# Patient Record
Sex: FEMALE | Race: AMERICAN INDIAN OR ALASKA NATIVE | NOT HISPANIC OR LATINO | Employment: FULL TIME | URBAN - METROPOLITAN AREA
[De-identification: names, ages, dates, MRNs, and addresses within clinical notes are randomized per-mention and may not be internally consistent; named-entity substitution may affect disease eponyms.]

---

## 2020-02-04 ENCOUNTER — TELEPHONE (OUTPATIENT)
Dept: VASCULAR SURGERY | Facility: CLINIC | Age: 50
End: 2020-02-04

## 2020-02-04 NOTE — TELEPHONE ENCOUNTER
----- Message from Beth Hurt sent at 2/4/2020 11:39 AM CST -----  Contact: pt   Pt would like to see about speaking with someone in staff about being scheduled to see doctor.  Pt contact is [phone]

## 2020-02-07 ENCOUNTER — INITIAL CONSULT (OUTPATIENT)
Dept: CARDIOLOGY | Facility: CLINIC | Age: 50
End: 2020-02-07
Payer: COMMERCIAL

## 2020-02-07 VITALS
HEIGHT: 66 IN | BODY MASS INDEX: 27.35 KG/M2 | SYSTOLIC BLOOD PRESSURE: 130 MMHG | DIASTOLIC BLOOD PRESSURE: 67 MMHG | WEIGHT: 170.19 LBS | HEART RATE: 85 BPM

## 2020-02-07 DIAGNOSIS — F17.200 SMOKING: ICD-10-CM

## 2020-02-07 DIAGNOSIS — I83.813 VARICOSE VEINS OF BOTH LOWER EXTREMITIES WITH PAIN: Primary | ICD-10-CM

## 2020-02-07 PROCEDURE — 3008F PR BODY MASS INDEX (BMI) DOCUMENTED: ICD-10-PCS | Mod: CPTII,S$GLB,, | Performed by: INTERNAL MEDICINE

## 2020-02-07 PROCEDURE — 99999 PR PBB SHADOW E&M-EST. PATIENT-LVL III: CPT | Mod: PBBFAC,,, | Performed by: INTERNAL MEDICINE

## 2020-02-07 PROCEDURE — 3008F BODY MASS INDEX DOCD: CPT | Mod: CPTII,S$GLB,, | Performed by: INTERNAL MEDICINE

## 2020-02-07 PROCEDURE — 99999 PR PBB SHADOW E&M-EST. PATIENT-LVL III: ICD-10-PCS | Mod: PBBFAC,,, | Performed by: INTERNAL MEDICINE

## 2020-02-07 PROCEDURE — 99205 OFFICE O/P NEW HI 60 MIN: CPT | Mod: S$GLB,,, | Performed by: INTERNAL MEDICINE

## 2020-02-07 PROCEDURE — 99205 PR OFFICE/OUTPT VISIT, NEW, LEVL V, 60-74 MIN: ICD-10-PCS | Mod: S$GLB,,, | Performed by: INTERNAL MEDICINE

## 2020-02-07 RX ORDER — MIRABEGRON 50 MG/1
TABLET, FILM COATED, EXTENDED RELEASE ORAL
COMMUNITY
Start: 2020-01-23 | End: 2022-01-19

## 2020-02-07 RX ORDER — DOXYCYCLINE HYCLATE 50 MG/1
CAPSULE ORAL
COMMUNITY
Start: 2020-01-23 | End: 2024-02-21

## 2020-02-07 RX ORDER — TRIAMCINOLONE ACETONIDE 5 MG/G
CREAM TOPICAL
COMMUNITY
Start: 2019-11-29 | End: 2024-02-08

## 2020-02-07 RX ORDER — CHOLECALCIFEROL (VITAMIN D3) 25 MCG
1000 TABLET ORAL DAILY
COMMUNITY
End: 2022-01-19

## 2020-02-07 NOTE — PROGRESS NOTES
"[facility]      Chief Complaint   Patient presents with    Varicose Veins       Patient ID: Isaias Collado is a 49 y.o. female with a past medical history of varicose veins s/p EVLT, lymphomatoid papulosis, who presents for an initial appointment.  Pertinent history events are as follows:     -Pt presents for evaluation of varicose veins.     HPI:  Mrs. Collado reports first noticing varicose veins approximately 5 years ago.  Underwent EVLT to the right leg in 2016.  Now complains of pain in right leg at areas of the varicose veins.  She has no edema and no tissue loss.  States she has "smoked socially for 5 years total."  Exam shows large varicose veins at RLE.  LLE with small varicose veins.    History reviewed. No pertinent past medical history.  History reviewed. No pertinent surgical history.  Social History     Socioeconomic History    Marital status:      Spouse name: Not on file    Number of children: Not on file    Years of education: Not on file    Highest education level: Not on file   Occupational History    Not on file   Social Needs    Financial resource strain: Not on file    Food insecurity:     Worry: Not on file     Inability: Not on file    Transportation needs:     Medical: Not on file     Non-medical: Not on file   Tobacco Use    Smoking status: Current Some Day Smoker    Smokeless tobacco: Never Used   Substance and Sexual Activity    Alcohol use: Yes     Comment: socially     Drug use: Not on file    Sexual activity: Not on file   Lifestyle    Physical activity:     Days per week: Not on file     Minutes per session: Not on file    Stress: Not on file   Relationships    Social connections:     Talks on phone: Not on file     Gets together: Not on file     Attends Islam service: Not on file     Active member of club or organization: Not on file     Attends meetings of clubs or organizations: Not on file     Relationship status: Not on file   Other Topics " "Concern    Not on file   Social History Narrative    Not on file     Family History   Problem Relation Age of Onset    Heart failure Father     Heart disease Father        Review of patient's allergies indicates:   Allergen Reactions    Morphine Itching       Medication List with Changes/Refills   Current Medications    DOXYCYCLINE (VIBRAMYCIN) 50 MG CAPSULE        LACTOBAC NO.41/BIFIDOBACT NO.7 (PROBIOTIC-10 ORAL)    Take by mouth.    MYRBETRIQ 50 MG TB24        TRIAMCINOLONE ACETONIDE 0.5% (KENALOG) 0.5 % CREA        VITAMIN D (VITAMIN D3) 1000 UNITS TAB    Take 1,000 Units by mouth once daily.       Review of Systems  Constitution: Denies chills, fever, and sweats.  HENT: Denies headaches or blurry vision.  Cardiovascular: Denies chest pain or irregular heart beat.  Respiratory: Denies cough or shortness of breath.  Gastrointestinal: Denies abdominal pain, nausea, or vomiting.  Musculoskeletal: Positive for right leg pain.    Neurological: Denies dizziness or focal weakness.  Psychiatric/Behavioral: Normal mental status.  Hematologic/Lymphatic: Denies bleeding problem or easy bruising/bleeding.  Skin: Denies rash or suspicious lesions    Physical Examination  /67 (BP Location: Left arm, Patient Position: Sitting, BP Method: Medium (Automatic))   Pulse 85   Ht 5' 6" (1.676 m)   Wt 77.2 kg (170 lb 3.1 oz)   BMI 27.47 kg/m²     Constitutional: No acute distress, conversant  HEENT: Sclera anicteric, Pupils equal, round and reactive to light, extraocular motions intact, Oropharynx clear  Neck: No JVD, no carotid bruits  Cardiovascular: regular rate and rhythm, no murmur, rubs or gallops, normal S1/S2  Pulmonary: Clear to auscultation bilaterally  Abdominal: Abdomen soft, nontender, nondistended, positive bowel sounds  Extremities: No lower extremity edema, large varicose veins at RLE.  LLE with small varicose veins.    Pulses:  Carotid pulses are 2+ on the right side, and 2+ on the left side.  Radial " pulses are 2+ on the right side, and 2+ on the left side.   Femoral pulses are 2+ on the right side, and 2+ on the left side.  Popliteal pulses are 2+ on the right side, and 2+ on the left side.   Dorsalis pedis pulses are 2+ on the right side, and 2+ on the left side.   Posterior tibial pulses are 2+ on the right side, and 2+ on the left side.    Skin: No ecchymosis, erythema, or ulcers  Psych: Alert and oriented x 3, appropriate affect  Neuro: CNII-XII intact, no focal deficits    Labs:  Most Recent Data  CBC: No results found for: WBC, HGB, HCT, PLT, MCV, RDW  BMP: No results found for: NA, K, CL, CO2, BUN, CREATININE, GLU, CALCIUM, MG, PHOS  LFTS; No results found for: PROT, ALBUMIN, BILITOT, AST, ALKPHOS, ALT, GGT  COAGS: No results found for: INR, PROTIME, PTT  FLP: No results found for: CHOL, HDL, LDLCALC, TRIG, CHOLHDL  CARDIAC: No results found for: TROPONINI, CKMB, BNP      Assessment/Plan:  Isaias Collado is a 49 y.o. female with a past medical history of varicose veins s/p EVLT, lymphomatoid papulosis, who presents for an initial appointment.     1. BLE Varicose Veins with Pain- Pt with history of previous EVLT of right leg.  Check BLE venous reflux study and STORM study.  If no moderate to severe PAD, will start graduated compression hose.  Pt to elevate legs when resting.     2. Smoking- Continue to encourage smoking cessation.  Pt does not want to be referred to smoking cessation at this time.      Follow up in 3 weeks    Total duration of face to face visit time 30 minutes.  Total time spent counseling greater than fifty percent of total visit time.  Counseling included discussion regarding imaging findings, diagnosis, possibilities, treatment options, risks and benefits.  The patient had many questions regarding the options and long-term effects.    Jose Coates MD, PhD  Interventional Cardiology

## 2020-02-07 NOTE — PATIENT INSTRUCTIONS
Assessment/Plan:  Isaias Collado is a 49 y.o. female with a past medical history of varicose veins s/p EVLT, lymphomatoid papulosis, who presents for an initial appointment.     1. BLE Varicose Veins with Pain- Pt with history of previous EVLT of right leg.  Check BLE venous reflux study and STORM study.  If no moderate to severe PAD, will start graduated compression hose.  Pt to elevate legs when resting.     2. Smoking- Continue to encourage smoking cessation.  Pt does not want to be referred to smoking cessation at this time.      Follow up in 3 weeks

## 2020-03-02 ENCOUNTER — CLINICAL SUPPORT (OUTPATIENT)
Dept: CARDIOLOGY | Facility: CLINIC | Age: 50
End: 2020-03-02
Attending: INTERNAL MEDICINE
Payer: COMMERCIAL

## 2020-03-02 DIAGNOSIS — I83.813 VARICOSE VEINS OF BOTH LOWER EXTREMITIES WITH PAIN: ICD-10-CM

## 2020-03-02 DIAGNOSIS — F17.200 SMOKING: ICD-10-CM

## 2020-03-02 LAB
LEFT ABI: 1.17
LEFT ARM BP: 146 MMHG
LEFT DORSALIS PEDIS: 171 MMHG
LEFT POSTERIOR TIBIAL: 165 MMHG
RIGHT ABI: 1.14
RIGHT ARM BP: 145 MMHG
RIGHT DORSALIS PEDIS: 158 MMHG
RIGHT POSTERIOR TIBIAL: 167 MMHG

## 2020-03-02 PROCEDURE — 93922 CV US ANKLE BRACHIAL INDICES EXT LTD WO STRESS (CUPID ONLY): ICD-10-PCS | Mod: S$GLB,,, | Performed by: INTERNAL MEDICINE

## 2020-03-02 PROCEDURE — 93922 UPR/L XTREMITY ART 2 LEVELS: CPT | Mod: S$GLB,,, | Performed by: INTERNAL MEDICINE

## 2020-03-04 ENCOUNTER — CLINICAL SUPPORT (OUTPATIENT)
Dept: CARDIOLOGY | Facility: CLINIC | Age: 50
End: 2020-03-04
Attending: INTERNAL MEDICINE
Payer: COMMERCIAL

## 2020-03-04 DIAGNOSIS — I83.813 VARICOSE VEINS OF BOTH LOWER EXTREMITIES WITH PAIN: ICD-10-CM

## 2020-03-04 DIAGNOSIS — F17.200 SMOKING: ICD-10-CM

## 2020-03-04 LAB
LEFT GIAC DIA: 0.25 MM
LEFT GREAT SAPHENOUS DISTAL THIGH DIA: 0.24 CM
LEFT GREAT SAPHENOUS JUNCTION DIA: 0.48 CM
LEFT GREAT SAPHENOUS JUNCTION REFLUX: 1832 MS
LEFT GREAT SAPHENOUS KNEE DIA: 0.26 CM
LEFT GREAT SAPHENOUS MIDDLE THIGH DIA: 0.32 CM
LEFT GREAT SAPHENOUS PROXIMAL CALF DIA: 0.23 CM
RIGHT GIAC DIA: 0.18 MM
RIGHT GREAT SAPHENOUS JUNCTION DIA: 2272 CM
RIGHT GREAT SAPHENOUS JUNCTION REFLUX: 1 MS
RIGHT GREAT SAPHENOUS KNEE DIA: 0.18 CM
RIGHT GREAT SAPHENOUS PROXIMAL CALF DIA: 0.2 CM

## 2020-03-04 PROCEDURE — 93970 EXTREMITY STUDY: CPT | Mod: S$GLB,,, | Performed by: INTERNAL MEDICINE

## 2020-03-04 PROCEDURE — 93970 CV US LOWER VENOUS INSUFFICIENCY BILATERAL (CUPID ONLY): ICD-10-PCS | Mod: S$GLB,,, | Performed by: INTERNAL MEDICINE

## 2020-03-12 ENCOUNTER — OFFICE VISIT (OUTPATIENT)
Dept: CARDIOLOGY | Facility: CLINIC | Age: 50
End: 2020-03-12
Payer: COMMERCIAL

## 2020-03-12 VITALS
HEART RATE: 80 BPM | DIASTOLIC BLOOD PRESSURE: 86 MMHG | HEIGHT: 66 IN | SYSTOLIC BLOOD PRESSURE: 128 MMHG | WEIGHT: 169.75 LBS | BODY MASS INDEX: 27.28 KG/M2

## 2020-03-12 DIAGNOSIS — F17.200 SMOKING: ICD-10-CM

## 2020-03-12 DIAGNOSIS — I87.2 VENOUS INSUFFICIENCY OF BOTH LOWER EXTREMITIES: ICD-10-CM

## 2020-03-12 DIAGNOSIS — I83.813 VARICOSE VEINS OF BOTH LOWER EXTREMITIES WITH PAIN: Primary | ICD-10-CM

## 2020-03-12 PROCEDURE — 99999 PR PBB SHADOW E&M-EST. PATIENT-LVL IV: ICD-10-PCS | Mod: PBBFAC,,, | Performed by: INTERNAL MEDICINE

## 2020-03-12 PROCEDURE — 99215 OFFICE O/P EST HI 40 MIN: CPT | Mod: S$GLB,,, | Performed by: INTERNAL MEDICINE

## 2020-03-12 PROCEDURE — 3008F BODY MASS INDEX DOCD: CPT | Mod: CPTII,S$GLB,, | Performed by: INTERNAL MEDICINE

## 2020-03-12 PROCEDURE — 3079F PR MOST RECENT DIASTOLIC BLOOD PRESSURE 80-89 MM HG: ICD-10-PCS | Mod: CPTII,S$GLB,, | Performed by: INTERNAL MEDICINE

## 2020-03-12 PROCEDURE — 3079F DIAST BP 80-89 MM HG: CPT | Mod: CPTII,S$GLB,, | Performed by: INTERNAL MEDICINE

## 2020-03-12 PROCEDURE — 3074F SYST BP LT 130 MM HG: CPT | Mod: CPTII,S$GLB,, | Performed by: INTERNAL MEDICINE

## 2020-03-12 PROCEDURE — 99999 PR PBB SHADOW E&M-EST. PATIENT-LVL IV: CPT | Mod: PBBFAC,,, | Performed by: INTERNAL MEDICINE

## 2020-03-12 PROCEDURE — 99215 PR OFFICE/OUTPT VISIT, EST, LEVL V, 40-54 MIN: ICD-10-PCS | Mod: S$GLB,,, | Performed by: INTERNAL MEDICINE

## 2020-03-12 PROCEDURE — 3074F PR MOST RECENT SYSTOLIC BLOOD PRESSURE < 130 MM HG: ICD-10-PCS | Mod: CPTII,S$GLB,, | Performed by: INTERNAL MEDICINE

## 2020-03-12 PROCEDURE — 3008F PR BODY MASS INDEX (BMI) DOCUMENTED: ICD-10-PCS | Mod: CPTII,S$GLB,, | Performed by: INTERNAL MEDICINE

## 2020-03-12 RX ORDER — PAROXETINE 10 MG/1
TABLET, FILM COATED ORAL
COMMUNITY
Start: 2020-02-07 | End: 2022-01-19

## 2020-03-12 RX ORDER — DOXYCYCLINE HYCLATE 100 MG
100 TABLET ORAL DAILY
COMMUNITY
End: 2022-01-19

## 2020-03-12 NOTE — PROGRESS NOTES
"[facility]      Chief Complaint   Patient presents with    Varicose veins of both lower extremities with pain       Patient ID: Isaias Collado is a 49 y.o. female with a past medical history of varicose veins s/p EVLT, lymphomatoid papulosis, who presents for a follow up appointment.  Pertinent history events are as follows:     -Pt presents for evaluation of varicose veins.     -At our initial clinic visit on 2020, Mrs. Collado reported first noticing varicose veins approximately 5 years ago.  Underwent EVLT to the right leg in 2016.  Now complains of pain in right leg at areas of the varicose veins.  She has no edema and no tissue loss.  States she has "smoked socially for 2 years total."  Exam shows large varicose veins at RLE.  LLE with small varicose veins.  Plan:   BLE Varicose Veins with Pain- Pt with history of previous EVLT of right leg.  Check BLE venous reflux study and STORM study.  If no moderate to severe PAD, will start graduated compression hose.  Pt to elevate legs when resting.   Smoking- Continue to encourage smoking cessation.  Pt does not want to be referred to smoking cessation at this time.      HPI:  Mrs. Collado reports continued pain in right leg at areas of the varicose veins.  She has no edema and no tissue loss.  BLE venous reflux study on 3/4/2020 showed significant BLE venous reflux with no DVT.  STORM Study on 3/2/2020 showed normal STORM bilaterally.  Normal PVR waveforms bilaterally.    Past Medical History:   Diagnosis Date    Adenomyosis internal 2016    Dysfunctional uterine bleeding 2016    Gestational diabetes     Hx of renal calculi     Hypertension     no problem since weight loss     Panic attacks     PCOS (polycystic ovarian syndrome) 2016    w/ insulin resistance    Rosacea     Varicose vein of leg     laser and injection tx      Past Surgical History:   Procedure Laterality Date     SECTION      ;     SKIN BIOPSY      " lymphomatoid papulosis    TUBAL LIGATION Bilateral     VARICOSE VEIN SURGERY Right     laser and injection     Social History     Socioeconomic History    Marital status:      Spouse name: Not on file    Number of children: Not on file    Years of education: Not on file    Highest education level: Not on file   Occupational History    Not on file   Social Needs    Financial resource strain: Not on file    Food insecurity:     Worry: Not on file     Inability: Not on file    Transportation needs:     Medical: Not on file     Non-medical: Not on file   Tobacco Use    Smoking status: Current Some Day Smoker    Smokeless tobacco: Never Used   Substance and Sexual Activity    Alcohol use: Yes     Comment: socially     Drug use: No    Sexual activity: Not on file   Lifestyle    Physical activity:     Days per week: Not on file     Minutes per session: Not on file    Stress: Not on file   Relationships    Social connections:     Talks on phone: Not on file     Gets together: Not on file     Attends Sikh service: Not on file     Active member of club or organization: Not on file     Attends meetings of clubs or organizations: Not on file     Relationship status: Not on file   Other Topics Concern    Not on file   Social History Narrative    ** Merged History Encounter **          Family History   Problem Relation Age of Onset    Heart failure Father     Heart disease Father     Diabetes Mother     Coronary artery disease Father     Diabetes Father        Review of patient's allergies indicates:   Allergen Reactions    Lisinopril Other (See Comments)     Cough.    Erythromycin Other (See Comments)     Nausea and cold sweats    Morphine Itching    Adhesive Rash     Paper tape ok       Medication List with Changes/Refills   Current Medications    ALPRAZOLAM (XANAX) 0.25 MG TABLET    TAKE 1 TABLET BY MOUTH 3 TIMES DAILY AS NEEDED FOR ANXIETY.    DOXYCYCLINE (ORACEA) 40 MG CAPSULE    Take  "40 mg by mouth every evening.    DOXYCYCLINE (VIBRA-TABS) 100 MG TABLET    Take 100 mg by mouth once daily.    DOXYCYCLINE (VIBRAMYCIN) 50 MG CAPSULE        ESCITALOPRAM OXALATE (LEXAPRO) 10 MG TABLET    Take 1 tablet (10 mg total) by mouth once daily.    IBUPROFEN (ADVIL,MOTRIN) 800 MG TABLET    Take 1 tablet (800 mg total) by mouth 3 (three) times daily.    LACTOBAC NO.41/BIFIDOBACT NO.7 (PROBIOTIC-10 ORAL)    Take by mouth.    METFORMIN (GLUCOPHAGE) 500 MG TABLET    Take 500 mg by mouth 2 (two) times daily with meals.    MYRBETRIQ 50 MG TB24        OXYCODONE-ACETAMINOPHEN (PERCOCET) 5-325 MG PER TABLET    Take 2 tablets by mouth every 6 (six) hours as needed for Pain.    PAROXETINE (PAXIL) 10 MG TABLET        PROMETHAZINE (PHENERGAN) 25 MG TABLET    Take 1 tablet (25 mg total) by mouth every 6 (six) hours as needed for Nausea.    TRIAMCINOLONE ACETONIDE 0.5% (KENALOG) 0.5 % CREA        VITAMIN D (VITAMIN D3) 1000 UNITS TAB    Take 1,000 Units by mouth once daily.       Review of Systems  Constitution: Denies chills, fever, and sweats.  HENT: Denies headaches or blurry vision.  Cardiovascular: Denies chest pain or irregular heart beat.  Respiratory: Denies cough or shortness of breath.  Gastrointestinal: Denies abdominal pain, nausea, or vomiting.  Musculoskeletal: Positive for right leg pain.    Neurological: Denies dizziness or focal weakness.  Psychiatric/Behavioral: Normal mental status.  Hematologic/Lymphatic: Denies bleeding problem or easy bruising/bleeding.  Skin: Denies rash or suspicious lesions    Physical Examination  /86   Pulse 80   Ht 5' 6" (1.676 m)   Wt 77 kg (169 lb 12.1 oz)   BMI 27.40 kg/m²     Constitutional: No acute distress, conversant  HEENT: Sclera anicteric, Pupils equal, round and reactive to light, extraocular motions intact, Oropharynx clear  Neck: No JVD, no carotid bruits  Cardiovascular: regular rate and rhythm, no murmur, rubs or gallops, normal S1/S2  Pulmonary: Clear " to auscultation bilaterally  Abdominal: Abdomen soft, nontender, nondistended, positive bowel sounds  Extremities: No lower extremity edema, large varicose veins at RLE.  LLE with small varicose veins.    Pulses:  Carotid pulses are 2+ on the right side, and 2+ on the left side.  Radial pulses are 2+ on the right side, and 2+ on the left side.   Femoral pulses are 2+ on the right side, and 2+ on the left side.  Popliteal pulses are 2+ on the right side, and 2+ on the left side.   Dorsalis pedis pulses are 2+ on the right side, and 2+ on the left side.   Posterior tibial pulses are 2+ on the right side, and 2+ on the left side.    Skin: No ecchymosis, erythema, or ulcers  Psych: Alert and oriented x 3, appropriate affect  Neuro: CNII-XII intact, no focal deficits    Labs:  Most Recent Data  CBC:   Lab Results   Component Value Date    WBC 8.34 09/07/2016    HGB 8.6 (L) 09/07/2016    HCT 28.4 (L) 09/07/2016     09/07/2016    MCV 82 09/07/2016    RDW 15.6 (H) 09/07/2016     BMP:   Lab Results   Component Value Date     09/07/2016    K 4.0 09/07/2016     09/07/2016    CO2 27 09/07/2016    BUN 7 09/07/2016    CREATININE 0.56 09/07/2016    GLU 90 09/07/2016    CALCIUM 7.6 (L) 09/07/2016     LFTS;   Lab Results   Component Value Date    PROT 7.0 03/23/2016    ALBUMIN 4.4 03/23/2016    BILITOT 0.4 03/23/2016    AST 16 03/23/2016    ALKPHOS 57 03/23/2016    ALT 15 03/23/2016     COAGS: No results found for: INR, PROTIME, PTT  FLP:   Lab Results   Component Value Date    CHOL 253 (H) 03/23/2016    HDL 56 03/23/2016    LDLCALC 155 (H) 03/23/2016    TRIG 212 (H) 03/23/2016     CARDIAC: No results found for: TROPONINI, CKMB, BNP    BLE venous reflux study 3/4/2020:  · No DVT. Bilateral Common Femoral Vein reflux.  · S/P Right EVLT. Right GSV not seen at mid/distal thigh to knee level. At the prox/mid thigh level it joins a tortuous superficial accessory vein that courses anteriorly down the mid and lower thigh  to below the knee. It measures up to 6mm in diameter and demonstrates reflux = 1906ms.  · Left GSV demonstrates reflux at the SFJ but no reflux demonstrated into the proximal thigh or lower leg.    STORM Study 3/2/2020:  Normal STORM bilaterally.  Normal PVR waveforms bilaterally.    Assessment/Plan:  Isaias Collado is a 49 y.o. female with a past medical history of varicose veins s/p EVLT, lymphomatoid papulosis, who presents for a follow up appointment.     1. BLE Varicose Veins with Pain- Pt with history of previous EVLT of right leg.  Mrs. Collado reports continued pain in right leg at areas of the varicose veins.  She has no edema and no tissue loss.  BLE venous reflux study on 3/4/2020 showed significant BLE venous reflux with no DVT.  STORM Study on 3/2/2020 showed normal STORM bilaterally.  Normal PVR waveforms bilaterally.  Start graduated compression hose.  Pt to elevate legs when resting.     2. Smoking- Continue to encourage smoking cessation.  Pt does not want to be referred to smoking cessation at this time.      Follow up in 3 months    Total duration of face to face visit time 30 minutes.  Total time spent counseling greater than fifty percent of total visit time.  Counseling included discussion regarding imaging findings, diagnosis, possibilities, treatment options, risks and benefits.  The patient had many questions regarding the options and long-term effects.    Jose Coates MD, PhD  Interventional Cardiology

## 2020-03-12 NOTE — PATIENT INSTRUCTIONS
Assessment/Plan:  Isaias Collado is a 49 y.o. female with a past medical history of varicose veins s/p EVLT, lymphomatoid papulosis, who presents for a follow up appointment.     1. BLE Varicose Veins with Pain- Pt with history of previous EVLT of right leg.  Mrs. Collado reports continued pain in right leg at areas of the varicose veins.  She has no edema and no tissue loss.  BLE venous reflux study on 3/4/2020 showed significant BLE venous reflux with no DVT.  STORM Study on 3/2/2020 showed normal STORM bilaterally.  Normal PVR waveforms bilaterally.  Start graduated compression hose.  Pt to elevate legs when resting.     2. Smoking- Continue to encourage smoking cessation.  Pt does not want to be referred to smoking cessation at this time.      Follow up in 3 months

## 2021-05-10 ENCOUNTER — PATIENT MESSAGE (OUTPATIENT)
Dept: RESEARCH | Facility: HOSPITAL | Age: 51
End: 2021-05-10

## 2022-01-19 ENCOUNTER — OFFICE VISIT (OUTPATIENT)
Dept: CARDIOLOGY | Facility: CLINIC | Age: 52
End: 2022-01-19
Payer: COMMERCIAL

## 2022-01-19 VITALS
DIASTOLIC BLOOD PRESSURE: 74 MMHG | HEIGHT: 66 IN | BODY MASS INDEX: 28.03 KG/M2 | HEART RATE: 78 BPM | WEIGHT: 174.38 LBS | SYSTOLIC BLOOD PRESSURE: 138 MMHG | OXYGEN SATURATION: 100 %

## 2022-01-19 DIAGNOSIS — I83.813 VARICOSE VEINS OF BOTH LOWER EXTREMITIES WITH PAIN: ICD-10-CM

## 2022-01-19 DIAGNOSIS — I83.11 VARICOSE VEINS OF BOTH LOWER EXTREMITIES WITH INFLAMMATION: ICD-10-CM

## 2022-01-19 DIAGNOSIS — I87.2 VENOUS INSUFFICIENCY OF BOTH LOWER EXTREMITIES: Primary | ICD-10-CM

## 2022-01-19 DIAGNOSIS — I83.12 VARICOSE VEINS OF BOTH LOWER EXTREMITIES WITH INFLAMMATION: ICD-10-CM

## 2022-01-19 DIAGNOSIS — F17.200 SMOKING: ICD-10-CM

## 2022-01-19 DIAGNOSIS — F17.200 SMOKER: ICD-10-CM

## 2022-01-19 PROCEDURE — 99215 OFFICE O/P EST HI 40 MIN: CPT | Mod: S$GLB,,, | Performed by: INTERNAL MEDICINE

## 2022-01-19 PROCEDURE — 99999 PR PBB SHADOW E&M-EST. PATIENT-LVL V: CPT | Mod: PBBFAC,,, | Performed by: INTERNAL MEDICINE

## 2022-01-19 PROCEDURE — 1159F PR MEDICATION LIST DOCUMENTED IN MEDICAL RECORD: ICD-10-PCS | Mod: CPTII,S$GLB,, | Performed by: INTERNAL MEDICINE

## 2022-01-19 PROCEDURE — 1159F MED LIST DOCD IN RCRD: CPT | Mod: CPTII,S$GLB,, | Performed by: INTERNAL MEDICINE

## 2022-01-19 PROCEDURE — 99999 PR PBB SHADOW E&M-EST. PATIENT-LVL V: ICD-10-PCS | Mod: PBBFAC,,, | Performed by: INTERNAL MEDICINE

## 2022-01-19 PROCEDURE — 3008F BODY MASS INDEX DOCD: CPT | Mod: CPTII,S$GLB,, | Performed by: INTERNAL MEDICINE

## 2022-01-19 PROCEDURE — 3078F DIAST BP <80 MM HG: CPT | Mod: CPTII,S$GLB,, | Performed by: INTERNAL MEDICINE

## 2022-01-19 PROCEDURE — 3008F PR BODY MASS INDEX (BMI) DOCUMENTED: ICD-10-PCS | Mod: CPTII,S$GLB,, | Performed by: INTERNAL MEDICINE

## 2022-01-19 PROCEDURE — 3078F PR MOST RECENT DIASTOLIC BLOOD PRESSURE < 80 MM HG: ICD-10-PCS | Mod: CPTII,S$GLB,, | Performed by: INTERNAL MEDICINE

## 2022-01-19 PROCEDURE — 3075F SYST BP GE 130 - 139MM HG: CPT | Mod: CPTII,S$GLB,, | Performed by: INTERNAL MEDICINE

## 2022-01-19 PROCEDURE — 3075F PR MOST RECENT SYSTOLIC BLOOD PRESS GE 130-139MM HG: ICD-10-PCS | Mod: CPTII,S$GLB,, | Performed by: INTERNAL MEDICINE

## 2022-01-19 PROCEDURE — 99215 PR OFFICE/OUTPT VISIT, EST, LEVL V, 40-54 MIN: ICD-10-PCS | Mod: S$GLB,,, | Performed by: INTERNAL MEDICINE

## 2022-01-19 RX ORDER — ESTRADIOL 0.5 MG/1
0.5 TABLET ORAL DAILY
COMMUNITY
End: 2023-11-06

## 2022-01-19 RX ORDER — HYDROCHLOROTHIAZIDE 25 MG/1
25 TABLET ORAL DAILY
COMMUNITY
Start: 2021-11-09

## 2022-01-19 RX ORDER — OXYBUTYNIN CHLORIDE 5 MG/1
5 TABLET ORAL 3 TIMES DAILY
COMMUNITY
Start: 2021-12-14 | End: 2024-02-08

## 2022-01-19 RX ORDER — CLOBETASOL PROPIONATE 0.5 MG/G
1 CREAM TOPICAL 2 TIMES DAILY
COMMUNITY
Start: 2021-12-16

## 2022-01-19 NOTE — PROGRESS NOTES
"[facility]      Chief Complaint   Patient presents with    Varicose veins of both lower extremities with pain    Thrombophlebitis     In right leg       Patient ID: Isaias Collado is a 51 y.o. female with HTN, varicose veins s/p EVLT, lymphomatoid papulosis, who presents for a follow up appointment.  Pertinent history events are as follows:     -Pt presents for evaluation of varicose veins.     -At our initial clinic visit on 2/7/2020, Mrs. Collado reported first noticing varicose veins approximately 5 years ago.  Underwent EVLT to the right leg in 2016.  Now complains of pain in right leg at areas of the varicose veins.  She has no edema and no tissue loss.  States she has "smoked socially for 2 years total."  Exam shows large varicose veins at RLE.  LLE with small varicose veins.  Plan:   BLE Varicose Veins with Pain- Pt with history of previous EVLT of right leg.  Check BLE venous reflux study and STORM study.  If no moderate to severe PAD, will start graduated compression hose.  Pt to elevate legs when resting.   Smoking- Continue to encourage smoking cessation.  Pt does not want to be referred to smoking cessation at this time.      -At follow up clinic visit on 3/12/2020, Mrs. Collado reported continued pain in right leg at areas of the varicose veins.  She has no edema and no tissue loss.  BLE venous reflux study on 3/4/2020 showed significant BLE venous reflux with no DVT.  STORM Study on 3/2/2020 showed normal STORM bilaterally.  Normal PVR waveforms bilaterally.  Plan:   BLE Varicose Veins with Pain- Pt with history of previous EVLT of right leg.  Mrs. Collado reports continued pain in right leg at areas of the varicose veins.  She has no edema and no tissue loss.  BLE venous reflux study on 3/4/2020 showed significant BLE venous reflux with no DVT.  STORM Study on 3/2/2020 showed normal STORM bilaterally.  Normal PVR waveforms bilaterally.  Start graduated compression hose.  Pt to elevate legs when resting. "   Smoking- Continue to encourage smoking cessation.  Pt does not want to be referred to smoking cessation at this time.    HPI:  Mrs. Collado reports right after kimi driving from [place]. she noticed sudden onset of pain, swelling, and erythema in her leg. She went to urgent care and was prescribed antibiotics. She did not have an US done. She has been wearing compression stockings occasionally.     Past Medical History:   Diagnosis Date    Adenomyosis internal 2016    Dysfunctional uterine bleeding 2016    Gestational diabetes     Hx of renal calculi     Hypertension     no problem since weight loss     Panic attacks     PCOS (polycystic ovarian syndrome) 2016    w/ insulin resistance    Rosacea     Varicose vein of leg     laser and injection tx      Past Surgical History:   Procedure Laterality Date     SECTION      ;     SKIN BIOPSY      lymphomatoid papulosis    TUBAL LIGATION Bilateral     VARICOSE VEIN SURGERY Right     laser and injection     Social History     Socioeconomic History    Marital status:    Tobacco Use    Smoking status: Current Some Day Smoker    Smokeless tobacco: Never Used   Substance and Sexual Activity    Alcohol use: Yes     Comment: socially     Drug use: No   Social History Narrative    ** Merged History Encounter **          Family History   Problem Relation Age of Onset    Heart failure Father     Heart disease Father     Diabetes Mother     Coronary artery disease Father     Diabetes Father        Review of patient's allergies indicates:   Allergen Reactions    Lisinopril Other (See Comments)     Cough.    Erythromycin Other (See Comments)     Nausea and cold sweats    Morphine Itching    Adhesive Rash     Paper tape ok       Medication List with Changes/Refills   Current Medications    CLOBETASOL (TEMOVATE) 0.05 % CREAM    Apply 1 application topically 2 (two) times daily.    DOXYCYCLINE (VIBRAMYCIN) 50 MG  CAPSULE        ESTRADIOL (ESTRACE) 0.5 MG TABLET    Take 0.5 mg by mouth once daily.    HYDROCHLOROTHIAZIDE (HYDRODIURIL) 25 MG TABLET    Take 25 mg by mouth once daily.    IBUPROFEN (ADVIL,MOTRIN) 800 MG TABLET    Take 1 tablet (800 mg total) by mouth 3 (three) times daily.    LACTOBAC NO.41/BIFIDOBACT NO.7 (PROBIOTIC-10 ORAL)    Take by mouth.    OXYBUTYNIN (DITROPAN) 5 MG TAB    Take 5 mg by mouth 3 (three) times daily.    TRIAMCINOLONE ACETONIDE 0.5% (KENALOG) 0.5 % CREA       Discontinued Medications    ALPRAZOLAM (XANAX) 0.25 MG TABLET    TAKE 1 TABLET BY MOUTH 3 TIMES DAILY AS NEEDED FOR ANXIETY.    DOXYCYCLINE (ORACEA) 40 MG CAPSULE    Take 40 mg by mouth every evening.    DOXYCYCLINE (VIBRA-TABS) 100 MG TABLET    Take 100 mg by mouth once daily.    ESCITALOPRAM OXALATE (LEXAPRO) 10 MG TABLET    Take 1 tablet (10 mg total) by mouth once daily.    METFORMIN (GLUCOPHAGE) 500 MG TABLET    Take 500 mg by mouth 2 (two) times daily with meals.    MYRBETRIQ 50 MG TB24        OXYCODONE-ACETAMINOPHEN (PERCOCET) 5-325 MG PER TABLET    Take 2 tablets by mouth every 6 (six) hours as needed for Pain.    PAROXETINE (PAXIL) 10 MG TABLET        PROMETHAZINE (PHENERGAN) 25 MG TABLET    Take 1 tablet (25 mg total) by mouth every 6 (six) hours as needed for Nausea.    VITAMIN D (VITAMIN D3) 1000 UNITS TAB    Take 1,000 Units by mouth once daily.       Review of Systems  Constitution: Denies chills, fever, and sweats.  HENT: Denies headaches or blurry vision.  Cardiovascular: Denies chest pain or irregular heart beat.  Respiratory: Denies cough or shortness of breath.  Gastrointestinal: Denies abdominal pain, nausea, or vomiting.  Musculoskeletal: Positive for right leg pain.    Neurological: Denies dizziness or focal weakness.  Psychiatric/Behavioral: Normal mental status.  Hematologic/Lymphatic: Denies bleeding problem or easy bruising/bleeding.  Skin: Denies rash or suspicious lesions    Physical Examination  /74    "Pulse 78   Ht 5' 6" (1.676 m)   Wt 79.1 kg (174 lb 6.1 oz)   SpO2 100%   BMI 28.15 kg/m²     Constitutional: No acute distress, conversant  HEENT: Sclera anicteric, Pupils equal, round and reactive to light, extraocular motions intact, Oropharynx clear  Neck: No JVD, no carotid bruits  Cardiovascular: regular rate and rhythm, no murmur, rubs or gallops, normal S1/S2  Pulmonary: Clear to auscultation bilaterally  Abdominal: Abdomen soft, nontender, nondistended, positive bowel sounds  Extremities: No lower extremity edema, large varicose veins at RLE with tenderness.  LLE with small varicose veins.    Pulses:  Carotid pulses are 2+ on the right side, and 2+ on the left side.  Radial pulses are 2+ on the right side, and 2+ on the left side.   Femoral pulses are 2+ on the right side, and 2+ on the left side.  Popliteal pulses are 2+ on the right side, and 2+ on the left side.   Dorsalis pedis pulses are 2+ on the right side, and 2+ on the left side.   Posterior tibial pulses are 2+ on the right side, and 2+ on the left side.    Skin: No ecchymosis, erythema, or ulcers  Psych: Alert and oriented x 3, appropriate affect  Neuro: CNII-XII intact, no focal deficits    Labs:  Most Recent Data  CBC:   Lab Results   Component Value Date    WBC 8.34 09/07/2016    HGB 8.6 (L) 09/07/2016    HCT 28.4 (L) 09/07/2016     09/07/2016    MCV 82 09/07/2016    RDW 15.6 (H) 09/07/2016     BMP:   Lab Results   Component Value Date     09/07/2016    K 4.0 09/07/2016     09/07/2016    CO2 27 09/07/2016    BUN 7 09/07/2016    CREATININE 0.56 09/07/2016    GLU 90 09/07/2016    CALCIUM 7.6 (L) 09/07/2016     LFTS;   Lab Results   Component Value Date    PROT 7.0 03/23/2016    ALBUMIN 4.4 03/23/2016    BILITOT 0.4 03/23/2016    AST 16 03/23/2016    ALKPHOS 57 03/23/2016    ALT 15 03/23/2016     COAGS: No results found for: INR, PROTIME, PTT  FLP:   Lab Results   Component Value Date    CHOL 253 (H) 03/23/2016    HDL 56 " 03/23/2016    LDLCALC 155 (H) 03/23/2016    TRIG 212 (H) 03/23/2016     CARDIAC: No results found for: TROPONINI, CKMB, BNP    BLE venous reflux study 3/4/2020:  · No DVT. Bilateral Common Femoral Vein reflux.  · S/P Right EVLT. Right GSV not seen at mid/distal thigh to knee level. At the prox/mid thigh level it joins a tortuous superficial accessory vein that courses anteriorly down the mid and lower thigh to below the knee. It measures up to 6mm in diameter and demonstrates reflux = 1906ms.  · Left GSV demonstrates reflux at the SFJ but no reflux demonstrated into the proximal thigh or lower leg.    STORM Study 3/2/2020:  Normal STORM bilaterally.  Normal PVR waveforms bilaterally.    Assessment/Plan:  Isaias Collado is a 49 y.o. female with HTN, varicose veins s/p EVLT, lymphomatoid papulosis, who presents for a follow up appointment.     1. BLE Varicose Veins with Pain- Pt with history of previous EVLT of right leg. BLE venous reflux study on 3/4/2020 showed significant BLE venous reflux with no DVT. Repeat venous US for evaluation of DVT and consideration of varicose vein interventions.    2. Smoker- encourage smoking cessation. Attributed to anxiety. Refer to psychiatry and smoking cessation clinic.    Follow up in 3 months with lipids prior    Total duration of face to face visit time 30 minutes.  Total time spent counseling greater than fifty percent of total visit time.  Counseling included discussion regarding imaging findings, diagnosis, possibilities, treatment options, risks and benefits.  The patient had many questions regarding the options and long-term effects.    Faith Antonio MD  Vascular Medicine Fellow    Patient seen and discussed with Dr. Coates

## 2022-01-19 NOTE — PATIENT INSTRUCTIONS
1. BLE Varicose Veins with Pain- Pt with history of previous EVLT of right leg. BLE venous reflux study on 3/4/2020 showed significant BLE venous reflux with no DVT. Repeat venous US for evaluation of DVT and consideration of varicose vein interventions.    2. Smoker- encourage smoking cessation. Attributed to anxiety. Refer to psychiatry and smoking cessation clinic.    Follow up in 3 months with lipids prior

## 2022-01-24 ENCOUNTER — HOSPITAL ENCOUNTER (OUTPATIENT)
Dept: CARDIOLOGY | Facility: HOSPITAL | Age: 52
Discharge: HOME | End: 2022-01-24
Attending: STUDENT IN AN ORGANIZED HEALTH CARE EDUCATION/TRAINING PROGRAM
Payer: COMMERCIAL

## 2022-01-24 DIAGNOSIS — I87.2 VENOUS INSUFFICIENCY OF BOTH LOWER EXTREMITIES: ICD-10-CM

## 2022-01-24 LAB
LEFT GIAC DIA: 0.29 MM
LEFT GREAT SAPHENOUS DISTAL THIGH DIA: 0.2 CM
LEFT GREAT SAPHENOUS JUNCTION DIA: 0.34 CM
LEFT GREAT SAPHENOUS KNEE DIA: 0.18 CM
LEFT GREAT SAPHENOUS MIDDLE THIGH DIA: 0.21 CM
LEFT GREAT SAPHENOUS PROXIMAL CALF DIA: 0.18 CM
RIGHT GREAT SAPHENOUS JUNCTION DIA: 0.54 CM
RIGHT SMALL SAPHENOUS SPJ DIA: 0.25 CM

## 2022-01-24 PROCEDURE — 93970 CV US LOWER VENOUS INSUFFICIENCY BILATERAL (CUPID ONLY): ICD-10-PCS | Mod: 26,,, | Performed by: INTERNAL MEDICINE

## 2022-01-24 PROCEDURE — 93970 EXTREMITY STUDY: CPT | Mod: 26,,, | Performed by: INTERNAL MEDICINE

## 2022-01-24 PROCEDURE — 93970 EXTREMITY STUDY: CPT | Mod: TC

## 2022-01-25 ENCOUNTER — OFFICE VISIT (OUTPATIENT)
Dept: CARDIOLOGY | Facility: CLINIC | Age: 52
End: 2022-01-25
Payer: COMMERCIAL

## 2022-01-25 VITALS
RESPIRATION RATE: 20 BRPM | SYSTOLIC BLOOD PRESSURE: 123 MMHG | WEIGHT: 172.5 LBS | DIASTOLIC BLOOD PRESSURE: 80 MMHG | HEART RATE: 79 BPM | BODY MASS INDEX: 27.72 KG/M2 | HEIGHT: 66 IN

## 2022-01-25 DIAGNOSIS — I83.12 VARICOSE VEINS OF BOTH LOWER EXTREMITIES WITH INFLAMMATION: ICD-10-CM

## 2022-01-25 DIAGNOSIS — I87.2 VENOUS INSUFFICIENCY OF BOTH LOWER EXTREMITIES: ICD-10-CM

## 2022-01-25 DIAGNOSIS — I83.11 VARICOSE VEINS OF BOTH LOWER EXTREMITIES WITH INFLAMMATION: ICD-10-CM

## 2022-01-25 PROCEDURE — 3079F DIAST BP 80-89 MM HG: CPT | Mod: CPTII,S$GLB,, | Performed by: INTERNAL MEDICINE

## 2022-01-25 PROCEDURE — 99214 PR OFFICE/OUTPT VISIT, EST, LEVL IV, 30-39 MIN: ICD-10-PCS | Mod: S$GLB,,, | Performed by: INTERNAL MEDICINE

## 2022-01-25 PROCEDURE — 1160F PR REVIEW ALL MEDS BY PRESCRIBER/CLIN PHARMACIST DOCUMENTED: ICD-10-PCS | Mod: CPTII,S$GLB,, | Performed by: INTERNAL MEDICINE

## 2022-01-25 PROCEDURE — 3074F PR MOST RECENT SYSTOLIC BLOOD PRESSURE < 130 MM HG: ICD-10-PCS | Mod: CPTII,S$GLB,, | Performed by: INTERNAL MEDICINE

## 2022-01-25 PROCEDURE — 99999 PR PBB SHADOW E&M-EST. PATIENT-LVL IV: CPT | Mod: PBBFAC,,, | Performed by: INTERNAL MEDICINE

## 2022-01-25 PROCEDURE — 3074F SYST BP LT 130 MM HG: CPT | Mod: CPTII,S$GLB,, | Performed by: INTERNAL MEDICINE

## 2022-01-25 PROCEDURE — 99214 OFFICE O/P EST MOD 30 MIN: CPT | Mod: S$GLB,,, | Performed by: INTERNAL MEDICINE

## 2022-01-25 PROCEDURE — 1160F RVW MEDS BY RX/DR IN RCRD: CPT | Mod: CPTII,S$GLB,, | Performed by: INTERNAL MEDICINE

## 2022-01-25 PROCEDURE — 1159F PR MEDICATION LIST DOCUMENTED IN MEDICAL RECORD: ICD-10-PCS | Mod: CPTII,S$GLB,, | Performed by: INTERNAL MEDICINE

## 2022-01-25 PROCEDURE — 99999 PR PBB SHADOW E&M-EST. PATIENT-LVL IV: ICD-10-PCS | Mod: PBBFAC,,, | Performed by: INTERNAL MEDICINE

## 2022-01-25 PROCEDURE — 1159F MED LIST DOCD IN RCRD: CPT | Mod: CPTII,S$GLB,, | Performed by: INTERNAL MEDICINE

## 2022-01-25 PROCEDURE — 3008F PR BODY MASS INDEX (BMI) DOCUMENTED: ICD-10-PCS | Mod: CPTII,S$GLB,, | Performed by: INTERNAL MEDICINE

## 2022-01-25 PROCEDURE — 3008F BODY MASS INDEX DOCD: CPT | Mod: CPTII,S$GLB,, | Performed by: INTERNAL MEDICINE

## 2022-01-25 PROCEDURE — 3079F PR MOST RECENT DIASTOLIC BLOOD PRESSURE 80-89 MM HG: ICD-10-PCS | Mod: CPTII,S$GLB,, | Performed by: INTERNAL MEDICINE

## 2022-01-25 NOTE — PROGRESS NOTES
Chief Complaint   Patient presents with    Varicose Veins       HPI:  This is a pleasant 51-year-old female with a history of venous insufficiency status post right lower extremity GSV ablation in 2013, varicose veins, and hypertension presenting for initial evaluation by me.    Patient notes a long history of right lower extremity varicose veins.  She 1st sought care for this in the early 2010s, which resulted in a right lower extremity GSV ablation.  This procedure did not significantly affect her symptoms.  Since then she notes intermittent pain in the area of her varicosities.  It can be worse when standing on her feet for long periods of time and can sometimes be painful to touch.  No issues with lower extremity edema or ulcers.  No left lower extremity symptoms noted.    Earlier this month she noted a sudden onset of pain and erythema along parts of this varicosity.  She went to an outside hospital ED where evaluation was unremarkable.  She used a compression bandage to help with symptoms, which have slowly improved.  Initially she noted significant erythema and described cords in her proximal right lower extremity varicosity.  Symptoms have slowly regressed, but she still notes some discomfort.    Venous ultrasound was performed and demonstrated no evidence of DVT bilaterally within ablated GSV and a tortuous anterior varicosity that was dilated and partially thrombosed.  The patient has no previous history of deep venous thrombosis or previous diagnosis of superficial venous thrombosis.      PHYSICAL EXAM:  Vitals:    01/25/22 1339   BP: 123/80   Pulse: 79   Resp: 20       Physical Exam  Constitutional:       Appearance: Normal appearance.   Neck:      Vascular: No carotid bruit or JVD.   Cardiovascular:      Rate and Rhythm: Normal rate and regular rhythm.      Pulses: Normal pulses.           Radial pulses are 2+ on the right side and 2+ on the left side.        Dorsalis pedis pulses are 2+ on the right  side and 2+ on the left side.        Posterior tibial pulses are 2+ on the right side and 2+ on the left side.      Heart sounds: S1 normal and S2 normal. No murmur heard.  No S3 sounds.       Comments: Large anterior, medial varicosities that are tortuous and palpable with cords from the mid to distal thigh.  No erythema or significant tenderness to palpation.  Multiple bilateral lower extremity spider veins and telangiectasias.  No edema.  Pulmonary:      Effort: Pulmonary effort is normal.      Breath sounds: Normal breath sounds. No rales.   Neurological:      Gait: Gait is intact.         LABS/CARDIAC TESTS:  STORM March 2020 normal STORM and PVR waveforms bilaterally.  Venous duplex March 2020 no evidence of DVT.  Right GSV status post EVLT.  Tortuous accessory vein noted that is 6 mm in diameter with large amounts reflux.  Left GSV reflux noted at the SFJ.  January 2022 no evidence of DVT bilaterally.  Right GSV is ablated.  Right superficial varicosities noted with thrombophlebitis.  No evidence of left GSV reflux.    ASSESSMENT & PLAN:    Venous insufficiency of both lower extremities  At this time the patient's main symptoms are related to a subacute right lower extremity superficial thrombophlebitis of a large anterior varicose vein that has been present for some time.  This vein has given her problems previously, with intermittent pain at times.  Her acute symptoms are resolving and she prefers not to take anticoagulation which is fine.  Physical exam with no signs of infection and likely with resolving inflammation.  Can continue with conservative management at this time including compression and anti-inflammatory therapy p.r.n..    Will re-evaluate the patient in a few months and can consider poliodoconol therapy if these varicosities remain open and painful.  On ultrasound yesterday they were partially thrombosed.          Venous insufficiency of both lower extremities  -     Ambulatory referral/consult  to Interventional Cardiology    Varicose veins of both lower extremities with inflammation  -     Ambulatory referral/consult to Interventional Cardiology        Tate Fink MD

## 2022-01-25 NOTE — ASSESSMENT & PLAN NOTE
At this time the patient's main symptoms are related to a subacute right lower extremity superficial thrombophlebitis of a large anterior varicose vein that has been present for some time.  This vein has given her problems previously, with intermittent pain at times.  Her acute symptoms are resolving and she prefers not to take anticoagulation which is fine.  Physical exam with no signs of infection and likely with resolving inflammation.  Can continue with conservative management at this time including compression and anti-inflammatory therapy p.r.n..    Will re-evaluate the patient in a few months and can consider poliodoconol therapy if these varicosities remain open and painful.  On ultrasound yesterday they were partially thrombosed.

## 2022-03-15 ENCOUNTER — CLINICAL SUPPORT (OUTPATIENT)
Dept: SMOKING CESSATION | Facility: CLINIC | Age: 52
End: 2022-03-15
Payer: COMMERCIAL

## 2022-03-15 DIAGNOSIS — F17.200 NICOTINE DEPENDENCE: Primary | ICD-10-CM

## 2022-03-15 PROCEDURE — 99404 PR PREVENT COUNSEL,INDIV,60 MIN: ICD-10-PCS | Mod: S$GLB,,,

## 2022-03-15 PROCEDURE — 99404 PREV MED CNSL INDIV APPRX 60: CPT | Mod: S$GLB,,,

## 2022-03-15 PROCEDURE — 99999 PR PBB SHADOW E&M-EST. PATIENT-LVL II: ICD-10-PCS | Mod: PBBFAC,,,

## 2022-03-15 PROCEDURE — 99999 PR PBB SHADOW E&M-EST. PATIENT-LVL II: CPT | Mod: PBBFAC,,,

## 2022-03-15 RX ORDER — BUPROPION HYDROCHLORIDE 150 MG/1
TABLET, EXTENDED RELEASE ORAL
Qty: 57 TABLET | Refills: 0 | Status: SHIPPED | OUTPATIENT
Start: 2022-03-15 | End: 2022-04-14 | Stop reason: SDUPTHER

## 2022-03-15 RX ORDER — IBUPROFEN 200 MG
1 TABLET ORAL DAILY
Qty: 14 PATCH | Refills: 0 | Status: SHIPPED | OUTPATIENT
Start: 2022-03-15 | End: 2022-03-29

## 2022-03-15 NOTE — PROGRESS NOTES
At SOC, patient reports smoking 6 cigarettes per day. Reports she only smokes outside. Discussed the role of tobacco cessation program, role of NRT & behavioral changes to assist the patient to reach her goal of being tobacco free. The patient states she wants to use Wellbutrin & the 14 mg patches.  Education & instruction on the role of the NRT, usage & proper placement of the patch & given a written handout on how to use as well as how to use & dose Wellbutrin. Care coordination with the patient to discuss behavioral strategies created during care collaboration which included: moving her cigarettes to another location, usage of listening to relaxing music & yoga & to report during the subsequent visit with CTTS to assist with care planning. The patient verbalized understanding & willingness to apply. Patient instructed to call CTTS anytime. Pt's exhaled carbon monoxide level was 8  ppm as per [employer]. (non- smoker = 0-5 ppm.). Set return appointment date for 3/28/2022 at 4:30 pm

## 2022-03-28 ENCOUNTER — CLINICAL SUPPORT (OUTPATIENT)
Dept: SMOKING CESSATION | Facility: CLINIC | Age: 52
End: 2022-03-28
Payer: COMMERCIAL

## 2022-03-28 DIAGNOSIS — F17.200 NICOTINE DEPENDENCE: Primary | ICD-10-CM

## 2022-03-28 PROCEDURE — 99404 PREV MED CNSL INDIV APPRX 60: CPT | Mod: S$GLB,,,

## 2022-03-28 PROCEDURE — 99999 PR PBB SHADOW E&M-EST. PATIENT-LVL I: CPT | Mod: PBBFAC,,,

## 2022-03-28 PROCEDURE — 99999 PR PBB SHADOW E&M-EST. PATIENT-LVL I: ICD-10-PCS | Mod: PBBFAC,,,

## 2022-03-28 PROCEDURE — 99404 PR PREVENT COUNSEL,INDIV,60 MIN: ICD-10-PCS | Mod: S$GLB,,,

## 2022-03-28 NOTE — PROGRESS NOTES
Individual Follow-Up Form    3/28/2022    Quit Date:     Clinical Status of Patient: Outpatient    Length of Service: 60 minutes    Continuing Medication: yes  Wellbutrin    Other Medications: ---     Target Symptoms: Withdrawal and medication side effects. The following were  rated moderate (3) to severe (4) on TCRS:  · Moderate (3): mild anxiousness, will monitor  · Severe (4): ---    Comments: Patient presents for follow up smoking 3-4 cigarettes per day . Pt remains on tobacco cessation medication of 150 mg Wellbutrin SR  BID , she has not starting using the patches yet but will this week.   No adverse effects/mental changes noted at this time. Pt doing well with rate reduction and wait times prior to smoking. She has notice some mild anxiousness but not interfering with her normal day, she is not sure if it is from the Wellbutrin , will monitor . She feels the medication is helping she has a distaste for cigarettes .  Reviewed coping strategies/habitual behavior/relapse prevention with patient.   Patient understands she can call CTTS at any time.      Diagnosis: F17.210    Next Visit: 2 weeks

## 2022-03-29 ENCOUNTER — PATIENT MESSAGE (OUTPATIENT)
Dept: PSYCHIATRY | Facility: CLINIC | Age: 52
End: 2022-03-29
Payer: COMMERCIAL

## 2022-03-29 ENCOUNTER — OFFICE VISIT (OUTPATIENT)
Dept: PSYCHIATRY | Facility: CLINIC | Age: 52
End: 2022-03-29
Payer: COMMERCIAL

## 2022-03-29 DIAGNOSIS — F41.1 GENERALIZED ANXIETY DISORDER WITH PANIC ATTACKS: ICD-10-CM

## 2022-03-29 DIAGNOSIS — F41.0 GENERALIZED ANXIETY DISORDER WITH PANIC ATTACKS: ICD-10-CM

## 2022-03-29 DIAGNOSIS — F40.00 AGORAPHOBIA: ICD-10-CM

## 2022-03-29 DIAGNOSIS — F40.243 FEAR OF FLYING: ICD-10-CM

## 2022-03-29 DIAGNOSIS — F17.200 SMOKER: ICD-10-CM

## 2022-03-29 PROCEDURE — 1159F MED LIST DOCD IN RCRD: CPT | Mod: CPTII,95,, | Performed by: SOCIAL WORKER

## 2022-03-29 PROCEDURE — 90791 PSYCH DIAGNOSTIC EVALUATION: CPT | Mod: 95,,, | Performed by: SOCIAL WORKER

## 2022-03-29 PROCEDURE — 90791 PR PSYCHIATRIC DIAGNOSTIC EVALUATION: ICD-10-PCS | Mod: 95,,, | Performed by: SOCIAL WORKER

## 2022-03-29 PROCEDURE — 1159F PR MEDICATION LIST DOCUMENTED IN MEDICAL RECORD: ICD-10-PCS | Mod: CPTII,95,, | Performed by: SOCIAL WORKER
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
## 2022-03-30 PROBLEM — F40.00 AGORAPHOBIA: Status: ACTIVE | Noted: 2022-03-30

## 2022-03-30 PROBLEM — F41.1 GENERALIZED ANXIETY DISORDER WITH PANIC ATTACKS: Status: ACTIVE | Noted: 2022-03-30

## 2022-03-30 PROBLEM — F40.243 FEAR OF FLYING: Status: ACTIVE | Noted: 2022-03-30

## 2022-03-30 PROBLEM — F41.0 GENERALIZED ANXIETY DISORDER WITH PANIC ATTACKS: Status: ACTIVE | Noted: 2022-03-30

## 2022-04-14 ENCOUNTER — CLINICAL SUPPORT (OUTPATIENT)
Dept: SMOKING CESSATION | Facility: CLINIC | Age: 52
End: 2022-04-14
Payer: COMMERCIAL

## 2022-04-14 DIAGNOSIS — F17.200 NICOTINE DEPENDENCE: Primary | ICD-10-CM

## 2022-04-14 PROCEDURE — 99999 PR PBB SHADOW E&M-EST. PATIENT-LVL II: ICD-10-PCS | Mod: PBBFAC,,,

## 2022-04-14 PROCEDURE — 99404 PR PREVENT COUNSEL,INDIV,60 MIN: ICD-10-PCS | Mod: S$GLB,,,

## 2022-04-14 PROCEDURE — 99999 PR PBB SHADOW E&M-EST. PATIENT-LVL II: CPT | Mod: PBBFAC,,,

## 2022-04-14 PROCEDURE — 99404 PREV MED CNSL INDIV APPRX 60: CPT | Mod: S$GLB,,,

## 2022-04-14 RX ORDER — BUPROPION HYDROCHLORIDE 150 MG/1
150 TABLET, EXTENDED RELEASE ORAL 2 TIMES DAILY
Qty: 60 TABLET | Refills: 0 | Status: SHIPPED | OUTPATIENT
Start: 2022-04-14 | End: 2022-05-12 | Stop reason: SDUPTHER

## 2022-04-14 RX ORDER — NICOTINE 7MG/24HR
1 PATCH, TRANSDERMAL 24 HOURS TRANSDERMAL DAILY
Qty: 28 PATCH | Refills: 0 | Status: SHIPPED | OUTPATIENT
Start: 2022-04-14 | End: 2022-05-12 | Stop reason: SDUPTHER

## 2022-04-14 NOTE — PROGRESS NOTES
Individual Follow-Up Form    4/14/2022    Quit Date: 4/12/2022    Clinical Status of Patient: Outpatient      Continuing Medication: yes  Wellbutrin or Patches    Other Medications: none     Target Symptoms: Withdrawal and medication side effects. The following were  rated moderate (3) to severe (4) on TCRS:  · Moderate (3): none  · Severe (4): none    Comments: Pt seen in office today. He/She is tobacco free since 4/12/22.  . Pt remains on tobacco cessation medication of 150 mg Wellbutrin SR  BID and 14 mg nicotine patch.  No adverse effects/mental changes noted at this time. She has been cutting the patches in half because she feels they are too strong, will send her 7 mg patches.  She also finds herself only taking 1 wellbutrin tablet in the morning and feels better at that dose, will monitor for now.  Reviewed coping strategies/habitual behavior/relapse prevention with patient. .  Reviewed learned addiction model, personal reasons for quitting, medications, goals, quit date. Commended patient for a job well done, she elected to receive her quit coin today !  Patient understands she can call CTTS at any time.            Diagnosis: F17.210    Next Visit: 1 week

## 2022-04-26 ENCOUNTER — CLINICAL SUPPORT (OUTPATIENT)
Dept: SMOKING CESSATION | Facility: CLINIC | Age: 52
End: 2022-04-26
Payer: COMMERCIAL

## 2022-04-26 DIAGNOSIS — F17.200 NICOTINE DEPENDENCE: Primary | ICD-10-CM

## 2022-04-26 PROCEDURE — 99404 PR PREVENT COUNSEL,INDIV,60 MIN: ICD-10-PCS | Mod: S$GLB,,,

## 2022-04-26 PROCEDURE — 99404 PREV MED CNSL INDIV APPRX 60: CPT | Mod: S$GLB,,,

## 2022-04-26 PROCEDURE — 99999 PR PBB SHADOW E&M-EST. PATIENT-LVL I: CPT | Mod: PBBFAC,,,

## 2022-04-26 PROCEDURE — 99999 PR PBB SHADOW E&M-EST. PATIENT-LVL I: ICD-10-PCS | Mod: PBBFAC,,,

## 2022-04-26 NOTE — PROGRESS NOTES
Individual Follow-Up Form    4/26/2022    Quit Date:     Clinical Status of Patient: Outpatient      Continuing Medication: yes  Wellbutrin or Patches       Target Symptoms: Withdrawal and medication side effects. The following were  rated moderate (3) to severe (4) on TCRS:  · Moderate (3): ---  · Severe (4): ----    Comments: doing well , urges are getting better  Telephonic visit .    Patient is tobacco free.  Pt remains on tobacco cessation medication of 7  mg nicotine patch QD and 150 mg Wellbutrin SR QD. No adverse effects noted at this time. Congratulated patient on his /her success Reviewed coping strategies/habitual behavior/relapse prevention with patient.  #2  Reviewed strategies, cues, and triggers. Introduced the negative impact of tobacco on health, the health advantages of discontinuing the use of tobacco, time line improved health changes after a quit, withdrawal issues to expect from nicotine and habit, and ways to achieve the goal of a quit.  Patient understands she can call CTTS at any time.            Diagnosis: F17.210    Next Visit: 2 weeks

## 2022-05-09 ENCOUNTER — OFFICE VISIT (OUTPATIENT)
Dept: PSYCHIATRY | Facility: CLINIC | Age: 52
End: 2022-05-09
Payer: COMMERCIAL

## 2022-05-09 DIAGNOSIS — F40.00 AGORAPHOBIA: Primary | ICD-10-CM

## 2022-05-09 DIAGNOSIS — F41.0 GENERALIZED ANXIETY DISORDER WITH PANIC ATTACKS: ICD-10-CM

## 2022-05-09 DIAGNOSIS — F41.1 GENERALIZED ANXIETY DISORDER WITH PANIC ATTACKS: ICD-10-CM

## 2022-05-09 DIAGNOSIS — F40.243 FEAR OF FLYING: ICD-10-CM

## 2022-05-09 PROCEDURE — 90837 PSYTX W PT 60 MINUTES: CPT | Mod: S$GLB,,, | Performed by: SOCIAL WORKER

## 2022-05-09 PROCEDURE — 1159F PR MEDICATION LIST DOCUMENTED IN MEDICAL RECORD: ICD-10-PCS | Mod: CPTII,S$GLB,, | Performed by: SOCIAL WORKER

## 2022-05-09 PROCEDURE — 90837 PR PSYCHOTHERAPY W/PATIENT, 60 MIN: ICD-10-PCS | Mod: S$GLB,,, | Performed by: SOCIAL WORKER

## 2022-05-09 PROCEDURE — 1159F MED LIST DOCD IN RCRD: CPT | Mod: CPTII,S$GLB,, | Performed by: SOCIAL WORKER
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
## 2022-05-12 ENCOUNTER — CLINICAL SUPPORT (OUTPATIENT)
Dept: SMOKING CESSATION | Facility: CLINIC | Age: 52
End: 2022-05-12
Payer: COMMERCIAL

## 2022-05-12 DIAGNOSIS — F17.200 NICOTINE DEPENDENCE: Primary | ICD-10-CM

## 2022-05-12 PROCEDURE — 99999 PR PBB SHADOW E&M-EST. PATIENT-LVL I: ICD-10-PCS | Mod: PBBFAC,,,

## 2022-05-12 PROCEDURE — 99402 PREV MED CNSL INDIV APPRX 30: CPT | Mod: S$GLB,,,

## 2022-05-12 PROCEDURE — 99402 PR PREVENT COUNSEL,INDIV,30 MIN: ICD-10-PCS | Mod: S$GLB,,,

## 2022-05-12 PROCEDURE — 99999 PR PBB SHADOW E&M-EST. PATIENT-LVL I: CPT | Mod: PBBFAC,,,

## 2022-05-12 RX ORDER — BUPROPION HYDROCHLORIDE 150 MG/1
150 TABLET, EXTENDED RELEASE ORAL DAILY
Qty: 30 TABLET | Refills: 0 | Status: SHIPPED | OUTPATIENT
Start: 2022-05-12 | End: 2022-06-12

## 2022-05-12 RX ORDER — NICOTINE 7MG/24HR
1 PATCH, TRANSDERMAL 24 HOURS TRANSDERMAL DAILY
Qty: 28 PATCH | Refills: 0 | Status: SHIPPED | OUTPATIENT
Start: 2022-05-12 | End: 2022-07-19

## 2022-05-12 NOTE — PROGRESS NOTES
Individual Follow-Up Form    5/12/2022    Quit Date: 04/12/2022    Clinical Status of Patient: Outpatient        Continuing Medication: yes  Wellbutrin or Patches         Target Symptoms: Withdrawal and medication side effects. The following were  rated moderate (3) to severe (4) on TCRS:  · Moderate (3): none  · Severe (4): none    Comments: Telephonic visit .    Patient is tobacco free.  Pt remains on tobacco cessation medication of 150 mg Wellbutrin QD and 7 mg nicotine patch QD and  No adverse effects noted at this time. Congratulated patient on his /her success Reviewed coping strategies/habitual behavior/relapse prevention with patient. Discussion included pt takes new route to and from work which seems to help with her quit.  Pt states she feels little crave and able to work through it, she is rewarding herself by planning to buy a new pair of shoes.   Patient understands she can call CTTS at any time.       Diagnosis: F17.210    Next Visit: 2 weeks

## 2022-06-01 ENCOUNTER — CLINICAL SUPPORT (OUTPATIENT)
Dept: SMOKING CESSATION | Facility: CLINIC | Age: 52
End: 2022-06-01
Payer: COMMERCIAL

## 2022-06-01 DIAGNOSIS — F17.200 NICOTINE DEPENDENCE: Primary | ICD-10-CM

## 2022-06-01 PROCEDURE — 99999 PR PBB SHADOW E&M-EST. PATIENT-LVL I: CPT | Mod: PBBFAC,,,

## 2022-06-01 PROCEDURE — 99404 PR PREVENT COUNSEL,INDIV,60 MIN: ICD-10-PCS | Mod: S$GLB,,,

## 2022-06-01 PROCEDURE — 99404 PREV MED CNSL INDIV APPRX 60: CPT | Mod: S$GLB,,,

## 2022-06-01 PROCEDURE — 99999 PR PBB SHADOW E&M-EST. PATIENT-LVL I: ICD-10-PCS | Mod: PBBFAC,,,

## 2022-06-01 NOTE — PROGRESS NOTES
Individual Follow-Up Form    6/1/2022    Quit Date:     Clinical Status of Patient: Outpatient        Continuing Medication: yes  Wellbutrin or Patches       Target Symptoms: Withdrawal and medication side effects. The following were  rated moderate (3) to severe (4) on TCRS:  · Moderate (3): ---  · Severe (4): ---    Comments: Telephonic visit .  Patient remains quit.  Pt remains on tobacco cessation medication of  150 mg Wellbutrin SR QD, 7 mg nicotine patch QD  No adverse effects noted at this time. She is using distraction and positive thinking to help her with her craves. Session #3 Reviewed strategies, controlling environment, cues, triggers, new goals set. Introduced high risk situations with preparation interventions, caffeine similarities with withdrawal issues of habit and nicotine,  Understanding urges, cravings, stress and relaxation. Open discussion with intervention discussion.Congratulated patient on her continued success.  Patient understands she can call CTTS at any time.      Diagnosis: F17.210    Next Visit: 2 weeks

## 2022-06-10 ENCOUNTER — OFFICE VISIT (OUTPATIENT)
Dept: PSYCHIATRY | Facility: CLINIC | Age: 52
End: 2022-06-10
Payer: COMMERCIAL

## 2022-06-10 ENCOUNTER — PATIENT MESSAGE (OUTPATIENT)
Dept: PSYCHIATRY | Facility: CLINIC | Age: 52
End: 2022-06-10
Payer: COMMERCIAL

## 2022-06-10 DIAGNOSIS — F40.243 FEAR OF FLYING: ICD-10-CM

## 2022-06-10 DIAGNOSIS — F41.0 GENERALIZED ANXIETY DISORDER WITH PANIC ATTACKS: Primary | ICD-10-CM

## 2022-06-10 DIAGNOSIS — F40.00 AGORAPHOBIA: ICD-10-CM

## 2022-06-10 DIAGNOSIS — F41.1 GENERALIZED ANXIETY DISORDER WITH PANIC ATTACKS: Primary | ICD-10-CM

## 2022-06-10 PROCEDURE — 90785 PR INTERACTIVE COMPLEXITY: ICD-10-PCS | Mod: 95,,, | Performed by: SOCIAL WORKER

## 2022-06-10 PROCEDURE — 1159F PR MEDICATION LIST DOCUMENTED IN MEDICAL RECORD: ICD-10-PCS | Mod: CPTII,95,, | Performed by: SOCIAL WORKER

## 2022-06-10 PROCEDURE — 90785 PSYTX COMPLEX INTERACTIVE: CPT | Mod: 95,,, | Performed by: SOCIAL WORKER

## 2022-06-10 PROCEDURE — 90837 PR PSYCHOTHERAPY W/PATIENT, 60 MIN: ICD-10-PCS | Mod: 95,,, | Performed by: SOCIAL WORKER

## 2022-06-10 PROCEDURE — 1159F MED LIST DOCD IN RCRD: CPT | Mod: CPTII,95,, | Performed by: SOCIAL WORKER

## 2022-06-10 PROCEDURE — 90837 PSYTX W PT 60 MINUTES: CPT | Mod: 95,,, | Performed by: SOCIAL WORKER
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
## 2022-06-15 ENCOUNTER — CLINICAL SUPPORT (OUTPATIENT)
Dept: SMOKING CESSATION | Facility: CLINIC | Age: 52
End: 2022-06-15
Payer: COMMERCIAL

## 2022-06-15 DIAGNOSIS — F17.200 NICOTINE DEPENDENCE: Primary | ICD-10-CM

## 2022-06-15 PROCEDURE — 99999 PR PBB SHADOW E&M-EST. PATIENT-LVL I: CPT | Mod: PBBFAC,,,

## 2022-06-15 PROCEDURE — 99999 PR PBB SHADOW E&M-EST. PATIENT-LVL I: ICD-10-PCS | Mod: PBBFAC,,,

## 2022-06-15 PROCEDURE — 99403 PREV MED CNSL INDIV APPRX 45: CPT | Mod: S$GLB,,,

## 2022-06-15 PROCEDURE — 99403 PR PREVENT COUNSEL,INDIV,45 MIN: ICD-10-PCS | Mod: S$GLB,,,

## 2022-06-15 NOTE — PROGRESS NOTES
Individual Follow-Up Form    6/15/2022    Quit Date: 4/12/22    Clinical Status of Patient: Outpatient        Continuing Medication: yes  Wellbutrin or Patches         Target Symptoms: Withdrawal and medication side effects. The following were  rated moderate (3) to severe (4) on TCRS:  · Moderate (3): none  · Severe (4): none    Comments: Telephonic visit .    Patient is tobacco free.  Pt remains on tobacco cessation medication of 150 mg Wellbutrin QD  7 mg nicotine patch QD . No adverse effects noted at this time. Congratulated patient on her success Reviewed coping strategies/habitual behavior/relapse prevention with patient.  She is doing very well , still quit and dealing with the cravings.  She is also doing intermittent fasting diet and is losing weight as well.  Her son is away for the summer with his father but it has not affected her in a negative way . She states it gives her time to reward herself and not feel bad about it .  Patient understands she can call CTTS at any time.        Diagnosis: F17.210    Next Visit: 1 week

## 2022-06-28 ENCOUNTER — OFFICE VISIT (OUTPATIENT)
Dept: PSYCHIATRY | Facility: CLINIC | Age: 52
End: 2022-06-28
Payer: COMMERCIAL

## 2022-06-28 DIAGNOSIS — F40.00 AGORAPHOBIA: ICD-10-CM

## 2022-06-28 DIAGNOSIS — F41.1 GENERALIZED ANXIETY DISORDER WITH PANIC ATTACKS: Primary | ICD-10-CM

## 2022-06-28 DIAGNOSIS — F41.0 GENERALIZED ANXIETY DISORDER WITH PANIC ATTACKS: Primary | ICD-10-CM

## 2022-06-28 PROCEDURE — 1159F MED LIST DOCD IN RCRD: CPT | Mod: CPTII,S$GLB,, | Performed by: SOCIAL WORKER

## 2022-06-28 PROCEDURE — 1159F PR MEDICATION LIST DOCUMENTED IN MEDICAL RECORD: ICD-10-PCS | Mod: CPTII,S$GLB,, | Performed by: SOCIAL WORKER

## 2022-06-28 PROCEDURE — 90785 PSYTX COMPLEX INTERACTIVE: CPT | Mod: S$GLB,,, | Performed by: SOCIAL WORKER

## 2022-06-28 PROCEDURE — 90837 PR PSYCHOTHERAPY W/PATIENT, 60 MIN: ICD-10-PCS | Mod: S$GLB,,, | Performed by: SOCIAL WORKER

## 2022-06-28 PROCEDURE — 90785 PR INTERACTIVE COMPLEXITY: ICD-10-PCS | Mod: S$GLB,,, | Performed by: SOCIAL WORKER

## 2022-06-28 PROCEDURE — 90837 PSYTX W PT 60 MINUTES: CPT | Mod: S$GLB,,, | Performed by: SOCIAL WORKER

## 2022-06-28 NOTE — PROGRESS NOTES
The patient location is: At home ([place])  The chief complaint leading to consultation is: anxiety    Visit type: audiovisual    Face to Face time with patient: 60  70 minutes of total time spent on the encounter, which includes face to face time and non-face to face time preparing to see the patient (eg, review of tests), Obtaining and/or reviewing separately obtained history, Documenting clinical information in the electronic or other health record, Independently interpreting results (not separately reported) and communicating results to the patient/family/caregiver, or Care coordination (not separately reported).         Each patient to whom he or she provides medical services by telemedicine is:  (1) informed of the relationship between the physician and patient and the respective role of any other health care provider with respect to management of the patient; and (2) notified that he or she may decline to receive medical services by telemedicine and may withdraw from such care at any time.    Notes:   Individual Psychotherapy (LCSW/PhD)  Isaias Heaven,  6/28/2022    Site:  [facility]         Therapeutic Intervention: Met with patient for individual psychotherapy.    Chief complaint/reason for encounter: depression and anxiety     Interval history and content of current session: Pt began the session by reporting she is still involved with her neighbor, wanting to cut that off. She has done the dating phuong, and [email or web address]. PT reported she wants to end everything with the neighbor. She shared that last weekend, she went to [place], and went shopping, and then went to the beach. She stopped at the [employer] on the way home and had a good time. She reported she like it so much that she decided to do another trip to the [facility] on July 5th. She further reported booked a micro needling appt for her neck. Therapist provided education on reframing strategies, as she is struggling with motivation to end this  "relationship that she feels "trapped" in. Pt was given a hand-out to increase understanding of the concepts. PT was receptive to the education. She denies SI, no HI or AVH.     Treatment plan:  · Target symptoms: depression, anxiety   · Why chosen therapy is appropriate versus another modality: relevant to diagnosis, patient responds to this modality, evidence based practice  · Outcome monitoring methods: self-report, observation, checklist/rating scale  · Therapeutic intervention type: insight oriented psychotherapy, behavior modifying psychotherapy, supportive psychotherapy    Risk parameters:  Patient reports no suicidal ideation  Patient reports no homicidal ideation  Patient reports no self-injurious behavior  Patient reports no violent behavior    Verbal deficits: None    Patient's response to intervention:  The patient's response to intervention is accepting, motivated.    Progress toward goals and other mental status changes:  The patient's progress toward goals is fair .    Diagnosis:     ICD-10-CM ICD-9-CM   1. Generalized anxiety disorder with panic attacks  F41.1 300.02    F41.0 300.01   2. Agoraphobia  F40.00 300.22       Plan: Pt plans to continue individual psychotherapy    Return to clinic: as scheduled    Length of Service (minutes): 60      "

## 2022-06-29 ENCOUNTER — CLINICAL SUPPORT (OUTPATIENT)
Dept: SMOKING CESSATION | Facility: CLINIC | Age: 52
End: 2022-06-29
Payer: COMMERCIAL

## 2022-06-29 DIAGNOSIS — F17.200 NICOTINE DEPENDENCE: ICD-10-CM

## 2022-06-29 PROCEDURE — 99403 PREV MED CNSL INDIV APPRX 45: CPT | Mod: S$GLB,,,

## 2022-06-29 PROCEDURE — 99403 PR PREVENT COUNSEL,INDIV,45 MIN: ICD-10-PCS | Mod: S$GLB,,,

## 2022-06-29 PROCEDURE — 99999 PR PBB SHADOW E&M-EST. PATIENT-LVL I: ICD-10-PCS | Mod: PBBFAC,,,

## 2022-06-29 PROCEDURE — 99999 PR PBB SHADOW E&M-EST. PATIENT-LVL I: CPT | Mod: PBBFAC,,,

## 2022-06-29 RX ORDER — BUPROPION HYDROCHLORIDE 150 MG/1
150 TABLET, EXTENDED RELEASE ORAL 2 TIMES DAILY
Qty: 60 TABLET | Refills: 0 | Status: SHIPPED | OUTPATIENT
Start: 2022-06-29 | End: 2022-07-19

## 2022-07-01 NOTE — PROGRESS NOTES
Telephonic visit .    Patient is tobacco free.  Pt remains on tobacco cessation medication of 150 mg Wellbutrin QD  7 mg nicotine patch QD . No adverse effects noted at this time. Patient is proud of herself and is doing very well. She is using distraction and staying busy . Encouraged her to reward herself for her hard work.  Reviewed coping strategies/habitual behavior/relapse prevention with patient.Patient understands she can call CTTS at any time.

## 2022-07-14 ENCOUNTER — CLINICAL SUPPORT (OUTPATIENT)
Dept: SMOKING CESSATION | Facility: CLINIC | Age: 52
End: 2022-07-14
Payer: COMMERCIAL

## 2022-07-14 DIAGNOSIS — F17.200 NICOTINE DEPENDENCE: Primary | ICD-10-CM

## 2022-07-14 PROCEDURE — 99404 PR PREVENT COUNSEL,INDIV,60 MIN: ICD-10-PCS | Mod: S$GLB,,,

## 2022-07-14 PROCEDURE — 99404 PREV MED CNSL INDIV APPRX 60: CPT | Mod: S$GLB,,,

## 2022-07-14 NOTE — PROGRESS NOTES
Individual Follow-Up Form    7/14/2022    Quit Date: 4/12/22    Clinical Status of Patient: Outpatient        Continuing Medication: yes  Wellbutrin         Target Symptoms: Withdrawal and medication side effects. The following were  rated moderate (3) to severe (4) on TCRS:  · Moderate (3): none  · Severe (4): none    Comments: Telephonic visit .   Patient is doing fantastic.  Patient remains tobacco free.  Pt remains on tobacco cessation medication of  150 mg Wellbutrin QD.  No adverse effects noted at this time. Congratulated patient on her continued success Reviewed coping strategies/habitual behavior/relapse prevention with patient.  Patient understands she can call CTTS at any time.      Diagnosis: F17.210    Next Visit: 2 weeks

## 2022-07-19 ENCOUNTER — OFFICE VISIT (OUTPATIENT)
Dept: PSYCHIATRY | Facility: CLINIC | Age: 52
End: 2022-07-19
Payer: COMMERCIAL

## 2022-07-19 VITALS
SYSTOLIC BLOOD PRESSURE: 127 MMHG | BODY MASS INDEX: 28.72 KG/M2 | HEART RATE: 76 BPM | WEIGHT: 177.94 LBS | DIASTOLIC BLOOD PRESSURE: 74 MMHG

## 2022-07-19 DIAGNOSIS — F41.1 GENERALIZED ANXIETY DISORDER WITH PANIC ATTACKS: ICD-10-CM

## 2022-07-19 DIAGNOSIS — F40.00 AGORAPHOBIA: ICD-10-CM

## 2022-07-19 DIAGNOSIS — F41.0 GENERALIZED ANXIETY DISORDER WITH PANIC ATTACKS: Primary | ICD-10-CM

## 2022-07-19 DIAGNOSIS — F40.00 AGORAPHOBIA: Primary | ICD-10-CM

## 2022-07-19 DIAGNOSIS — F40.243 FEAR OF FLYING: ICD-10-CM

## 2022-07-19 DIAGNOSIS — F41.0 GENERALIZED ANXIETY DISORDER WITH PANIC ATTACKS: ICD-10-CM

## 2022-07-19 DIAGNOSIS — F41.1 GENERALIZED ANXIETY DISORDER WITH PANIC ATTACKS: Primary | ICD-10-CM

## 2022-07-19 PROCEDURE — 3074F PR MOST RECENT SYSTOLIC BLOOD PRESSURE < 130 MM HG: ICD-10-PCS | Mod: CPTII,S$GLB,, | Performed by: PHYSICIAN ASSISTANT

## 2022-07-19 PROCEDURE — 99999 PR PBB SHADOW E&M-EST. PATIENT-LVL II: CPT | Mod: PBBFAC,,, | Performed by: PHYSICIAN ASSISTANT

## 2022-07-19 PROCEDURE — 1159F MED LIST DOCD IN RCRD: CPT | Mod: CPTII,S$GLB,, | Performed by: SOCIAL WORKER

## 2022-07-19 PROCEDURE — 1159F PR MEDICATION LIST DOCUMENTED IN MEDICAL RECORD: ICD-10-PCS | Mod: CPTII,S$GLB,, | Performed by: SOCIAL WORKER

## 2022-07-19 PROCEDURE — 3078F DIAST BP <80 MM HG: CPT | Mod: CPTII,S$GLB,, | Performed by: PHYSICIAN ASSISTANT

## 2022-07-19 PROCEDURE — 90837 PSYTX W PT 60 MINUTES: CPT | Mod: S$GLB,,, | Performed by: SOCIAL WORKER

## 2022-07-19 PROCEDURE — 90792 PR PSYCHIATRIC DIAGNOSTIC EVALUATION W/MEDICAL SERVICES: ICD-10-PCS | Mod: S$GLB,,, | Performed by: PHYSICIAN ASSISTANT

## 2022-07-19 PROCEDURE — 99999 PR PBB SHADOW E&M-EST. PATIENT-LVL II: ICD-10-PCS | Mod: PBBFAC,,, | Performed by: PHYSICIAN ASSISTANT

## 2022-07-19 PROCEDURE — 90837 PR PSYCHOTHERAPY W/PATIENT, 60 MIN: ICD-10-PCS | Mod: S$GLB,,, | Performed by: SOCIAL WORKER

## 2022-07-19 PROCEDURE — 90792 PSYCH DIAG EVAL W/MED SRVCS: CPT | Mod: S$GLB,,, | Performed by: PHYSICIAN ASSISTANT

## 2022-07-19 PROCEDURE — 3078F PR MOST RECENT DIASTOLIC BLOOD PRESSURE < 80 MM HG: ICD-10-PCS | Mod: CPTII,S$GLB,, | Performed by: PHYSICIAN ASSISTANT

## 2022-07-19 PROCEDURE — 3008F PR BODY MASS INDEX (BMI) DOCUMENTED: ICD-10-PCS | Mod: CPTII,S$GLB,, | Performed by: PHYSICIAN ASSISTANT

## 2022-07-19 PROCEDURE — 3008F BODY MASS INDEX DOCD: CPT | Mod: CPTII,S$GLB,, | Performed by: PHYSICIAN ASSISTANT

## 2022-07-19 PROCEDURE — 3074F SYST BP LT 130 MM HG: CPT | Mod: CPTII,S$GLB,, | Performed by: PHYSICIAN ASSISTANT

## 2022-07-19 RX ORDER — VENLAFAXINE HYDROCHLORIDE 37.5 MG/1
37.5 CAPSULE, EXTENDED RELEASE ORAL DAILY
Qty: 30 CAPSULE | Refills: 1 | Status: SHIPPED | OUTPATIENT
Start: 2022-07-19 | End: 2022-08-25 | Stop reason: SDUPTHER

## 2022-07-19 RX ORDER — ALPRAZOLAM 0.25 MG/1
0.25 TABLET ORAL 2 TIMES DAILY PRN
Qty: 60 TABLET | Refills: 1 | Status: SHIPPED | OUTPATIENT
Start: 2022-07-19 | End: 2022-10-31 | Stop reason: SDUPTHER

## 2022-07-19 NOTE — PROGRESS NOTES
"Individual Psychotherapy (LCSW/PhD)  Isaias Collado,  7/19/2022    Site:  [facility]         Therapeutic Intervention: Met with patient for individual psychotherapy.    Chief complaint/reason for encounter: depression and anxiety     Interval history and content of current session: Pt began the session by reporting she did a trip and took some time off of work. She booked a cabin in [facility] [place] [facility]. She brought a friend, and she said, "We had a good time." She has another road trip, coming up. She is going  her son in [place], [place]. She is doing some cosmetic procedures, August 12th. She deleted the neighbors number and blocked him and deleted the text messages. We processed her son's parks with dyslexia and her concerns regarding his upcoming transition to high school. She denies SI, no HI or AVH.     Treatment plan:  · Target symptoms: depression, anxiety   · Why chosen therapy is appropriate versus another modality: relevant to diagnosis, patient responds to this modality, evidence based practice  · Outcome monitoring methods: self-report, observation, checklist/rating scale  · Therapeutic intervention type: insight oriented psychotherapy, behavior modifying psychotherapy, supportive psychotherapy    Risk parameters:  Patient reports no suicidal ideation  Patient reports no homicidal ideation  Patient reports no self-injurious behavior  Patient reports no violent behavior    Verbal deficits: None    Patient's response to intervention:  The patient's response to intervention is accepting, motivated.    Progress toward goals and other mental status changes:  The patient's progress toward goals is fair .    Diagnosis:     ICD-10-CM ICD-9-CM   1. Generalized anxiety disorder with panic attacks  F41.1 300.02    F41.0 300.01   2. Agoraphobia  F40.00 300.22   3. Fear of flying  F40.243 300.29       Plan: Pt plans to continue individual psychotherapy    Return to clinic: as scheduled    Length of Service " (minutes): 60
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
## 2022-07-28 ENCOUNTER — CLINICAL SUPPORT (OUTPATIENT)
Dept: SMOKING CESSATION | Facility: CLINIC | Age: 52
End: 2022-07-28
Payer: COMMERCIAL

## 2022-07-28 DIAGNOSIS — F17.200 NICOTINE DEPENDENCE: Primary | ICD-10-CM

## 2022-07-28 PROCEDURE — 99404 PREV MED CNSL INDIV APPRX 60: CPT | Mod: S$GLB,,,

## 2022-07-28 PROCEDURE — 99999 PR PBB SHADOW E&M-EST. PATIENT-LVL I: ICD-10-PCS | Mod: PBBFAC,,,

## 2022-07-28 PROCEDURE — 99999 PR PBB SHADOW E&M-EST. PATIENT-LVL I: CPT | Mod: PBBFAC,,,

## 2022-07-28 PROCEDURE — 99404 PR PREVENT COUNSEL,INDIV,60 MIN: ICD-10-PCS | Mod: S$GLB,,,

## 2022-07-28 NOTE — PROGRESS NOTES
Individual Follow-Up Form    7/28/2022    Quit Date: 4/12/22    Clinical Status of Patient: Outpatient        Continuing Medication: no         Target Symptoms: Withdrawal and medication side effects. The following were rated moderate (3) to severe (4) on TCRS:  · Moderate (3): none  · Severe (4): none    Comments: Telephonic visit .  Patient is tobacco free.  Pt no longer using any smoking cessation medication at this time. Congratulated patient on her success Reviewed coping strategies/habitual behavior/relapse prevention with patient.   Patient understands she can call CTTS at any time. Will do follow ups every 4 weeks at this time.           Diagnosis: F17.210

## 2022-08-03 ENCOUNTER — OFFICE VISIT (OUTPATIENT)
Dept: PSYCHIATRY | Facility: CLINIC | Age: 52
End: 2022-08-03
Payer: COMMERCIAL

## 2022-08-03 DIAGNOSIS — F40.00 AGORAPHOBIA: ICD-10-CM

## 2022-08-03 DIAGNOSIS — F41.0 GENERALIZED ANXIETY DISORDER WITH PANIC ATTACKS: Primary | ICD-10-CM

## 2022-08-03 DIAGNOSIS — F41.1 GENERALIZED ANXIETY DISORDER WITH PANIC ATTACKS: Primary | ICD-10-CM

## 2022-08-03 PROCEDURE — 90785 PSYTX COMPLEX INTERACTIVE: CPT | Mod: S$GLB,,, | Performed by: SOCIAL WORKER

## 2022-08-03 PROCEDURE — 90837 PR PSYCHOTHERAPY W/PATIENT, 60 MIN: ICD-10-PCS | Mod: S$GLB,,, | Performed by: SOCIAL WORKER

## 2022-08-03 PROCEDURE — 1159F PR MEDICATION LIST DOCUMENTED IN MEDICAL RECORD: ICD-10-PCS | Mod: CPTII,S$GLB,, | Performed by: SOCIAL WORKER

## 2022-08-03 PROCEDURE — 90837 PSYTX W PT 60 MINUTES: CPT | Mod: S$GLB,,, | Performed by: SOCIAL WORKER

## 2022-08-03 PROCEDURE — 90785 PR INTERACTIVE COMPLEXITY: ICD-10-PCS | Mod: S$GLB,,, | Performed by: SOCIAL WORKER

## 2022-08-03 PROCEDURE — 1159F MED LIST DOCD IN RCRD: CPT | Mod: CPTII,S$GLB,, | Performed by: SOCIAL WORKER

## 2022-08-03 NOTE — PROGRESS NOTES
"Individual Psychotherapy (LCSW/PhD)  Isaias Collado,  8/3/2022    Site:  [facility]         Therapeutic Intervention: Met with patient for individual psychotherapy.    Chief complaint/reason for encounter: depression and anxiety     Interval history and content of current session: Pt began the session by reporting she saw DREW Dunbar and he put on Effexor. PT reported she has been feeling a little "dreamy, relaxed" with the new medication. Pt reported she had been driving through rain yesterday without anxiety. She drove to [place] to  her son. Her son is starting school next Friday. She is doing some cosmetic procedures, August 12th. She continues to be sober from cigarettes. She signed up for a skin study for rosacea. 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 She is sleeping really well now, but continues to feel fatigue. She reported she dreads social situations, dreading the baby shower this weekend. Therapist provided education on strategies to reframe thinking. Hand-out was provided to pt to increase understanding of concepts. PT reported after she feels good. She denies SI, no HI or AVH.     Treatment plan:  · Target symptoms: depression, anxiety   · Why chosen therapy is appropriate versus another modality: relevant to diagnosis, patient responds to this modality, evidence based practice  · Outcome monitoring methods: self-report, observation, checklist/rating scale  · Therapeutic intervention type: insight oriented psychotherapy, behavior modifying psychotherapy, supportive psychotherapy    Risk parameters:  Patient reports no suicidal ideation  Patient reports no homicidal ideation  Patient reports no self-injurious behavior  Patient reports no violent behavior    Verbal deficits: None    Patient's response to intervention:  The patient's response to intervention is accepting, motivated.    Progress toward goals and other mental status changes:  The patient's progress toward goals is fair .    Diagnosis:     " ICD-10-CM ICD-9-CM   1. Generalized anxiety disorder with panic attacks  F41.1 300.02    F41.0 300.01   2. Agoraphobia  F40.00 300.22       Plan: Pt plans to continue individual psychotherapy    Return to clinic: as scheduled    Length of Service (minutes): 60

## 2022-08-15 ENCOUNTER — OFFICE VISIT (OUTPATIENT)
Dept: PSYCHIATRY | Facility: CLINIC | Age: 52
End: 2022-08-15
Payer: COMMERCIAL

## 2022-08-15 DIAGNOSIS — F40.00 AGORAPHOBIA: ICD-10-CM

## 2022-08-15 DIAGNOSIS — F41.1 GENERALIZED ANXIETY DISORDER WITH PANIC ATTACKS: Primary | ICD-10-CM

## 2022-08-15 DIAGNOSIS — F40.243 FEAR OF FLYING: ICD-10-CM

## 2022-08-15 DIAGNOSIS — F41.0 GENERALIZED ANXIETY DISORDER WITH PANIC ATTACKS: Primary | ICD-10-CM

## 2022-08-15 PROCEDURE — 90837 PSYTX W PT 60 MINUTES: CPT | Mod: S$GLB,,, | Performed by: SOCIAL WORKER

## 2022-08-15 PROCEDURE — 1159F PR MEDICATION LIST DOCUMENTED IN MEDICAL RECORD: ICD-10-PCS | Mod: CPTII,S$GLB,, | Performed by: SOCIAL WORKER

## 2022-08-15 PROCEDURE — 1159F MED LIST DOCD IN RCRD: CPT | Mod: CPTII,S$GLB,, | Performed by: SOCIAL WORKER

## 2022-08-15 PROCEDURE — 90837 PR PSYCHOTHERAPY W/PATIENT, 60 MIN: ICD-10-PCS | Mod: S$GLB,,, | Performed by: SOCIAL WORKER

## 2022-08-15 NOTE — PROGRESS NOTES
Individual Psychotherapy (LCSW/PhD)  Isaias Collado,  8/15/2022    Site:  [facility]         Therapeutic Intervention: Met with patient for individual psychotherapy.    Chief complaint/reason for encounter: depression and anxiety     Interval history and content of current session: Pt reported she had a procedure. She reported she thinks she had a allergic reaction to the medication they gave her for numbing. The neighbor contacted her last week. She reported her son was not registered at [facility], when they went to the orientation, so he had to start at [place]. She and her mom are talking about getting a property in [place], which she now needs in order for her son to go to [facility]. She reported she wants to go back to the beginning. She is doing the reframing to boost her mood. Her co-workers are supportive of her therapy. She denies SI, no HI or AVH.      Treatment plan:  · Target symptoms: depression, anxiety   · Why chosen therapy is appropriate versus another modality: relevant to diagnosis, patient responds to this modality, evidence based practice  · Outcome monitoring methods: self-report, observation, checklist/rating scale  · Therapeutic intervention type: insight oriented psychotherapy, behavior modifying psychotherapy, supportive psychotherapy    Risk parameters:  Patient reports no suicidal ideation  Patient reports no homicidal ideation  Patient reports no self-injurious behavior  Patient reports no violent behavior    Verbal deficits: None    Patient's response to intervention:  The patient's response to intervention is accepting, motivated.    Progress toward goals and other mental status changes:  The patient's progress toward goals is fair .    Diagnosis:     ICD-10-CM ICD-9-CM   1. Generalized anxiety disorder with panic attacks  F41.1 300.02    F41.0 300.01   2. Agoraphobia  F40.00 300.22   3. Fear of flying  F40.243 300.29       Plan: Pt plans to continue individual  psychotherapy    Return to clinic: as scheduled    Length of Service (minutes): 60

## 2022-08-25 ENCOUNTER — TELEPHONE (OUTPATIENT)
Dept: SMOKING CESSATION | Facility: CLINIC | Age: 52
End: 2022-08-25
Payer: COMMERCIAL

## 2022-08-25 ENCOUNTER — OFFICE VISIT (OUTPATIENT)
Dept: PSYCHIATRY | Facility: CLINIC | Age: 52
End: 2022-08-25
Payer: COMMERCIAL

## 2022-08-25 VITALS
BODY MASS INDEX: 28.63 KG/M2 | SYSTOLIC BLOOD PRESSURE: 134 MMHG | HEART RATE: 70 BPM | WEIGHT: 177.38 LBS | DIASTOLIC BLOOD PRESSURE: 71 MMHG

## 2022-08-25 DIAGNOSIS — F41.0 GENERALIZED ANXIETY DISORDER WITH PANIC ATTACKS: ICD-10-CM

## 2022-08-25 DIAGNOSIS — F40.00 AGORAPHOBIA: Primary | ICD-10-CM

## 2022-08-25 DIAGNOSIS — F41.1 GENERALIZED ANXIETY DISORDER WITH PANIC ATTACKS: ICD-10-CM

## 2022-08-25 PROCEDURE — 99999 PR PBB SHADOW E&M-EST. PATIENT-LVL II: CPT | Mod: PBBFAC,,, | Performed by: PHYSICIAN ASSISTANT

## 2022-08-25 PROCEDURE — 3008F BODY MASS INDEX DOCD: CPT | Mod: CPTII,S$GLB,, | Performed by: PHYSICIAN ASSISTANT

## 2022-08-25 PROCEDURE — 3008F PR BODY MASS INDEX (BMI) DOCUMENTED: ICD-10-PCS | Mod: CPTII,S$GLB,, | Performed by: PHYSICIAN ASSISTANT

## 2022-08-25 PROCEDURE — 3075F PR MOST RECENT SYSTOLIC BLOOD PRESS GE 130-139MM HG: ICD-10-PCS | Mod: CPTII,S$GLB,, | Performed by: PHYSICIAN ASSISTANT

## 2022-08-25 PROCEDURE — 90833 PR PSYCHOTHERAPY W/PATIENT W/E&M, 30 MIN (ADD ON): ICD-10-PCS | Mod: S$GLB,,, | Performed by: PHYSICIAN ASSISTANT

## 2022-08-25 PROCEDURE — 90833 PSYTX W PT W E/M 30 MIN: CPT | Mod: S$GLB,,, | Performed by: PHYSICIAN ASSISTANT

## 2022-08-25 PROCEDURE — 3078F DIAST BP <80 MM HG: CPT | Mod: CPTII,S$GLB,, | Performed by: PHYSICIAN ASSISTANT

## 2022-08-25 PROCEDURE — 99214 PR OFFICE/OUTPT VISIT, EST, LEVL IV, 30-39 MIN: ICD-10-PCS | Mod: S$GLB,,, | Performed by: PHYSICIAN ASSISTANT

## 2022-08-25 PROCEDURE — 3078F PR MOST RECENT DIASTOLIC BLOOD PRESSURE < 80 MM HG: ICD-10-PCS | Mod: CPTII,S$GLB,, | Performed by: PHYSICIAN ASSISTANT

## 2022-08-25 PROCEDURE — 99214 OFFICE O/P EST MOD 30 MIN: CPT | Mod: S$GLB,,, | Performed by: PHYSICIAN ASSISTANT

## 2022-08-25 PROCEDURE — 3075F SYST BP GE 130 - 139MM HG: CPT | Mod: CPTII,S$GLB,, | Performed by: PHYSICIAN ASSISTANT

## 2022-08-25 PROCEDURE — 99999 PR PBB SHADOW E&M-EST. PATIENT-LVL II: ICD-10-PCS | Mod: PBBFAC,,, | Performed by: PHYSICIAN ASSISTANT

## 2022-08-25 RX ORDER — VENLAFAXINE HYDROCHLORIDE 75 MG/1
75 CAPSULE, EXTENDED RELEASE ORAL DAILY
Qty: 30 CAPSULE | Refills: 2 | Status: SHIPPED | OUTPATIENT
Start: 2022-08-25 | End: 2022-10-31 | Stop reason: SDUPTHER

## 2022-08-25 NOTE — TELEPHONE ENCOUNTER
[facility]- called patient  for  telephonic appointment. Left message to call back to reschedule [phone] or  [phone].
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
## 2022-08-31 ENCOUNTER — OFFICE VISIT (OUTPATIENT)
Dept: PSYCHIATRY | Facility: CLINIC | Age: 52
End: 2022-08-31
Payer: COMMERCIAL

## 2022-08-31 DIAGNOSIS — F41.0 GENERALIZED ANXIETY DISORDER WITH PANIC ATTACKS: Primary | ICD-10-CM

## 2022-08-31 DIAGNOSIS — F40.00 AGORAPHOBIA: ICD-10-CM

## 2022-08-31 DIAGNOSIS — F41.1 GENERALIZED ANXIETY DISORDER WITH PANIC ATTACKS: Primary | ICD-10-CM

## 2022-08-31 PROCEDURE — 90837 PSYTX W PT 60 MINUTES: CPT | Mod: S$GLB,,, | Performed by: SOCIAL WORKER

## 2022-08-31 PROCEDURE — 90837 PR PSYCHOTHERAPY W/PATIENT, 60 MIN: ICD-10-PCS | Mod: S$GLB,,, | Performed by: SOCIAL WORKER
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
## 2022-09-14 ENCOUNTER — OFFICE VISIT (OUTPATIENT)
Dept: PSYCHIATRY | Facility: CLINIC | Age: 52
End: 2022-09-14
Payer: COMMERCIAL

## 2022-09-14 DIAGNOSIS — F41.0 GENERALIZED ANXIETY DISORDER WITH PANIC ATTACKS: Primary | ICD-10-CM

## 2022-09-14 DIAGNOSIS — F40.00 AGORAPHOBIA: ICD-10-CM

## 2022-09-14 DIAGNOSIS — F41.1 GENERALIZED ANXIETY DISORDER WITH PANIC ATTACKS: Primary | ICD-10-CM

## 2022-09-14 PROCEDURE — 90837 PSYTX W PT 60 MINUTES: CPT | Mod: S$GLB,,, | Performed by: SOCIAL WORKER

## 2022-09-14 PROCEDURE — 1159F MED LIST DOCD IN RCRD: CPT | Mod: CPTII,S$GLB,, | Performed by: SOCIAL WORKER

## 2022-09-14 PROCEDURE — 90837 PR PSYCHOTHERAPY W/PATIENT, 60 MIN: ICD-10-PCS | Mod: S$GLB,,, | Performed by: SOCIAL WORKER

## 2022-09-14 PROCEDURE — 1159F PR MEDICATION LIST DOCUMENTED IN MEDICAL RECORD: ICD-10-PCS | Mod: CPTII,S$GLB,, | Performed by: SOCIAL WORKER

## 2022-09-14 NOTE — PROGRESS NOTES
Individual Psychotherapy (LCSW/PhD)  Isaias Collado,  2022    Site:  [facility]         Therapeutic Intervention: Met with patient for individual psychotherapy.    Chief complaint/reason for encounter: depression and anxiety     Interval history and content of current session: Pt reported she is planning a trip to [facility] for . Her daughter Armando is meeting them there and she is excited to see her grandkids. She has 3 more neck procedures. She is doing the skin study and she believes she got the placebo. She believes her son is doing well in school. She reported he has a good group of friends. Pt reported her second daughter, Francisco Javier had selective mutism as a child. Then pt reported she moved out in her senior year of high school, with her high school boyfriend. Their father was a drug addict and he was in and out of their lives. She reported he got hooked on oxycodone. Then he  right after Melissa, OD on methadone, and the girls were 12 and 14 at the time. She felt like a burden to her parents when Francisco Javier was born, because her  at the time had disappeared. Therefore, when her  came back, and wanted her to move to [place], she left to get out of her parents house. She regrets not calling the  and reporting neglect on her . She ended up getting pregnant for a 3rd child with this man, and she made the decision with the support of her mother to go through with an . We discussed picking up with the distress of the  for our next session. She denies SI, no HI or AVH.     Treatment plan:  Target symptoms: depression, anxiety   Why chosen therapy is appropriate versus another modality: relevant to diagnosis, patient responds to this modality, evidence based practice  Outcome monitoring methods: self-report, observation, checklist/rating scale  Therapeutic intervention type: insight oriented psychotherapy, behavior modifying psychotherapy, supportive  psychotherapy    Risk parameters:  Patient reports no suicidal ideation  Patient reports no homicidal ideation  Patient reports no self-injurious behavior  Patient reports no violent behavior    Verbal deficits: None    Patient's response to intervention:  The patient's response to intervention is accepting, motivated.    Progress toward goals and other mental status changes:  The patient's progress toward goals is fair .    Diagnosis:     ICD-10-CM ICD-9-CM   1. Generalized anxiety disorder with panic attacks  F41.1 300.02    F41.0 300.01   2. Agoraphobia  F40.00 300.22         Plan: Pt plans to continue individual psychotherapy    Return to clinic: as scheduled    Length of Service (minutes): 60

## 2022-09-16 ENCOUNTER — CLINICAL SUPPORT (OUTPATIENT)
Dept: SMOKING CESSATION | Facility: CLINIC | Age: 52
End: 2022-09-16
Payer: COMMERCIAL

## 2022-09-16 DIAGNOSIS — F17.200 NICOTINE DEPENDENCE: Primary | ICD-10-CM

## 2022-09-16 PROCEDURE — 99999 PR PBB SHADOW E&M-EST. PATIENT-LVL I: CPT | Mod: PBBFAC,,,

## 2022-09-16 PROCEDURE — 99407 PR TOBACCO USE CESSATION INTENSIVE >10 MINUTES: ICD-10-PCS | Mod: S$GLB,,,

## 2022-09-16 PROCEDURE — 99407 BEHAV CHNG SMOKING > 10 MIN: CPT | Mod: S$GLB,,,

## 2022-09-16 PROCEDURE — 99999 PR PBB SHADOW E&M-EST. PATIENT-LVL I: ICD-10-PCS | Mod: PBBFAC,,,

## 2022-10-13 ENCOUNTER — CLINICAL SUPPORT (OUTPATIENT)
Dept: SMOKING CESSATION | Facility: CLINIC | Age: 52
End: 2022-10-13
Payer: COMMERCIAL

## 2022-10-13 ENCOUNTER — OFFICE VISIT (OUTPATIENT)
Dept: PSYCHIATRY | Facility: CLINIC | Age: 52
End: 2022-10-13
Payer: COMMERCIAL

## 2022-10-13 DIAGNOSIS — F17.200 NICOTINE DEPENDENCE: Primary | ICD-10-CM

## 2022-10-13 DIAGNOSIS — F41.0 GENERALIZED ANXIETY DISORDER WITH PANIC ATTACKS: Primary | ICD-10-CM

## 2022-10-13 DIAGNOSIS — F41.1 GENERALIZED ANXIETY DISORDER WITH PANIC ATTACKS: Primary | ICD-10-CM

## 2022-10-13 DIAGNOSIS — F40.243 FEAR OF FLYING: ICD-10-CM

## 2022-10-13 PROCEDURE — 1159F PR MEDICATION LIST DOCUMENTED IN MEDICAL RECORD: ICD-10-PCS | Mod: CPTII,S$GLB,, | Performed by: SOCIAL WORKER

## 2022-10-13 PROCEDURE — 90837 PR PSYCHOTHERAPY W/PATIENT, 60 MIN: ICD-10-PCS | Mod: S$GLB,,, | Performed by: SOCIAL WORKER

## 2022-10-13 PROCEDURE — 99999 PR PBB SHADOW E&M-EST. PATIENT-LVL I: ICD-10-PCS | Mod: PBBFAC,,, | Performed by: SOCIAL WORKER

## 2022-10-13 PROCEDURE — 1159F MED LIST DOCD IN RCRD: CPT | Mod: CPTII,S$GLB,, | Performed by: SOCIAL WORKER

## 2022-10-13 PROCEDURE — 99407 PR TOBACCO USE CESSATION INTENSIVE >10 MINUTES: ICD-10-PCS | Mod: S$GLB,,, | Performed by: GENERAL PRACTICE

## 2022-10-13 PROCEDURE — 90837 PSYTX W PT 60 MINUTES: CPT | Mod: S$GLB,,, | Performed by: SOCIAL WORKER

## 2022-10-13 PROCEDURE — 99407 BEHAV CHNG SMOKING > 10 MIN: CPT | Mod: S$GLB,,, | Performed by: GENERAL PRACTICE

## 2022-10-13 PROCEDURE — 99999 PR PBB SHADOW E&M-EST. PATIENT-LVL I: CPT | Mod: PBBFAC,,,

## 2022-10-13 PROCEDURE — 99999 PR PBB SHADOW E&M-EST. PATIENT-LVL I: ICD-10-PCS | Mod: PBBFAC,,,

## 2022-10-13 PROCEDURE — 99999 PR PBB SHADOW E&M-EST. PATIENT-LVL I: CPT | Mod: PBBFAC,,, | Performed by: SOCIAL WORKER
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
## 2022-10-20 ENCOUNTER — CLINICAL SUPPORT (OUTPATIENT)
Dept: SMOKING CESSATION | Facility: CLINIC | Age: 52
End: 2022-10-20
Payer: COMMERCIAL

## 2022-10-20 DIAGNOSIS — F17.200 NICOTINE DEPENDENCE: Primary | ICD-10-CM

## 2022-10-20 PROCEDURE — 99999 PR PBB SHADOW E&M-EST. PATIENT-LVL I: ICD-10-PCS | Mod: PBBFAC,,,

## 2022-10-20 PROCEDURE — 99403 PREV MED CNSL INDIV APPRX 45: CPT | Mod: S$GLB,,,

## 2022-10-20 PROCEDURE — 99403 PR PREVENT COUNSEL,INDIV,45 MIN: ICD-10-PCS | Mod: S$GLB,,,

## 2022-10-20 PROCEDURE — 99999 PR PBB SHADOW E&M-EST. PATIENT-LVL I: CPT | Mod: PBBFAC,,,

## 2022-10-20 NOTE — PROGRESS NOTES
Individual Follow-Up Form    10/20/2022    Quit Date: 4/12/22    Clinical Status of Patient: Outpatient        Continuing Medication: no         Target Symptoms: Withdrawal and medication side effects. The following were  rated moderate (3) to severe (4) on TCRS:  Moderate (3): ----  Severe (4): ------    Comments: Telephonic visit .    Patient remains  tobacco free. Patient is not using any smoking cessation medications . She continues to do very well , discussion included remaining a non -smoking , benefits available to her .   Congratulated patient on her success Reviewed coping strategies/habitual behavior/relapse prevention with patient.   Patient understands she can call CTTS at any time.        Diagnosis: F17.210    Next Visit: finished program

## 2022-10-26 ENCOUNTER — OFFICE VISIT (OUTPATIENT)
Dept: PSYCHIATRY | Facility: CLINIC | Age: 52
End: 2022-10-26
Payer: COMMERCIAL

## 2022-10-26 DIAGNOSIS — F41.0 GENERALIZED ANXIETY DISORDER WITH PANIC ATTACKS: ICD-10-CM

## 2022-10-26 DIAGNOSIS — F40.00 AGORAPHOBIA: Primary | ICD-10-CM

## 2022-10-26 DIAGNOSIS — F41.1 GENERALIZED ANXIETY DISORDER WITH PANIC ATTACKS: ICD-10-CM

## 2022-10-26 PROCEDURE — 1159F PR MEDICATION LIST DOCUMENTED IN MEDICAL RECORD: ICD-10-PCS | Mod: CPTII,S$GLB,, | Performed by: SOCIAL WORKER

## 2022-10-26 PROCEDURE — 90837 PR PSYCHOTHERAPY W/PATIENT, 60 MIN: ICD-10-PCS | Mod: S$GLB,,, | Performed by: SOCIAL WORKER

## 2022-10-26 PROCEDURE — 1159F MED LIST DOCD IN RCRD: CPT | Mod: CPTII,S$GLB,, | Performed by: SOCIAL WORKER

## 2022-10-26 PROCEDURE — 90837 PSYTX W PT 60 MINUTES: CPT | Mod: S$GLB,,, | Performed by: SOCIAL WORKER
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
## 2022-10-31 ENCOUNTER — OFFICE VISIT (OUTPATIENT)
Dept: PSYCHIATRY | Facility: CLINIC | Age: 52
End: 2022-10-31
Payer: COMMERCIAL

## 2022-10-31 DIAGNOSIS — F41.0 GENERALIZED ANXIETY DISORDER WITH PANIC ATTACKS: ICD-10-CM

## 2022-10-31 DIAGNOSIS — F40.00 AGORAPHOBIA: Primary | ICD-10-CM

## 2022-10-31 DIAGNOSIS — F41.1 GENERALIZED ANXIETY DISORDER WITH PANIC ATTACKS: ICD-10-CM

## 2022-10-31 PROCEDURE — 99213 OFFICE O/P EST LOW 20 MIN: CPT | Mod: 95,S$GLB,, | Performed by: PHYSICIAN ASSISTANT

## 2022-10-31 PROCEDURE — 99213 PR OFFICE/OUTPT VISIT, EST, LEVL III, 20-29 MIN: ICD-10-PCS | Mod: 95,S$GLB,, | Performed by: PHYSICIAN ASSISTANT

## 2022-10-31 PROCEDURE — 1159F MED LIST DOCD IN RCRD: CPT | Mod: CPTII,95,S$GLB, | Performed by: PHYSICIAN ASSISTANT

## 2022-10-31 PROCEDURE — 99999 PR PBB SHADOW E&M-EST. PATIENT-LVL III: ICD-10-PCS | Mod: PBBFAC,,, | Performed by: PHYSICIAN ASSISTANT

## 2022-10-31 PROCEDURE — 1160F RVW MEDS BY RX/DR IN RCRD: CPT | Mod: CPTII,95,S$GLB, | Performed by: PHYSICIAN ASSISTANT

## 2022-10-31 PROCEDURE — 1159F PR MEDICATION LIST DOCUMENTED IN MEDICAL RECORD: ICD-10-PCS | Mod: CPTII,95,S$GLB, | Performed by: PHYSICIAN ASSISTANT

## 2022-10-31 PROCEDURE — 1160F PR REVIEW ALL MEDS BY PRESCRIBER/CLIN PHARMACIST DOCUMENTED: ICD-10-PCS | Mod: CPTII,95,S$GLB, | Performed by: PHYSICIAN ASSISTANT

## 2022-10-31 PROCEDURE — 99999 PR PBB SHADOW E&M-EST. PATIENT-LVL III: CPT | Mod: PBBFAC,,, | Performed by: PHYSICIAN ASSISTANT

## 2022-10-31 RX ORDER — VENLAFAXINE HYDROCHLORIDE 75 MG/1
75 CAPSULE, EXTENDED RELEASE ORAL DAILY
Qty: 90 CAPSULE | Refills: 1 | Status: SHIPPED | OUTPATIENT
Start: 2022-10-31 | End: 2023-01-05 | Stop reason: SDUPTHER

## 2022-10-31 RX ORDER — ALPRAZOLAM 0.25 MG/1
0.25 TABLET ORAL 2 TIMES DAILY PRN
Qty: 60 TABLET | Refills: 1 | Status: SHIPPED | OUTPATIENT
Start: 2022-10-31 | End: 2023-01-05 | Stop reason: SDUPTHER

## 2022-10-31 NOTE — PROGRESS NOTES
"The patient location is: [place]    Visit type: audiovisual    Face to Face time with patient: 22  26 minutes of total time spent on the encounter, which includes face to face time and non-face to face time preparing to see the patient (eg, review of tests), Obtaining and/or reviewing separately obtained history, Documenting clinical information in the electronic or other health record, Independently interpreting results (not separately reported) and communicating results to the patient/family/caregiver, or Care coordination (not separately reported).         Each patient to whom he or she provides medical services by telemedicine is:  (1) informed of the relationship between the physician and patient and the respective role of any other health care provider with respect to management of the patient; and (2) notified that he or she may decline to receive medical services by telemedicine and may withdraw from such care at any time.    Notes:       Outpatient Psychiatry Follow-Up Visit (MD/LEANDRA)    10/31/2022    Clinical Status of Patient:  Outpatient (Ambulatory)    Chief Complaint:  Isaias Collado is a 52 y.o. female who presents today for follow-up of anxiety.  Met with patient.      Interval History and Content of Current Session 10/31/2022:    Pt reports today: "i've been feeling really good. All the side effects went away"    "A lot less panic attacks and anxiety." "Mood is good" "i've been doing a lot more-made appt with dentist that I avoid" "going to [facility] to visit my daughter"    Patients mood is steady and euthymic, affect appears mood congruent. Linear and logical, friendly and cooperative, good eye contact.    Denies SI/HI/AVH. Pt reports sleeping well and normal appetite. Denies side effects of medications.    Pt reports taking medications as prescribed and behaving appropriately during interview today.    States not dealing with neighbor who is a narcissist, has been short with him and not " "engaging in unhealthy relationship.      Prior visit:  Pt reports today: "feeling like the anxiety is getting better" Pt reports less frequency and intensity of panic attacks. States feeling less feelings of flushing and palpitations.    Discussed deep breath techniques at length as pt reports gets anxious regarding how she should breathe to try and calm herself down.    Reports anxiety as 8/10, down from 9/10. States baseline anxiety is lower and intensity of panic is decreased. Discussed with pt increasing effexor to 75mg to further improve symptoms of anxiety and panic attacks. Pt is agreeable to plan.    Discussed relationship with neighbor. States that there is decrease in phone contact which she understands is best decision "but I keep slipping up"    Reports xanax prn has been helpful for panic attacks and calm down before stressful situations.    Patients mood is steady and euthymic, affect appears mood congruent. Linear and logical, friendly and cooperative, good eye contact.    Denies SI/HI/AVH. Pt reports sleeping well and normal appetite. Denies side effects of medications.    Pt reports taking medications as prescribed and behaving appropriately during interview today.        Review of Systems     Review of Systems   Constitutional:  Negative for fever.   HENT:  Negative for sore throat.    Eyes:  Negative for photophobia.   Respiratory:  Negative for cough.    Cardiovascular:  Negative for chest pain and palpitations.   Gastrointestinal:  Negative for abdominal pain.   Genitourinary:  Negative for dysuria.   Musculoskeletal:  Negative for myalgias.   Skin:  Negative for rash.   Neurological:  Negative for dizziness.   Endo/Heme/Allergies:  Does not bruise/bleed easily.   Psychiatric/Behavioral:  Negative for depression, hallucinations, substance abuse and suicidal ideas. The patient is nervous/anxious. The patient does not have insomnia.      Psychiatric Review Of Systems - Is patient experiencing or " having changes in:  sleep: no  appetite: no  weight: no  energy/anergy: no  interest/pleasure/anhedonia: no  somatic symptoms: no  libido: no  anxiety/panic: yes  guilty/hopelessness: no  concentration: no  S.I.B.s/risky behavior: no  Irritability: no  Racing thoughts: yes  Impulsive behaviors: no  Paranoia: no  AVH: no      Past Medical, Family and Social History: The patient's past medical, family and social history have been reviewed and updated as appropriate within the electronic medical record - see encounter notes.      Current Medications:   Medication List with Changes/Refills   Current Medications    ALPRAZOLAM (XANAX) 0.25 MG TABLET    Take 1 tablet (0.25 mg total) by mouth 2 (two) times daily as needed for Anxiety.    CLOBETASOL (TEMOVATE) 0.05 % CREAM    Apply 1 application topically 2 (two) times daily.    DOXYCYCLINE (VIBRAMYCIN) 50 MG CAPSULE        ESTRADIOL (ESTRACE) 0.5 MG TABLET    Take 0.5 mg by mouth once daily.    HYDROCHLOROTHIAZIDE (HYDRODIURIL) 25 MG TABLET    Take 25 mg by mouth once daily.    IBUPROFEN (ADVIL,MOTRIN) 800 MG TABLET    Take 1 tablet (800 mg total) by mouth 3 (three) times daily.    LACTOBAC NO.41/BIFIDOBACT NO.7 (PROBIOTIC-10 ORAL)    Take by mouth.    OXYBUTYNIN (DITROPAN) 5 MG TAB    Take 5 mg by mouth 3 (three) times daily.    TRIAMCINOLONE ACETONIDE 0.5% (KENALOG) 0.5 % CREA        VENLAFAXINE (EFFEXOR-XR) 75 MG 24 HR CAPSULE    Take 1 capsule (75 mg total) by mouth once daily.         Allergies:   Review of patient's allergies indicates:   Allergen Reactions    Lisinopril Other (See Comments)     Cough.    Erythromycin Other (See Comments)     Nausea and cold sweats    Morphine Itching    Adhesive Rash     Paper tape ok         Vitals   There were no vitals filed for this visit.         Labs/Imaging/Studies:   No results found for this or any previous visit (from the past 48 hour(s)).   No results found for: PHENYTOIN, PHENOBARB, VALPROATE, CBMZ    Compliance:  yes    Side effects: None    Risk Parameters:  Patient reports no suicidal ideation  Patient reports no homicidal ideation  Patient reports no self-injurious behavior  Patient reports no violent behavior    Exam (detailed: at least 9 elements; comprehensive: all 15 elements)   Constitutional  Vitals:  Most recent vital signs, dated less than 90 days prior to this appointment, were reviewed.   There were no vitals filed for this visit.       General:  unremarkable, age appropriate     Musculoskeletal  Muscle Strength/Tone:  not examined   Gait & Station:  non-ataxic     Psychiatric  Speech:  no latency; no press   Mood & Affect:  steady, euthymic  congruent and appropriate   Thought Process:  normal and logical   Associations:  intact   Thought Content:  normal, no suicidality, no homicidality, delusions, or paranoia   Insight:  intact, has awareness of illness   Judgement: behavior is adequate to circumstances   Orientation:  grossly intact   Memory: intact for content of interview   Language: grossly intact   Attention Span & Concentration:  able to focus   Fund of Knowledge:  intact and appropriate to age and level of education     Assessment and Diagnosis   Status/Progress: Based on the examination today, the patient's problem(s) is/are improved and adequately but not ideally controlled.  New problems have not been presented today.   Co-morbidities, Diagnostic uncertainty and Lack of compliance are not complicating management of the primary condition.  There are no active rule-out diagnoses for this patient at this time.     General Impression:      ICD-10-CM ICD-9-CM   1. Agoraphobia  F40.00 300.22   2. Generalized anxiety disorder with panic attacks  F41.1 300.02    F41.0 300.01         Intervention/Counseling/Treatment Plan   Medication Management: Continue current medications. The risks and benefits of medication were discussed with the patient.    -continue effexor to 75mg daily    -continue xanax 0.25mg bid  prn panic attacks       Return to Clinic: 3 months        Scott Silva PA-C      Total face to face time: 22 min  Total time (chart review, patient contact, documentation): 26 min      *This note has been prepared using a combination of a dictation device and typing.  It has been checked for errors but some errors may still exist within the note as a result of speech recognition errors and/or typographical errors.

## 2022-11-08 ENCOUNTER — OFFICE VISIT (OUTPATIENT)
Dept: PSYCHIATRY | Facility: CLINIC | Age: 52
End: 2022-11-08
Payer: COMMERCIAL

## 2022-11-08 DIAGNOSIS — F41.1 GENERALIZED ANXIETY DISORDER WITH PANIC ATTACKS: Primary | ICD-10-CM

## 2022-11-08 DIAGNOSIS — F41.0 GENERALIZED ANXIETY DISORDER WITH PANIC ATTACKS: Primary | ICD-10-CM

## 2022-11-08 PROCEDURE — 1159F MED LIST DOCD IN RCRD: CPT | Mod: CPTII,S$GLB,, | Performed by: SOCIAL WORKER

## 2022-11-08 PROCEDURE — 90837 PR PSYCHOTHERAPY W/PATIENT, 60 MIN: ICD-10-PCS | Mod: S$GLB,,, | Performed by: SOCIAL WORKER

## 2022-11-08 PROCEDURE — 90837 PSYTX W PT 60 MINUTES: CPT | Mod: S$GLB,,, | Performed by: SOCIAL WORKER

## 2022-11-08 PROCEDURE — 1159F PR MEDICATION LIST DOCUMENTED IN MEDICAL RECORD: ICD-10-PCS | Mod: CPTII,S$GLB,, | Performed by: SOCIAL WORKER
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
## 2022-11-10 PROBLEM — F40.00 AGORAPHOBIA: Status: RESOLVED | Noted: 2022-03-30 | Resolved: 2022-11-10

## 2023-01-04 ENCOUNTER — OFFICE VISIT (OUTPATIENT)
Dept: PSYCHIATRY | Facility: CLINIC | Age: 53
End: 2023-01-04
Payer: COMMERCIAL

## 2023-01-04 DIAGNOSIS — F41.1 GENERALIZED ANXIETY DISORDER WITH PANIC ATTACKS: Primary | ICD-10-CM

## 2023-01-04 DIAGNOSIS — F41.0 GENERALIZED ANXIETY DISORDER WITH PANIC ATTACKS: Primary | ICD-10-CM

## 2023-01-04 PROCEDURE — 1159F PR MEDICATION LIST DOCUMENTED IN MEDICAL RECORD: ICD-10-PCS | Mod: CPTII,S$GLB,, | Performed by: SOCIAL WORKER

## 2023-01-04 PROCEDURE — 90837 PR PSYCHOTHERAPY W/PATIENT, 60 MIN: ICD-10-PCS | Mod: S$GLB,,, | Performed by: SOCIAL WORKER

## 2023-01-04 PROCEDURE — 1159F MED LIST DOCD IN RCRD: CPT | Mod: CPTII,S$GLB,, | Performed by: SOCIAL WORKER

## 2023-01-04 PROCEDURE — 90837 PSYTX W PT 60 MINUTES: CPT | Mod: S$GLB,,, | Performed by: SOCIAL WORKER

## 2023-01-04 NOTE — PROGRESS NOTES
"Individual Psychotherapy (LCSW/PhD)  Isaias Collado,  1/4/2023    Site:  [facility]         Therapeutic Intervention: Met with patient for individual psychotherapy.    Chief complaint/reason for encounter: depression and anxiety     Interval history and content of current session: Pt reported the trip was not what she thought it would be, because her daughter stayed with lesbian coupled, who are their friends. PT reported they did all do Thanksgiving together; which was "really good." PT reported Danis had a lot of fun. Her mom is now back in [place]. She is going spend a week in February. Pt reported the medicine is doing great, but she finds she is gaining weight. She booked an appt with with DREW Dunbar to discuss meds. PT reported she wants to get finances in order and her weight back down; which are her goals for the new year. In next session we discussed processing her 's visit and her teen years. She denies SI, no HI or AVH.     Treatment plan:  Target symptoms: depression, anxiety   Why chosen therapy is appropriate versus another modality: relevant to diagnosis, patient responds to this modality, evidence based practice  Outcome monitoring methods: self-report, observation, checklist/rating scale  Therapeutic intervention type: insight oriented psychotherapy, behavior modifying psychotherapy, supportive psychotherapy    Risk parameters:  Patient reports no suicidal ideation  Patient reports no homicidal ideation  Patient reports no self-injurious behavior  Patient reports no violent behavior    Verbal deficits: None    Patient's response to intervention:  The patient's response to intervention is accepting, motivated.    Progress toward goals and other mental status changes:  The patient's progress toward goals is fair .    Diagnosis:     ICD-10-CM ICD-9-CM   1. Generalized anxiety disorder with panic attacks  F41.1 300.02    F41.0 300.01           Plan: Pt plans to continue individual " psychotherapy    Return to clinic: as scheduled    Length of Service (minutes): 60

## 2023-01-05 ENCOUNTER — OFFICE VISIT (OUTPATIENT)
Dept: PSYCHIATRY | Facility: CLINIC | Age: 53
End: 2023-01-05
Payer: COMMERCIAL

## 2023-01-05 DIAGNOSIS — F41.0 GENERALIZED ANXIETY DISORDER WITH PANIC ATTACKS: Primary | ICD-10-CM

## 2023-01-05 DIAGNOSIS — F40.00 AGORAPHOBIA: ICD-10-CM

## 2023-01-05 DIAGNOSIS — F41.1 GENERALIZED ANXIETY DISORDER WITH PANIC ATTACKS: Primary | ICD-10-CM

## 2023-01-05 PROCEDURE — 1159F MED LIST DOCD IN RCRD: CPT | Mod: CPTII,95,, | Performed by: PHYSICIAN ASSISTANT

## 2023-01-05 PROCEDURE — 1160F PR REVIEW ALL MEDS BY PRESCRIBER/CLIN PHARMACIST DOCUMENTED: ICD-10-PCS | Mod: CPTII,95,, | Performed by: PHYSICIAN ASSISTANT

## 2023-01-05 PROCEDURE — 1160F RVW MEDS BY RX/DR IN RCRD: CPT | Mod: CPTII,95,, | Performed by: PHYSICIAN ASSISTANT

## 2023-01-05 PROCEDURE — 99214 OFFICE O/P EST MOD 30 MIN: CPT | Mod: 95,,, | Performed by: PHYSICIAN ASSISTANT

## 2023-01-05 PROCEDURE — 1159F PR MEDICATION LIST DOCUMENTED IN MEDICAL RECORD: ICD-10-PCS | Mod: CPTII,95,, | Performed by: PHYSICIAN ASSISTANT

## 2023-01-05 PROCEDURE — 99214 PR OFFICE/OUTPT VISIT, EST, LEVL IV, 30-39 MIN: ICD-10-PCS | Mod: 95,,, | Performed by: PHYSICIAN ASSISTANT

## 2023-01-05 RX ORDER — BUSPIRONE HYDROCHLORIDE 10 MG/1
10 TABLET ORAL 2 TIMES DAILY
Qty: 60 TABLET | Refills: 3 | Status: SHIPPED | OUTPATIENT
Start: 2023-01-05 | End: 2023-02-27 | Stop reason: SDUPTHER

## 2023-01-05 RX ORDER — VENLAFAXINE HYDROCHLORIDE 75 MG/1
75 CAPSULE, EXTENDED RELEASE ORAL DAILY
Qty: 90 CAPSULE | Refills: 1 | Status: SHIPPED | OUTPATIENT
Start: 2023-01-05 | End: 2023-02-27 | Stop reason: SDUPTHER

## 2023-01-05 RX ORDER — ALPRAZOLAM 0.25 MG/1
0.25 TABLET ORAL 2 TIMES DAILY PRN
Qty: 60 TABLET | Refills: 1 | Status: SHIPPED | OUTPATIENT
Start: 2023-01-05 | End: 2023-02-27 | Stop reason: SDUPTHER

## 2023-01-05 NOTE — PROGRESS NOTES
"The patient location is: [place]    Visit type: audiovisual      Each patient to whom he or she provides medical services by telemedicine is:  (1) informed of the relationship between the physician and patient and the respective role of any other health care provider with respect to management of the patient; and (2) notified that he or she may decline to receive medical services by telemedicine and may withdraw from such care at any time.    Notes:       Outpatient Psychiatry Follow-Up Visit (MD/LEANDRA)    1/5/2023    Clinical Status of Patient:  Outpatient (Ambulatory)    Chief Complaint:  Isaias Collado is a 52 y.o. female who presents today for follow-up of anxiety.  Met with patient.      Interval History and Content of Current Session 01/05/2023:    Pt reports today: "I feel really good". States mood is "great overall, niurka been traveling, doing more things."    Pt does report having sexual side effects and discussed with pt can add buspar to reverse these and also help decrease anxiety. Pt is agreeable to plan    Patients mood is euthymic, affect appears mood congruent. Linear and logical, friendly and cooperative, good eye contact.    Denies SI/HI/AVH. Pt reports sleeping well and normal appetite. Having sexual side effects- inorgasmia.    Pt reports taking medications as prescribed and behaving appropriately during interview today.        Prior visit:  Pt reports today: "i've been feeling really good. All the side effects went away"    "A lot less panic attacks and anxiety." "Mood is good" "i've been doing a lot more-made appt with dentist that I avoid" "going to [facility] to visit my daughter"    Patients mood is steady and euthymic, affect appears mood congruent. Linear and logical, friendly and cooperative, good eye contact.    Denies SI/HI/AVH. Pt reports sleeping well and normal appetite. Denies side effects of medications.    Pt reports taking medications as prescribed and behaving appropriately during " interview today.    States not dealing with neighbor who is a narcissist, has been short with him and not engaging in unhealthy relationship.    Review of Systems     Review of Systems   Constitutional:  Negative for fever.   HENT:  Negative for sore throat.    Eyes:  Negative for photophobia.   Respiratory:  Negative for cough.    Cardiovascular:  Negative for chest pain and palpitations.   Gastrointestinal:  Negative for abdominal pain.   Genitourinary:  Negative for dysuria.   Musculoskeletal:  Negative for myalgias.   Skin:  Negative for rash.   Neurological:  Negative for dizziness.   Endo/Heme/Allergies:  Does not bruise/bleed easily.   Psychiatric/Behavioral:  Negative for depression, hallucinations, substance abuse and suicidal ideas. The patient is nervous/anxious. The patient does not have insomnia.      Psychiatric Review Of Systems - Is patient experiencing or having changes in:  sleep: no  appetite: no  weight: no  energy/anergy: no  interest/pleasure/anhedonia: no  somatic symptoms: no  libido: no  anxiety/panic: yes  guilty/hopelessness: no  concentration: no  S.I.B.s/risky behavior: no  Irritability: no  Racing thoughts: yes  Impulsive behaviors: no  Paranoia: no  AVH: no      Past Medical, Family and Social History: The patient's past medical, family and social history have been reviewed and updated as appropriate within the electronic medical record - see encounter notes.      Current Medications:   Medication List with Changes/Refills   Current Medications    ALPRAZOLAM (XANAX) 0.25 MG TABLET    Take 1 tablet (0.25 mg total) by mouth 2 (two) times daily as needed for Anxiety.    CLOBETASOL (TEMOVATE) 0.05 % CREAM    Apply 1 application topically 2 (two) times daily.    DOXYCYCLINE (VIBRAMYCIN) 50 MG CAPSULE        ESTRADIOL (ESTRACE) 0.5 MG TABLET    Take 0.5 mg by mouth once daily.    HYDROCHLOROTHIAZIDE (HYDRODIURIL) 25 MG TABLET    Take 25 mg by mouth once daily.    IBUPROFEN (ADVIL,MOTRIN) 800  MG TABLET    Take 1 tablet (800 mg total) by mouth 3 (three) times daily.    LACTOBAC NO.41/BIFIDOBACT NO.7 (PROBIOTIC-10 ORAL)    Take by mouth.    OXYBUTYNIN (DITROPAN) 5 MG TAB    Take 5 mg by mouth 3 (three) times daily.    TRIAMCINOLONE ACETONIDE 0.5% (KENALOG) 0.5 % CREA        VENLAFAXINE (EFFEXOR-XR) 75 MG 24 HR CAPSULE    Take 1 capsule (75 mg total) by mouth once daily.         Allergies:   Review of patient's allergies indicates:   Allergen Reactions    Lisinopril Other (See Comments)     Cough.    Erythromycin Other (See Comments)     Nausea and cold sweats    Morphine Itching    Adhesive Rash     Paper tape ok         Vitals   There were no vitals filed for this visit.         Labs/Imaging/Studies:   No results found for this or any previous visit (from the past 48 hour(s)).   No results found for: PHENYTOIN, PHENOBARB, VALPROATE, CBMZ    Compliance: yes    Side effects: orgasmic dysfunction    Risk Parameters:  Patient reports no suicidal ideation  Patient reports no homicidal ideation  Patient reports no self-injurious behavior  Patient reports no violent behavior    Exam (detailed: at least 9 elements; comprehensive: all 15 elements)   Constitutional  Vitals:  Most recent vital signs, dated less than 90 days prior to this appointment, were reviewed.   There were no vitals filed for this visit.       General:  unremarkable, age appropriate     Musculoskeletal  Muscle Strength/Tone:  not examined   Gait & Station:  non-ataxic     Psychiatric  Speech:  no latency; no press   Mood & Affect:  steady, euthymic  congruent and appropriate   Thought Process:  normal and logical   Associations:  intact   Thought Content:  normal, no suicidality, no homicidality, delusions, or paranoia   Insight:  intact, has awareness of illness   Judgement: behavior is adequate to circumstances   Orientation:  grossly intact   Memory: intact for content of interview   Language: grossly intact   Attention Span & Concentration:   able to focus   Fund of Knowledge:  intact and appropriate to age and level of education     Assessment and Diagnosis   Status/Progress: Based on the examination today, the patient's problem(s) is/are improved and adequately but not ideally controlled.  New problems have not been presented today.   Co-morbidities, Diagnostic uncertainty and Lack of compliance are not complicating management of the primary condition.  There are no active rule-out diagnoses for this patient at this time.     General Impression:      ICD-10-CM ICD-9-CM   1. Generalized anxiety disorder with panic attacks  F41.1 300.02    F41.0 300.01   2. Agoraphobia  F40.00 300.22         Intervention/Counseling/Treatment Plan   Medication Management: Continue current medications. The risks and benefits of medication were discussed with the patient.    -continue effexor to 75mg daily    -start buspar 10mg bid    -continue xanax 0.25mg bid prn panic attacks       Return to Clinic: 1 months        Scott Silva PA-C      Total face to face time: 13 min  Total time (chart review, patient contact, documentation): 21 min      *This note has been prepared using a combination of a dictation device and typing.  It has been checked for errors but some errors may still exist within the note as a result of speech recognition errors and/or typographical errors.

## 2023-01-18 ENCOUNTER — OFFICE VISIT (OUTPATIENT)
Dept: PSYCHIATRY | Facility: CLINIC | Age: 53
End: 2023-01-18
Payer: COMMERCIAL

## 2023-01-18 DIAGNOSIS — F41.1 GENERALIZED ANXIETY DISORDER WITH PANIC ATTACKS: Primary | ICD-10-CM

## 2023-01-18 DIAGNOSIS — F41.0 GENERALIZED ANXIETY DISORDER WITH PANIC ATTACKS: Primary | ICD-10-CM

## 2023-01-18 PROCEDURE — 90837 PSYTX W PT 60 MINUTES: CPT | Mod: S$GLB,,, | Performed by: SOCIAL WORKER

## 2023-01-18 PROCEDURE — 1159F MED LIST DOCD IN RCRD: CPT | Mod: CPTII,S$GLB,, | Performed by: SOCIAL WORKER

## 2023-01-18 PROCEDURE — 90837 PR PSYCHOTHERAPY W/PATIENT, 60 MIN: ICD-10-PCS | Mod: S$GLB,,, | Performed by: SOCIAL WORKER

## 2023-01-18 PROCEDURE — 1159F PR MEDICATION LIST DOCUMENTED IN MEDICAL RECORD: ICD-10-PCS | Mod: CPTII,S$GLB,, | Performed by: SOCIAL WORKER

## 2023-01-18 NOTE — PROGRESS NOTES
Individual Psychotherapy (LCSW/PhD)  Isaias Collado,  1/18/2023    Site:  [facility]         Therapeutic Intervention: Met with patient for individual psychotherapy.    Chief complaint/reason for encounter: depression and anxiety     Interval history and content of current session: Pt reported she got some feedback from a male co-worker at work about the neighbor and it was really helpful. She has been scrolling the facebook phuong. Pt reported she did match with someone and reached out. PT reported at 16 she got involved with a [id number] manager, who started an affair with her. She reported she got really depressed after moving with him. She reported up until then they had a very fun-loving relationship. Pt reported she is also worrying about her weight. We discussed having this conversation with the psychiatrist about medication side-effects, which she did. She believes this is due to her quitting smoking. Therapist was in agreeance.  She denies SI, no HI or AVH.     Treatment plan:  Target symptoms: depression, anxiety   Why chosen therapy is appropriate versus another modality: relevant to diagnosis, patient responds to this modality, evidence based practice  Outcome monitoring methods: self-report, observation, checklist/rating scale  Therapeutic intervention type: insight oriented psychotherapy, behavior modifying psychotherapy, supportive psychotherapy    Risk parameters:  Patient reports no suicidal ideation  Patient reports no homicidal ideation  Patient reports no self-injurious behavior  Patient reports no violent behavior    Verbal deficits: None    Patient's response to intervention:  The patient's response to intervention is accepting, motivated.    Progress toward goals and other mental status changes:  The patient's progress toward goals is fair .    Diagnosis:     ICD-10-CM ICD-9-CM   1. Generalized anxiety disorder with panic attacks  F41.1 300.02    F41.0 300.01             Plan: Pt plans to  continue individual psychotherapy    Return to clinic: as scheduled    Length of Service (minutes): 60

## 2023-02-27 ENCOUNTER — OFFICE VISIT (OUTPATIENT)
Dept: PSYCHIATRY | Facility: CLINIC | Age: 53
End: 2023-02-27
Payer: COMMERCIAL

## 2023-02-27 DIAGNOSIS — F41.0 GENERALIZED ANXIETY DISORDER WITH PANIC ATTACKS: Primary | ICD-10-CM

## 2023-02-27 DIAGNOSIS — F40.00 AGORAPHOBIA: ICD-10-CM

## 2023-02-27 DIAGNOSIS — F41.1 GENERALIZED ANXIETY DISORDER WITH PANIC ATTACKS: Primary | ICD-10-CM

## 2023-02-27 DIAGNOSIS — F17.200 NICOTINE DEPENDENCE, UNCOMPLICATED, UNSPECIFIED NICOTINE PRODUCT TYPE: ICD-10-CM

## 2023-02-27 PROCEDURE — 1160F RVW MEDS BY RX/DR IN RCRD: CPT | Mod: CPTII,95,, | Performed by: PHYSICIAN ASSISTANT

## 2023-02-27 PROCEDURE — 99214 OFFICE O/P EST MOD 30 MIN: CPT | Mod: 95,,, | Performed by: PHYSICIAN ASSISTANT

## 2023-02-27 PROCEDURE — 1160F PR REVIEW ALL MEDS BY PRESCRIBER/CLIN PHARMACIST DOCUMENTED: ICD-10-PCS | Mod: CPTII,95,, | Performed by: PHYSICIAN ASSISTANT

## 2023-02-27 PROCEDURE — 1159F MED LIST DOCD IN RCRD: CPT | Mod: CPTII,95,, | Performed by: PHYSICIAN ASSISTANT

## 2023-02-27 PROCEDURE — 90785 PR INTERACTIVE COMPLEXITY: ICD-10-PCS | Mod: S$GLB,,, | Performed by: SOCIAL WORKER

## 2023-02-27 PROCEDURE — 1159F MED LIST DOCD IN RCRD: CPT | Mod: CPTII,S$GLB,, | Performed by: SOCIAL WORKER

## 2023-02-27 PROCEDURE — 1159F PR MEDICATION LIST DOCUMENTED IN MEDICAL RECORD: ICD-10-PCS | Mod: CPTII,95,, | Performed by: PHYSICIAN ASSISTANT

## 2023-02-27 PROCEDURE — 1159F PR MEDICATION LIST DOCUMENTED IN MEDICAL RECORD: ICD-10-PCS | Mod: CPTII,S$GLB,, | Performed by: SOCIAL WORKER

## 2023-02-27 PROCEDURE — 90837 PR PSYCHOTHERAPY W/PATIENT, 60 MIN: ICD-10-PCS | Mod: S$GLB,,, | Performed by: SOCIAL WORKER

## 2023-02-27 PROCEDURE — 99214 PR OFFICE/OUTPT VISIT, EST, LEVL IV, 30-39 MIN: ICD-10-PCS | Mod: 95,,, | Performed by: PHYSICIAN ASSISTANT

## 2023-02-27 PROCEDURE — 90837 PSYTX W PT 60 MINUTES: CPT | Mod: S$GLB,,, | Performed by: SOCIAL WORKER

## 2023-02-27 PROCEDURE — 90785 PSYTX COMPLEX INTERACTIVE: CPT | Mod: S$GLB,,, | Performed by: SOCIAL WORKER

## 2023-02-27 RX ORDER — BUSPIRONE HYDROCHLORIDE 10 MG/1
10 TABLET ORAL 2 TIMES DAILY
Qty: 180 TABLET | Refills: 1 | Status: SHIPPED | OUTPATIENT
Start: 2023-02-27 | End: 2023-07-27

## 2023-02-27 RX ORDER — ALPRAZOLAM 0.25 MG/1
0.25 TABLET ORAL 2 TIMES DAILY PRN
Qty: 60 TABLET | Refills: 3 | Status: SHIPPED | OUTPATIENT
Start: 2023-02-27 | End: 2023-05-31 | Stop reason: SDUPTHER

## 2023-02-27 RX ORDER — VENLAFAXINE HYDROCHLORIDE 75 MG/1
75 CAPSULE, EXTENDED RELEASE ORAL DAILY
Qty: 90 CAPSULE | Refills: 1 | Status: SHIPPED | OUTPATIENT
Start: 2023-02-27 | End: 2023-05-31 | Stop reason: SDUPTHER

## 2023-02-27 NOTE — PROGRESS NOTES
Individual Psychotherapy (LCSW/PhD)  Isaias Collado,  2/27/2023    [facility]         Therapeutic Intervention: Met with patient for individual psychotherapy.    Chief complaint/reason for encounter: depression and anxiety     Interval history and content of current session: Pt went to see her mom and had a good time. She reported she did get her wisdom teeth out. Her son's father has stage 2, colon cancer. Her friend had a heart attack this past weekend. 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 PT reported her brother, Scott, is going to haul some of the thrift item to pt. PT reported he has been pretty helpful. Therapist assisted pt in booking through June to prevent care gaps and decrease stress.  She denies SI, no HI or AVH.     Treatment plan:  Target symptoms: depression, anxiety   Why chosen therapy is appropriate versus another modality: relevant to diagnosis, patient responds to this modality, evidence based practice  Outcome monitoring methods: self-report, observation, checklist/rating scale  Therapeutic intervention type: insight oriented psychotherapy, behavior modifying psychotherapy, supportive psychotherapy    Risk parameters:  Patient reports no suicidal ideation  Patient reports no homicidal ideation  Patient reports no self-injurious behavior  Patient reports no violent behavior    Verbal deficits: None    Patient's response to intervention:  The patient's response to intervention is accepting, motivated.    Progress toward goals and other mental status changes:  The patient's progress toward goals is fair .    Diagnosis:     ICD-10-CM ICD-9-CM   1. Generalized anxiety disorder with panic attacks  F41.1 300.02    F41.0 300.01       Plan: Pt plans to continue individual psychotherapy    Return to clinic: as scheduled    Length of Service (minutes): 60

## 2023-02-27 NOTE — PROGRESS NOTES
"The patient location is: [place]    Visit type: audiovisual      Each patient to whom he or she provides medical services by telemedicine is:  (1) informed of the relationship between the physician and patient and the respective role of any other health care provider with respect to management of the patient; and (2) notified that he or she may decline to receive medical services by telemedicine and may withdraw from such care at any time.    Notes:       Outpatient Psychiatry Follow-Up Visit (MD/LEANDRA)    2/27/2023    Clinical Status of Patient:  Outpatient (Ambulatory)    Chief Complaint:  Isaias Collado is a 52 y.o. female who presents today for follow-up of anxiety.  Met with patient.      Interval History and Content of Current Session 02/27/2023:    Pt reports today: "doing very good, medicine working good for me."    "Having a lot less anxiety, my energy is great, im starting a new business"    Patients mood is euthymic, affect appears mood congruent. Linear and logical, friendly and cooperative, good eye contact.    Denies SI/HI/AVH. Pt reports sleeping well and increased appetite. Reports recent weight gain. Discussed eating low carb diet and decreasing snacking throughout the day. Pt agreeable to plan    Pt reports taking medications as prescribed and behaving appropriately during interview today.          Prior visit:    Pt reports today: "I feel really good". States mood is "great overall, ni[place] been traveling, doing more things."    Pt does report having sexual side effects and discussed with pt can add buspar to reverse these and also help decrease anxiety. Pt is agreeable to plan    Patients mood is euthymic, affect appears mood congruent. Linear and logical, friendly and cooperative, good eye contact.    Denies SI/HI/AVH. Pt reports sleeping well and normal appetite. Having sexual side effects- inorgasmia.    Pt reports taking medications as prescribed and behaving appropriately during interview " today.    Review of Systems     Review of Systems   Constitutional:  Negative for fever.   HENT:  Negative for sore throat.    Eyes:  Negative for photophobia.   Respiratory:  Negative for cough.    Cardiovascular:  Negative for chest pain and palpitations.   Gastrointestinal:  Negative for abdominal pain.   Genitourinary:  Negative for dysuria.   Musculoskeletal:  Negative for myalgias.   Skin:  Negative for rash.   Neurological:  Negative for dizziness.   Endo/Heme/Allergies:  Does not bruise/bleed easily.   Psychiatric/Behavioral:  Negative for depression, hallucinations, substance abuse and suicidal ideas. The patient is nervous/anxious. The patient does not have insomnia.      Psychiatric Review Of Systems - Is patient experiencing or having changes in:  sleep: no  appetite: no  weight: no  energy/anergy: no  interest/pleasure/anhedonia: no  somatic symptoms: no  libido: no  anxiety/panic: no  guilty/hopelessness: no  concentration: no  S.I.B.s/risky behavior: no  Irritability: no  Racing thoughts: no  Impulsive behaviors: no  Paranoia: no  AVH: no      Past Medical, Family and Social History: The patient's past medical, family and social history have been reviewed and updated as appropriate within the electronic medical record - see encounter notes.      Current Medications:   Medication List with Changes/Refills   Current Medications    ALPRAZOLAM (XANAX) 0.25 MG TABLET    Take 1 tablet (0.25 mg total) by mouth 2 (two) times daily as needed for Anxiety.    BUSPIRONE (BUSPAR) 10 MG TABLET    Take 1 tablet (10 mg total) by mouth 2 (two) times daily.    CLOBETASOL (TEMOVATE) 0.05 % CREAM    Apply 1 application topically 2 (two) times daily.    DOXYCYCLINE (VIBRAMYCIN) 50 MG CAPSULE        ESTRADIOL (ESTRACE) 0.5 MG TABLET    Take 0.5 mg by mouth once daily.    HYDROCHLOROTHIAZIDE (HYDRODIURIL) 25 MG TABLET    Take 25 mg by mouth once daily.    IBUPROFEN (ADVIL,MOTRIN) 800 MG TABLET    Take 1 tablet (800 mg  total) by mouth 3 (three) times daily.    LACTOBAC NO.41/BIFIDOBACT NO.7 (PROBIOTIC-10 ORAL)    Take by mouth.    OXYBUTYNIN (DITROPAN) 5 MG TAB    Take 5 mg by mouth 3 (three) times daily.    TRIAMCINOLONE ACETONIDE 0.5% (KENALOG) 0.5 % CREA        VENLAFAXINE (EFFEXOR-XR) 75 MG 24 HR CAPSULE    Take 1 capsule (75 mg total) by mouth once daily.         Allergies:   Review of patient's allergies indicates:   Allergen Reactions    Lisinopril Other (See Comments)     Cough.    Erythromycin Other (See Comments)     Nausea and cold sweats    Morphine Itching    Adhesive Rash     Paper tape ok         Vitals   There were no vitals filed for this visit.         Labs/Imaging/Studies:   No results found for this or any previous visit (from the past 48 hour(s)).   No results found for: PHENYTOIN, PHENOBARB, VALPROATE, CBMZ    Compliance: yes    Side effects: orgasmic dysfunction    Risk Parameters:  Patient reports no suicidal ideation  Patient reports no homicidal ideation  Patient reports no self-injurious behavior  Patient reports no violent behavior    Exam (detailed: at least 9 elements; comprehensive: all 15 elements)   Constitutional  Vitals:  Most recent vital signs, dated less than 90 days prior to this appointment, were reviewed.   There were no vitals filed for this visit.       General:  unremarkable, age appropriate     Musculoskeletal  Muscle Strength/Tone:  not examined   Gait & Station:  non-ataxic     Psychiatric  Speech:  no latency; no press   Mood & Affect:  steady, euthymic  congruent and appropriate   Thought Process:  normal and logical   Associations:  intact   Thought Content:  normal, no suicidality, no homicidality, delusions, or paranoia   Insight:  intact, has awareness of illness   Judgement: behavior is adequate to circumstances   Orientation:  grossly intact   Memory: intact for content of interview   Language: grossly intact   Attention Span & Concentration:  able to focus   Fund of  Knowledge:  intact and appropriate to age and level of education     Assessment and Diagnosis   Status/Progress: Based on the examination today, the patient's problem(s) is/are improved and well controlled.  New problems have not been presented today.   Co-morbidities, Diagnostic uncertainty and Lack of compliance are not complicating management of the primary condition.  There are no active rule-out diagnoses for this patient at this time.     General Impression:      ICD-10-CM ICD-9-CM   1. Generalized anxiety disorder with panic attacks  F41.1 300.02    F41.0 300.01   2. Agoraphobia  F40.00 300.22   3. Nicotine dependence, uncomplicated, unspecified nicotine product type  F17.200 305.1           Intervention/Counseling/Treatment Plan   Medication Management: Continue current medications. The risks and benefits of medication were discussed with the patient.    -continue effexor to 75mg daily    -continue buspar 10mg bid    -continue xanax 0.25mg bid prn panic attacks       Return to Clinic: 3 month        Scott Silva PA-C      Total face to face time: 22 min  Total time (chart review, patient contact, documentation): 32 min      *This note has been prepared using a combination of a dictation device and typing.  It has been checked for errors but some errors may still exist within the note as a result of speech recognition errors and/or typographical errors.

## 2023-03-14 ENCOUNTER — OFFICE VISIT (OUTPATIENT)
Dept: PSYCHIATRY | Facility: CLINIC | Age: 53
End: 2023-03-14
Payer: COMMERCIAL

## 2023-03-14 DIAGNOSIS — F41.0 GENERALIZED ANXIETY DISORDER WITH PANIC ATTACKS: Primary | ICD-10-CM

## 2023-03-14 DIAGNOSIS — F41.1 GENERALIZED ANXIETY DISORDER WITH PANIC ATTACKS: Primary | ICD-10-CM

## 2023-03-14 PROCEDURE — 1159F MED LIST DOCD IN RCRD: CPT | Mod: CPTII,S$GLB,, | Performed by: SOCIAL WORKER

## 2023-03-14 PROCEDURE — 90785 PR INTERACTIVE COMPLEXITY: ICD-10-PCS | Mod: S$GLB,,, | Performed by: SOCIAL WORKER

## 2023-03-14 PROCEDURE — 90837 PSYTX W PT 60 MINUTES: CPT | Mod: S$GLB,,, | Performed by: SOCIAL WORKER

## 2023-03-14 PROCEDURE — 1159F PR MEDICATION LIST DOCUMENTED IN MEDICAL RECORD: ICD-10-PCS | Mod: CPTII,S$GLB,, | Performed by: SOCIAL WORKER

## 2023-03-14 PROCEDURE — 90785 PSYTX COMPLEX INTERACTIVE: CPT | Mod: S$GLB,,, | Performed by: SOCIAL WORKER

## 2023-03-14 PROCEDURE — 90837 PR PSYCHOTHERAPY W/PATIENT, 60 MIN: ICD-10-PCS | Mod: S$GLB,,, | Performed by: SOCIAL WORKER

## 2023-03-14 NOTE — PROGRESS NOTES
"Individual Psychotherapy (LCSW/PhD)  Isaias Collado,  3/14/2023    Site:  [facility]         Therapeutic Intervention: Met with patient for individual psychotherapy.    Chief complaint/reason for encounter: depression and anxiety     Interval history and content of current session: Pt reported she has made about $600. PT reported thrifting has kept her busy. She finalized her trip to [place] with her son to her daughter (in early September). PT reported her other daughter (30) is in [place]. Pt reported she is trying to focus on her weight, and she is excited for [place], which will be some motivation to lose weight. Pt reported something came up that was "kind of weird," she was watching a documentary and it brought up a memory of when she was pregnant with her 2nd daughter (Francisco Javier), but she did not know yet.  Pt reported Francisco Javier ended up having a large cyst and they had to drain it. While she was in the hospital she had a prank caller that reminded her of the documentary. PT reported this started her mindset of how am I going to get out, all of the "what if's." She began talking about her divorce with her 1st  and ending up in [place] on her own trying to provide for 2 kids. We discussed picking up there in our next session. Therapist booked pt out through July to prevent care gaps. She denies SI, no HI or AVH.     Treatment plan:  Target symptoms: depression, anxiety   Why chosen therapy is appropriate versus another modality: relevant to diagnosis, patient responds to this modality, evidence based practice  Outcome monitoring methods: self-report, observation, checklist/rating scale  Therapeutic intervention type: insight oriented psychotherapy, behavior modifying psychotherapy, supportive psychotherapy    Risk parameters:  Patient reports no suicidal ideation  Patient reports no homicidal ideation  Patient reports no self-injurious behavior  Patient reports no violent behavior    Verbal deficits: " None    Patient's response to intervention:  The patient's response to intervention is accepting, motivated.    Progress toward goals and other mental status changes:  The patient's progress toward goals is fair .    Diagnosis:     ICD-10-CM ICD-9-CM   1. Generalized anxiety disorder with panic attacks  F41.1 300.02    F41.0 300.01         Plan: Pt plans to continue individual psychotherapy    Return to clinic: as scheduled    Length of Service (minutes): 60

## 2023-03-22 ENCOUNTER — CLINICAL SUPPORT (OUTPATIENT)
Dept: SMOKING CESSATION | Facility: CLINIC | Age: 53
End: 2023-03-22
Payer: COMMERCIAL

## 2023-03-22 DIAGNOSIS — F17.200 NICOTINE DEPENDENCE: Primary | ICD-10-CM

## 2023-03-22 PROCEDURE — 99407 PR TOBACCO USE CESSATION INTENSIVE >10 MINUTES: ICD-10-PCS | Mod: S$GLB,,, | Performed by: FAMILY MEDICINE

## 2023-03-22 PROCEDURE — 99407 BEHAV CHNG SMOKING > 10 MIN: CPT | Mod: S$GLB,,, | Performed by: FAMILY MEDICINE

## 2023-03-22 PROCEDURE — 99999 PR PBB SHADOW E&M-EST. PATIENT-LVL I: CPT | Mod: PBBFAC,,,

## 2023-03-22 PROCEDURE — 99999 PR PBB SHADOW E&M-EST. PATIENT-LVL I: ICD-10-PCS | Mod: PBBFAC,,,

## 2023-03-22 NOTE — PROGRESS NOTES
Spoke with patient today in regard to smoking cessation progress, she states tobacco free. Commended patient on the accomplishment. Informed patient of benefit period and contact information if any further help or support is needed. Will resolve episode and complete smart form for Quit attempt #1.

## 2023-03-31 ENCOUNTER — PATIENT MESSAGE (OUTPATIENT)
Dept: PSYCHIATRY | Facility: CLINIC | Age: 53
End: 2023-03-31

## 2023-04-11 ENCOUNTER — PATIENT MESSAGE (OUTPATIENT)
Dept: PSYCHIATRY | Facility: CLINIC | Age: 53
End: 2023-04-11
Payer: COMMERCIAL

## 2023-04-26 ENCOUNTER — OFFICE VISIT (OUTPATIENT)
Dept: PSYCHIATRY | Facility: CLINIC | Age: 53
End: 2023-04-26
Payer: COMMERCIAL

## 2023-04-26 DIAGNOSIS — F41.1 GENERALIZED ANXIETY DISORDER WITH PANIC ATTACKS: Primary | ICD-10-CM

## 2023-04-26 DIAGNOSIS — F41.0 GENERALIZED ANXIETY DISORDER WITH PANIC ATTACKS: Primary | ICD-10-CM

## 2023-04-26 PROCEDURE — 90837 PR PSYCHOTHERAPY W/PATIENT, 60 MIN: ICD-10-PCS | Mod: S$GLB,,, | Performed by: SOCIAL WORKER

## 2023-04-26 PROCEDURE — 90837 PSYTX W PT 60 MINUTES: CPT | Mod: S$GLB,,, | Performed by: SOCIAL WORKER

## 2023-04-26 PROCEDURE — 1159F PR MEDICATION LIST DOCUMENTED IN MEDICAL RECORD: ICD-10-PCS | Mod: CPTII,S$GLB,, | Performed by: SOCIAL WORKER

## 2023-04-26 PROCEDURE — 1159F MED LIST DOCD IN RCRD: CPT | Mod: CPTII,S$GLB,, | Performed by: SOCIAL WORKER

## 2023-04-26 NOTE — PROGRESS NOTES
"Individual Psychotherapy (LCSW/PhD)  Isaias Collado,  4/26/2023    Site:  [facility]         Therapeutic Intervention: Met with patient for individual psychotherapy.    Chief complaint/reason for encounter: depression and anxiety     Interval history and content of current session: Pt reported she is going to [place] at the end of the May. She also has a trip to [place], [place] on May 19th. Then [place] at the of August. Her son's dad is trying to do his treatment. Pt reported she is dropping Danis off, at the end of [place]y. Pt reported she was living in [place], when she had Francisco Javier, and it was "an escape" to start over with her first . They then tried to started over again and ending up in [place] for an other job. PT reported she end up on her own trying to provide for 2 kids. Pt reported things got bad again with her  doing drugs. PT reported she slowly saved money to leave him, after being in a hotel for 6 months. Pt reported at one point, she found herself sleeping with men for money, approx 4 times. PT reported he caught her trying to leave and so she ended up driving to [place] with him, and dropped him off at his mom's and left him there. She reported her oldest was 4 (Armando) when they were living in [place]. Pt reported she does not know the aforementioned affected the girls. Pt reported she feels lik she did what she could to stay afloat. She denies SI, no HI or AVH.     Treatment plan:  Target symptoms: depression, anxiety   Why chosen therapy is appropriate versus another modality: relevant to diagnosis, patient responds to this modality, evidence based practice  Outcome monitoring methods: self-report, observation, checklist/rating scale  Therapeutic intervention type: insight oriented psychotherapy, behavior modifying psychotherapy, supportive psychotherapy    Risk parameters:  Patient reports no suicidal ideation  Patient reports no homicidal ideation  Patient reports no self-injurious behavior  Patient reports " no violent behavior    Verbal deficits: None    Patient's response to intervention:  The patient's response to intervention is accepting, motivated.    Progress toward goals and other mental status changes:  The patient's progress toward goals is fair .    Diagnosis:     ICD-10-CM ICD-9-CM   1. Generalized anxiety disorder with panic attacks  F41.1 300.02    F41.0 300.01           Plan: Pt plans to continue individual psychotherapy    Return to clinic: as scheduled    Length of Service (minutes): 60

## 2023-05-24 ENCOUNTER — OFFICE VISIT (OUTPATIENT)
Dept: PSYCHIATRY | Facility: CLINIC | Age: 53
End: 2023-05-24
Payer: COMMERCIAL

## 2023-05-24 DIAGNOSIS — F41.1 GENERALIZED ANXIETY DISORDER WITH PANIC ATTACKS: Primary | ICD-10-CM

## 2023-05-24 DIAGNOSIS — F41.0 GENERALIZED ANXIETY DISORDER WITH PANIC ATTACKS: Primary | ICD-10-CM

## 2023-05-24 PROCEDURE — 90837 PR PSYCHOTHERAPY W/PATIENT, 60 MIN: ICD-10-PCS | Mod: S$GLB,,, | Performed by: SOCIAL WORKER

## 2023-05-24 PROCEDURE — 1159F PR MEDICATION LIST DOCUMENTED IN MEDICAL RECORD: ICD-10-PCS | Mod: CPTII,S$GLB,, | Performed by: SOCIAL WORKER

## 2023-05-24 PROCEDURE — 1159F MED LIST DOCD IN RCRD: CPT | Mod: CPTII,S$GLB,, | Performed by: SOCIAL WORKER

## 2023-05-24 PROCEDURE — 90837 PSYTX W PT 60 MINUTES: CPT | Mod: S$GLB,,, | Performed by: SOCIAL WORKER

## 2023-05-24 NOTE — PROGRESS NOTES
Individual Psychotherapy (LCSW/PhD)  Isaias Collado,  5/24/2023    Site:  [facility]         Therapeutic Intervention: Met with patient for individual psychotherapy.    Chief complaint/reason for encounter: depression and anxiety     Interval history and content of current session: Pt reported she went to [place] for a work trip. PT reported it was really awkward at times, becuae thye were not welcoming. She leaves for [place] Friday. Then she goes to [place] at end of August. 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 PT rpeorted 2 days after our session, Armando recieves 2 messages, 1 from a friend, who said she was looking for her birth parents. PT reported Armando started talking to the girl. Pt reported they discovered that this was a child of pt's first  Mateo Simpson. PT reported it came to life that Mateo Simpson, had a one night stand 3 months after they . PT reported she is always waiting for these men to prove something. She reproted the she avoids getting out of relationships, because she waits for them to change. She reported she feels at times I am not good enough. She became tearful as she spoke about this. Therapist facilitated pt to expressed evidence to the contrary, which she was receptive to. She denies SI, no HI or AVH.     Treatment plan:  Target symptoms: depression, anxiety   Why chosen therapy is appropriate versus another modality: relevant to diagnosis, patient responds to this modality, evidence based practice  Outcome monitoring methods: self-report, observation, checklist/rating scale  Therapeutic intervention type: insight oriented psychotherapy, behavior modifying psychotherapy, supportive psychotherapy    Risk parameters:  Patient reports no suicidal ideation  Patient reports no homicidal ideation  Patient reports no self-injurious behavior  Patient reports no violent behavior    Verbal deficits: None    Patient's response to intervention:  The patient's response to intervention is  accepting, motivated.    Progress toward goals and other mental status changes:  The patient's progress toward goals is fair .    Diagnosis:     ICD-10-CM ICD-9-CM   1. Generalized anxiety disorder with panic attacks  F41.1 300.02    F41.0 300.01         Plan: Pt plans to continue individual psychotherapy    Return to clinic: as scheduled    Length of Service (minutes): 60

## 2023-05-31 ENCOUNTER — OFFICE VISIT (OUTPATIENT)
Dept: PSYCHIATRY | Facility: CLINIC | Age: 53
End: 2023-05-31
Payer: COMMERCIAL

## 2023-05-31 DIAGNOSIS — F41.1 GENERALIZED ANXIETY DISORDER WITH PANIC ATTACKS: Primary | ICD-10-CM

## 2023-05-31 DIAGNOSIS — F17.200 NICOTINE DEPENDENCE, UNCOMPLICATED, UNSPECIFIED NICOTINE PRODUCT TYPE: ICD-10-CM

## 2023-05-31 DIAGNOSIS — F40.00 AGORAPHOBIA: ICD-10-CM

## 2023-05-31 DIAGNOSIS — F41.0 GENERALIZED ANXIETY DISORDER WITH PANIC ATTACKS: Primary | ICD-10-CM

## 2023-05-31 PROCEDURE — 1159F MED LIST DOCD IN RCRD: CPT | Mod: CPTII,95,, | Performed by: PHYSICIAN ASSISTANT

## 2023-05-31 PROCEDURE — 99214 PR OFFICE/OUTPT VISIT, EST, LEVL IV, 30-39 MIN: ICD-10-PCS | Mod: 95,,, | Performed by: PHYSICIAN ASSISTANT

## 2023-05-31 PROCEDURE — 1159F PR MEDICATION LIST DOCUMENTED IN MEDICAL RECORD: ICD-10-PCS | Mod: CPTII,95,, | Performed by: PHYSICIAN ASSISTANT

## 2023-05-31 PROCEDURE — 1160F RVW MEDS BY RX/DR IN RCRD: CPT | Mod: CPTII,95,, | Performed by: PHYSICIAN ASSISTANT

## 2023-05-31 PROCEDURE — 99214 OFFICE O/P EST MOD 30 MIN: CPT | Mod: 95,,, | Performed by: PHYSICIAN ASSISTANT

## 2023-05-31 PROCEDURE — 1160F PR REVIEW ALL MEDS BY PRESCRIBER/CLIN PHARMACIST DOCUMENTED: ICD-10-PCS | Mod: CPTII,95,, | Performed by: PHYSICIAN ASSISTANT

## 2023-05-31 RX ORDER — VENLAFAXINE HYDROCHLORIDE 75 MG/1
75 CAPSULE, EXTENDED RELEASE ORAL DAILY
Qty: 90 CAPSULE | Refills: 1 | Status: SHIPPED | OUTPATIENT
Start: 2023-05-31 | End: 2024-02-06 | Stop reason: SDUPTHER

## 2023-05-31 RX ORDER — VENLAFAXINE HYDROCHLORIDE 37.5 MG/1
37.5 CAPSULE, EXTENDED RELEASE ORAL DAILY
Qty: 90 CAPSULE | Refills: 1 | Status: SHIPPED | OUTPATIENT
Start: 2023-05-31 | End: 2023-11-06 | Stop reason: DRUGHIGH

## 2023-05-31 RX ORDER — ALPRAZOLAM 0.25 MG/1
0.25 TABLET ORAL 2 TIMES DAILY PRN
Qty: 60 TABLET | Refills: 3 | Status: SHIPPED | OUTPATIENT
Start: 2023-05-31 | End: 2023-11-06 | Stop reason: SDUPTHER

## 2023-05-31 NOTE — PROGRESS NOTES
"The patient location is: [place]    Visit type: audiovisual      Each patient to whom he or she provides medical services by telemedicine is:  (1) informed of the relationship between the physician and patient and the respective role of any other health care provider with respect to management of the patient; and (2) notified that he or she may decline to receive medical services by telemedicine and may withdraw from such care at any time.    Notes:       Outpatient Psychiatry Follow-Up Visit (MD/LEANDRA)    5/31/2023    Clinical Status of Patient:  Outpatient (Ambulatory)    Chief Complaint:  Isaias Collado is a 53 y.o. female who presents today for follow-up of anxiety.  Met with patient.      Interval History and Content of Current Session 05/31/2023:    Pt reports today: "mood has been good". Pt stopped buspar due to concern it was causing weight gain and bloating which improved when she stopped.    Reports having increased anxiety at times and "feel flushed" in groups of large people such as [employer] game. States she has elevated anxiety often without a trigger about 5x per week. Pt requests increase in effexor.     Pt states has been much more active socially and traveling. Pt previously did not often leave her house much.    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    Patients mood is steady and euthymic, affect appears mood congruent. Linear and logical, friendly and cooperative, good eye contact.    Denies SI/HI/AVH. Pt reports sleeping well and normal appetite. Denies side effects of medications.    Pt reports taking medications as prescribed and behaving appropriately during interview today.      Prior visit:    Pt reports today: "doing very good, medicine working good for me."    "Having a lot less anxiety, my energy is great, im starting a new business"    Patients mood is euthymic, affect appears mood congruent. Linear and logical, friendly and cooperative, good eye contact.    Denies " SI/HI/AVH. Pt reports sleeping well and increased appetite. Reports recent weight gain. Discussed eating low carb diet and decreasing snacking throughout the day. Pt agreeable to plan    Pt reports taking medications as prescribed and behaving appropriately during interview today.      Review of Systems     Review of Systems   Constitutional:  Negative for fever.   HENT:  Negative for sore throat.    Eyes:  Negative for photophobia.   Respiratory:  Negative for cough.    Cardiovascular:  Negative for chest pain and palpitations.   Gastrointestinal:  Negative for abdominal pain.   Genitourinary:  Negative for dysuria.   Musculoskeletal:  Negative for myalgias.   Skin:  Negative for rash.   Neurological:  Negative for dizziness.   Endo/Heme/Allergies:  Does not bruise/bleed easily.   Psychiatric/Behavioral:  Negative for depression, hallucinations, substance abuse and suicidal ideas. The patient is nervous/anxious. The patient does not have insomnia.      Psychiatric Review Of Systems - Is patient experiencing or having changes in:  sleep: no  appetite: no  weight: no  energy/anergy: no  interest/pleasure/anhedonia: no  somatic symptoms: no  libido: no  anxiety/panic: yes  guilty/hopelessness: no  concentration: no  S.I.B.s/risky behavior: no  Irritability: no  Racing thoughts: no  Impulsive behaviors: no  Paranoia: no  AVH: no      Past Medical, Family and Social History: The patient's past medical, family and social history have been reviewed and updated as appropriate within the electronic medical record - see encounter notes.      Current Medications:   Medication List with Changes/Refills   Current Medications    ALPRAZOLAM (XANAX) 0.25 MG TABLET    Take 1 tablet (0.25 mg total) by mouth 2 (two) times daily as needed for Anxiety.    BUSPIRONE (BUSPAR) 10 MG TABLET    Take 1 tablet (10 mg total) by mouth 2 (two) times daily.    CLOBETASOL (TEMOVATE) 0.05 % CREAM    Apply 1 application topically 2 (two) times  daily.    DOXYCYCLINE (VIBRAMYCIN) 50 MG CAPSULE        ESTRADIOL (ESTRACE) 0.5 MG TABLET    Take 0.5 mg by mouth once daily.    HYDROCHLOROTHIAZIDE (HYDRODIURIL) 25 MG TABLET    Take 25 mg by mouth once daily.    IBUPROFEN (ADVIL,MOTRIN) 800 MG TABLET    Take 1 tablet (800 mg total) by mouth 3 (three) times daily.    LACTOBAC NO.41/BIFIDOBACT NO.7 (PROBIOTIC-10 ORAL)    Take by mouth.    OXYBUTYNIN (DITROPAN) 5 MG TAB    Take 5 mg by mouth 3 (three) times daily.    TRIAMCINOLONE ACETONIDE 0.5% (KENALOG) 0.5 % CREA        VENLAFAXINE (EFFEXOR-XR) 75 MG 24 HR CAPSULE    Take 1 capsule (75 mg total) by mouth once daily.         Allergies:   Review of patient's allergies indicates:   Allergen Reactions    Lisinopril Other (See Comments)     Cough.    Erythromycin Other (See Comments)     Nausea and cold sweats    Morphine Itching    Adhesive Rash     Paper tape ok         Vitals   There were no vitals filed for this visit.         Labs/Imaging/Studies:   No results found for this or any previous visit (from the past 48 hour(s)).   No results found for: PHENYTOIN, PHENOBARB, VALPROATE, CBMZ    Compliance: yes    Side effects: orgasmic dysfunction    Risk Parameters:  Patient reports no suicidal ideation  Patient reports no homicidal ideation  Patient reports no self-injurious behavior  Patient reports no violent behavior    Exam (detailed: at least 9 elements; comprehensive: all 15 elements)   Constitutional  Vitals:  Most recent vital signs, dated less than 90 days prior to this appointment, were reviewed.   There were no vitals filed for this visit.       General:  unremarkable, age appropriate     Musculoskeletal  Muscle Strength/Tone:  not examined   Gait & Station:  non-ataxic     Psychiatric  Speech:  no latency; no press   Mood & Affect:  steady, euthymic  congruent and appropriate   Thought Process:  normal and logical   Associations:  intact   Thought Content:  normal, no suicidality, no homicidality,  delusions, or paranoia   Insight:  intact, has awareness of illness   Judgement: behavior is adequate to circumstances   Orientation:  grossly intact   Memory: intact for content of interview   Language: grossly intact   Attention Span & Concentration:  able to focus   Fund of Knowledge:  intact and appropriate to age and level of education     Assessment and Diagnosis   Status/Progress: Based on the examination today, the patient's problem(s) is/are improved and well controlled.  New problems have not been presented today.   Co-morbidities, Diagnostic uncertainty and Lack of compliance are not complicating management of the primary condition.  There are no active rule-out diagnoses for this patient at this time.     General Impression:      ICD-10-CM ICD-9-CM   1. Generalized anxiety disorder with panic attacks  F41.1 300.02    F41.0 300.01   2. Agoraphobia  F40.00 300.22   3. Nicotine dependence, uncomplicated, unspecified nicotine product type  F17.200 305.1             Intervention/Counseling/Treatment Plan   Medication Management: Continue current medications. The risks and benefits of medication were discussed with the patient.    -increase effexor to 112.5mg daily    -pt discontinued buspar due to side effects since last visit    -continue xanax 0.25mg bid prn panic attacks       Return to Clinic: 4 months        Scott Silva PA-C      Total face to face time: 21 min  Total time (chart review, patient contact, documentation): 29 min      *This note has been prepared using a combination of a dictation device and typing.  It has been checked for errors but some errors may still exist within the note as a result of speech recognition errors and/or typographical errors.

## 2023-06-19 ENCOUNTER — OFFICE VISIT (OUTPATIENT)
Dept: PSYCHIATRY | Facility: CLINIC | Age: 53
End: 2023-06-19
Payer: COMMERCIAL

## 2023-06-19 DIAGNOSIS — F41.0 GENERALIZED ANXIETY DISORDER WITH PANIC ATTACKS: Primary | ICD-10-CM

## 2023-06-19 DIAGNOSIS — F41.1 GENERALIZED ANXIETY DISORDER WITH PANIC ATTACKS: Primary | ICD-10-CM

## 2023-06-19 PROCEDURE — 90837 PR PSYCHOTHERAPY W/PATIENT, 60 MIN: ICD-10-PCS | Mod: S$GLB,,, | Performed by: SOCIAL WORKER

## 2023-06-19 PROCEDURE — 1159F MED LIST DOCD IN RCRD: CPT | Mod: CPTII,S$GLB,, | Performed by: SOCIAL WORKER

## 2023-06-19 PROCEDURE — 90837 PSYTX W PT 60 MINUTES: CPT | Mod: S$GLB,,, | Performed by: SOCIAL WORKER

## 2023-06-19 PROCEDURE — 1159F PR MEDICATION LIST DOCUMENTED IN MEDICAL RECORD: ICD-10-PCS | Mod: CPTII,S$GLB,, | Performed by: SOCIAL WORKER

## 2023-06-19 PROCEDURE — 90785 PSYTX COMPLEX INTERACTIVE: CPT | Mod: S$GLB,,, | Performed by: SOCIAL WORKER

## 2023-06-19 PROCEDURE — 90785 PR INTERACTIVE COMPLEXITY: ICD-10-PCS | Mod: S$GLB,,, | Performed by: SOCIAL WORKER

## 2023-06-19 NOTE — PROGRESS NOTES
Individual Psychotherapy (LCSW/PhD)  Isaias Collado,  6/19/2023    Site:  [facility]         Therapeutic Intervention: Met with patient for individual psychotherapy.    Chief complaint/reason for encounter: depression and anxiety     Interval history and content of current session: Pt processed the birth of her first child. PT reported her  was in long-term for 2 years for running over a man with a deactivated license. She knows that Calvin' mom had psychosis, but did function as a nurse until long term. She reported she finally dumped Calvin after she left [place] and left him at his mom's house. PT reported he would show up from here or there. She reported even days before his death, pt was talking to him, and offered to take him after Melissa. She reported she met Arnoldo 2 years later. Pt reported they did find out the sister was her daughter's biological sister. Pt reported this has brought up anger for her. We processed this anger, and therapist normalized it. Therapist booked pt out for October to prevent care gaps. She denies SI, no HI or AVH.     Treatment plan:  Target symptoms: depression, anxiety   Why chosen therapy is appropriate versus another modality: relevant to diagnosis, patient responds to this modality, evidence based practice  Outcome monitoring methods: self-report, observation, checklist/rating scale  Therapeutic intervention type: insight oriented psychotherapy, behavior modifying psychotherapy, supportive psychotherapy    Risk parameters:  Patient reports no suicidal ideation  Patient reports no homicidal ideation  Patient reports no self-injurious behavior  Patient reports no violent behavior    Verbal deficits: None    Patient's response to intervention:  The patient's response to intervention is accepting, motivated.    Progress toward goals and other mental status changes:  The patient's progress toward goals is fair .    Diagnosis:     ICD-10-CM ICD-9-CM   1. Generalized anxiety disorder  with panic attacks  F41.1 300.02    F41.0 300.01           Plan: Pt plans to continue individual psychotherapy    Return to clinic: as scheduled    Length of Service (minutes): 60

## 2023-07-12 ENCOUNTER — OFFICE VISIT (OUTPATIENT)
Dept: PSYCHIATRY | Facility: CLINIC | Age: 53
End: 2023-07-12
Payer: COMMERCIAL

## 2023-07-12 DIAGNOSIS — F41.0 GENERALIZED ANXIETY DISORDER WITH PANIC ATTACKS: Primary | ICD-10-CM

## 2023-07-12 DIAGNOSIS — F41.1 GENERALIZED ANXIETY DISORDER WITH PANIC ATTACKS: Primary | ICD-10-CM

## 2023-07-12 DIAGNOSIS — F33.1 MODERATE EPISODE OF RECURRENT MAJOR DEPRESSIVE DISORDER: ICD-10-CM

## 2023-07-12 PROCEDURE — 1159F MED LIST DOCD IN RCRD: CPT | Mod: CPTII,S$GLB,, | Performed by: SOCIAL WORKER

## 2023-07-12 PROCEDURE — 90837 PSYTX W PT 60 MINUTES: CPT | Mod: S$GLB,,, | Performed by: SOCIAL WORKER

## 2023-07-12 PROCEDURE — 90785 PR INTERACTIVE COMPLEXITY: ICD-10-PCS | Mod: S$GLB,,, | Performed by: SOCIAL WORKER

## 2023-07-12 PROCEDURE — 1159F PR MEDICATION LIST DOCUMENTED IN MEDICAL RECORD: ICD-10-PCS | Mod: CPTII,S$GLB,, | Performed by: SOCIAL WORKER

## 2023-07-12 PROCEDURE — 90785 PSYTX COMPLEX INTERACTIVE: CPT | Mod: S$GLB,,, | Performed by: SOCIAL WORKER

## 2023-07-12 PROCEDURE — 90837 PR PSYCHOTHERAPY W/PATIENT, 60 MIN: ICD-10-PCS | Mod: S$GLB,,, | Performed by: SOCIAL WORKER

## 2023-07-12 NOTE — PROGRESS NOTES
Individual Psychotherapy (LCSW/PhD)  Isaias Collado,  7/12/2023    Site:  [facility]         Therapeutic Intervention: Met with patient for individual psychotherapy.    Chief complaint/reason for encounter: depression and anxiety     Interval history and content of current session: Pt reported the girls went to see their half-sister. Since taking about this person, pt has started skin picking agin. PT reported she did want details of this mom's relationship. Pt reported she has been more depressed, since Danis has been with his dad. She reported Danis hopefully comes back next week. She was tearful throughout the session. We discussed reframing her thoughts, focusing on the time she has had with the girls and the way she sacrificed for them. PT was agreeable. Therapist also booked her November appts to prevent care gaps. She denies SI, no HI or AVH.     Treatment plan:  Target symptoms: depression, anxiety   Why chosen therapy is appropriate versus another modality: relevant to diagnosis, patient responds to this modality, evidence based practice  Outcome monitoring methods: self-report, observation, checklist/rating scale  Therapeutic intervention type: insight oriented psychotherapy, behavior modifying psychotherapy, supportive psychotherapy    Risk parameters:  Patient reports no suicidal ideation  Patient reports no homicidal ideation  Patient reports no self-injurious behavior  Patient reports no violent behavior    Verbal deficits: None    Patient's response to intervention:  The patient's response to intervention is accepting, motivated.    Progress toward goals and other mental status changes:  The patient's progress toward goals is fair .    Diagnosis:     ICD-10-CM ICD-9-CM   1. Generalized anxiety disorder with panic attacks  F41.1 300.02    F41.0 300.01   2. Moderate episode of recurrent major depressive disorder  F33.1 296.32       Plan: Pt plans to continue individual psychotherapy    Return to  clinic: as scheduled    Length of Service (minutes): 60

## 2023-07-15 PROBLEM — F33.1 MODERATE EPISODE OF RECURRENT MAJOR DEPRESSIVE DISORDER: Status: ACTIVE | Noted: 2023-07-15

## 2023-07-27 ENCOUNTER — OFFICE VISIT (OUTPATIENT)
Dept: PSYCHIATRY | Facility: CLINIC | Age: 53
End: 2023-07-27
Payer: COMMERCIAL

## 2023-07-27 VITALS
BODY MASS INDEX: 32.27 KG/M2 | WEIGHT: 199.94 LBS | DIASTOLIC BLOOD PRESSURE: 83 MMHG | SYSTOLIC BLOOD PRESSURE: 133 MMHG | HEART RATE: 97 BPM

## 2023-07-27 DIAGNOSIS — Z79.899 ON LONG TERM DRUG THERAPY: ICD-10-CM

## 2023-07-27 DIAGNOSIS — F17.200 NICOTINE DEPENDENCE, UNCOMPLICATED, UNSPECIFIED NICOTINE PRODUCT TYPE: ICD-10-CM

## 2023-07-27 DIAGNOSIS — F40.00 AGORAPHOBIA: ICD-10-CM

## 2023-07-27 DIAGNOSIS — F33.1 MODERATE EPISODE OF RECURRENT MAJOR DEPRESSIVE DISORDER: ICD-10-CM

## 2023-07-27 DIAGNOSIS — F41.1 GENERALIZED ANXIETY DISORDER WITH PANIC ATTACKS: Primary | ICD-10-CM

## 2023-07-27 DIAGNOSIS — R63.5 WEIGHT GAIN, ABNORMAL: ICD-10-CM

## 2023-07-27 DIAGNOSIS — R73.03 PREDIABETES: ICD-10-CM

## 2023-07-27 DIAGNOSIS — F41.0 GENERALIZED ANXIETY DISORDER WITH PANIC ATTACKS: Primary | ICD-10-CM

## 2023-07-27 PROCEDURE — 1159F MED LIST DOCD IN RCRD: CPT | Mod: CPTII,S$GLB,, | Performed by: PHYSICIAN ASSISTANT

## 2023-07-27 PROCEDURE — 99214 OFFICE O/P EST MOD 30 MIN: CPT | Mod: S$GLB,,, | Performed by: PHYSICIAN ASSISTANT

## 2023-07-27 PROCEDURE — 3079F PR MOST RECENT DIASTOLIC BLOOD PRESSURE 80-89 MM HG: ICD-10-PCS | Mod: CPTII,S$GLB,, | Performed by: PHYSICIAN ASSISTANT

## 2023-07-27 PROCEDURE — 99214 PR OFFICE/OUTPT VISIT, EST, LEVL IV, 30-39 MIN: ICD-10-PCS | Mod: S$GLB,,, | Performed by: PHYSICIAN ASSISTANT

## 2023-07-27 PROCEDURE — 99999 PR PBB SHADOW E&M-EST. PATIENT-LVL III: CPT | Mod: PBBFAC,,, | Performed by: PHYSICIAN ASSISTANT

## 2023-07-27 PROCEDURE — 3075F PR MOST RECENT SYSTOLIC BLOOD PRESS GE 130-139MM HG: ICD-10-PCS | Mod: CPTII,S$GLB,, | Performed by: PHYSICIAN ASSISTANT

## 2023-07-27 PROCEDURE — 3008F BODY MASS INDEX DOCD: CPT | Mod: CPTII,S$GLB,, | Performed by: PHYSICIAN ASSISTANT

## 2023-07-27 PROCEDURE — 3075F SYST BP GE 130 - 139MM HG: CPT | Mod: CPTII,S$GLB,, | Performed by: PHYSICIAN ASSISTANT

## 2023-07-27 PROCEDURE — 1159F PR MEDICATION LIST DOCUMENTED IN MEDICAL RECORD: ICD-10-PCS | Mod: CPTII,S$GLB,, | Performed by: PHYSICIAN ASSISTANT

## 2023-07-27 PROCEDURE — 1160F PR REVIEW ALL MEDS BY PRESCRIBER/CLIN PHARMACIST DOCUMENTED: ICD-10-PCS | Mod: CPTII,S$GLB,, | Performed by: PHYSICIAN ASSISTANT

## 2023-07-27 PROCEDURE — 3079F DIAST BP 80-89 MM HG: CPT | Mod: CPTII,S$GLB,, | Performed by: PHYSICIAN ASSISTANT

## 2023-07-27 PROCEDURE — 3008F PR BODY MASS INDEX (BMI) DOCUMENTED: ICD-10-PCS | Mod: CPTII,S$GLB,, | Performed by: PHYSICIAN ASSISTANT

## 2023-07-27 PROCEDURE — 99999 PR PBB SHADOW E&M-EST. PATIENT-LVL III: ICD-10-PCS | Mod: PBBFAC,,, | Performed by: PHYSICIAN ASSISTANT

## 2023-07-27 PROCEDURE — 1160F RVW MEDS BY RX/DR IN RCRD: CPT | Mod: CPTII,S$GLB,, | Performed by: PHYSICIAN ASSISTANT

## 2023-07-27 PROCEDURE — 90833 PR PSYCHOTHERAPY W/PATIENT W/E&M, 30 MIN (ADD ON): ICD-10-PCS | Mod: S$GLB,,, | Performed by: PHYSICIAN ASSISTANT

## 2023-07-27 PROCEDURE — 90833 PSYTX W PT W E/M 30 MIN: CPT | Mod: S$GLB,,, | Performed by: PHYSICIAN ASSISTANT

## 2023-07-27 RX ORDER — METFORMIN HYDROCHLORIDE 500 MG/1
500 TABLET ORAL 2 TIMES DAILY WITH MEALS
Qty: 60 TABLET | Refills: 2 | Status: SHIPPED | OUTPATIENT
Start: 2023-07-27 | End: 2023-10-25

## 2023-07-27 NOTE — PROGRESS NOTES
"Outpatient Psychiatry Follow-Up Visit (MD/LEANDRA)    7/27/2023    Clinical Status of Patient:  Outpatient (Ambulatory)    Chief Complaint:  Isaias Collado is a 53 y.o. female who presents today for follow-up of anxiety.  Met with patient.      Interval History and Content of Current Session 07/27/2023:    Pt reports today: "mood has been ok, concerned about amount of weight I am gaining. Pt currently at 200lb. Hx PCOS but no longer on metformin    Reports anxiety better controlled with increase in effexor at last visit.    Pt concerned may be related to medications. Discussed with pt need labwork done to eliminate other possibilities    Patients mood is steady, affect appears mood congruent. Linear and logical, friendly and cooperative, good eye contact.    Denies SI/HI/AVH. Pt reports sleeping well and normal appetite. Reports constipation from effexor, recommending miralax daily until normalized bowel movemetns.    Pt reports taking medications as prescribed and behaving appropriately during interview today.    Psychotherapy:  Target symptoms: depression, anxiety   Why chosen therapy is appropriate versus another modality: relevant to diagnosis, patient responds to this modality, evidence based practice  Outcome monitoring methods: self-report, observation  Therapeutic intervention type: insight oriented psychotherapy, supportive psychotherapy  Topics discussed/themes: building skills sets for symptom management, symptom recognition  The patient's response to the intervention is accepting. The patient's progress toward treatment goals is good.   Duration of intervention: 17 minutes.      Prior visit:    Pt reports today: "mood has been good". Pt stopped buspar due to concern it was causing weight gain and bloating which improved when she stopped.    Reports having increased anxiety at times and "feel flushed" in groups of large people such as [employer] game. States she has elevated anxiety often without a trigger about 5x per " week. Pt requests increase in effexor.     Pt states has been much more active socially and traveling. Pt previously did not often leave her house much.    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    Patients mood is steady and euthymic, affect appears mood congruent. Linear and logical, friendly and cooperative, good eye contact.    Denies SI/HI/AVH. Pt reports sleeping well and normal appetite. Denies side effects of medications.    Pt reports taking medications as prescribed and behaving appropriately during interview today.      Review of Systems     Review of Systems   Constitutional:  Negative for fever.   HENT:  Negative for sore throat.    Eyes:  Negative for photophobia.   Respiratory:  Negative for cough.    Cardiovascular:  Negative for chest pain and palpitations.   Gastrointestinal:  Positive for constipation. Negative for abdominal pain, nausea and vomiting.   Genitourinary:  Negative for dysuria.   Musculoskeletal:  Negative for myalgias.   Skin:  Negative for rash.   Neurological:  Negative for dizziness.   Endo/Heme/Allergies:  Does not bruise/bleed easily.   Psychiatric/Behavioral:  Positive for depression. Negative for hallucinations, substance abuse and suicidal ideas. The patient is nervous/anxious. The patient does not have insomnia.      Psychiatric Review Of Systems - Is patient experiencing or having changes in:  sleep: no  appetite: no  weight: yes  energy/anergy: no  interest/pleasure/anhedonia: no  somatic symptoms: no  libido: no  anxiety/panic: yes  guilty/hopelessness: no  concentration: no  S.I.B.s/risky behavior: no  Irritability: no  Racing thoughts: no  Impulsive behaviors: no  Paranoia: no  AVH: no      Past Medical, Family and Social History: The patient's past medical, family and social history have been reviewed and updated as appropriate within the electronic medical record - see encounter notes.      Current Medications:   Medication List with  Changes/Refills   New Medications    METFORMIN (GLUCOPHAGE) 500 MG TABLET    Take 1 tablet (500 mg total) by mouth 2 (two) times daily with meals.   Current Medications    ALPRAZOLAM (XANAX) 0.25 MG TABLET    Take 1 tablet (0.25 mg total) by mouth 2 (two) times daily as needed for Anxiety.    CLOBETASOL (TEMOVATE) 0.05 % CREAM    Apply 1 application topically 2 (two) times daily.    DOXYCYCLINE (VIBRAMYCIN) 50 MG CAPSULE        ESTRADIOL (ESTRACE) 0.5 MG TABLET    Take 0.5 mg by mouth once daily.    HYDROCHLOROTHIAZIDE (HYDRODIURIL) 25 MG TABLET    Take 25 mg by mouth once daily.    IBUPROFEN (ADVIL,MOTRIN) 800 MG TABLET    Take 1 tablet (800 mg total) by mouth 3 (three) times daily.    LACTOBAC NO.41/BIFIDOBACT NO.7 (PROBIOTIC-10 ORAL)    Take by mouth.    OXYBUTYNIN (DITROPAN) 5 MG TAB    Take 5 mg by mouth 3 (three) times daily.    TRIAMCINOLONE ACETONIDE 0.5% (KENALOG) 0.5 % CREA        VENLAFAXINE (EFFEXOR-XR) 37.5 MG 24 HR CAPSULE    Take 1 capsule (37.5 mg total) by mouth once daily. Take with 75mg capsule for total of 112.5mg daily    VENLAFAXINE (EFFEXOR-XR) 75 MG 24 HR CAPSULE    Take 1 capsule (75 mg total) by mouth once daily. Take with 37.5mg capsule for total of 112.5mg daily   Discontinued Medications    BUSPIRONE (BUSPAR) 10 MG TABLET    Take 1 tablet (10 mg total) by mouth 2 (two) times daily.         Allergies:   Review of patient's allergies indicates:   Allergen Reactions    Lisinopril Other (See Comments)     Cough.    Erythromycin Other (See Comments)     Nausea and cold sweats    Morphine Itching    Adhesive Rash     Paper tape ok         Vitals   Vitals:    07/27/23 1435   BP: 133/83   Pulse: 97            Labs/Imaging/Studies:   No results found for this or any previous visit (from the past 48 hour(s)).   No results found for: PHENYTOIN, PHENOBARB, VALPROATE, CBMZ    Compliance: yes    Side effects: orgasmic dysfunction    Risk Parameters:  Patient reports no suicidal ideation  Patient  reports no homicidal ideation  Patient reports no self-injurious behavior  Patient reports no violent behavior    Exam (detailed: at least 9 elements; comprehensive: all 15 elements)   Constitutional  Vitals:  Most recent vital signs, dated less than 90 days prior to this appointment, were reviewed.   Vitals:    07/27/23 1435   BP: 133/83   Pulse: 97   Weight: 90.7 kg (199 lb 15.3 oz)          General:  unremarkable, age appropriate     Musculoskeletal  Muscle Strength/Tone:  not examined   Gait & Station:  non-ataxic     Psychiatric  Speech:  no latency; no press   Mood & Affect:  steady  congruent and appropriate   Thought Process:  normal and logical   Associations:  intact   Thought Content:  normal, no suicidality, no homicidality, delusions, or paranoia   Insight:  intact, has awareness of illness   Judgement: behavior is adequate to circumstances   Orientation:  grossly intact   Memory: intact for content of interview   Language: grossly intact   Attention Span & Concentration:  able to focus   Fund of Knowledge:  intact and appropriate to age and level of education     Assessment and Diagnosis   Status/Progress: Based on the examination today, the patient's problem(s) is/are adequately but not ideally controlled.  New problems have not been presented today.   Co-morbidities, Diagnostic uncertainty and Lack of compliance are not complicating management of the primary condition.  There are no active rule-out diagnoses for this patient at this time.     General Impression:      ICD-10-CM ICD-9-CM   1. Generalized anxiety disorder with panic attacks  F41.1 300.02    F41.0 300.01   2. Moderate episode of recurrent major depressive disorder  F33.1 296.32   3. Agoraphobia  F40.00 300.22   4. Nicotine dependence, uncomplicated, unspecified nicotine product type  F17.200 305.1   5. Prediabetes  R73.03 790.29   6. On long term drug therapy  Z79.899 V58.69   7. Weight gain, abnormal  R63.5 783.1                Intervention/Counseling/Treatment Plan   Medication Management: Continue current medications. The risks and benefits of medication were discussed with the patient.  Labs, Diagnostic Studies: order cbc, cmp, a1c, lipids, tsh    -continue effexor to 112.5mg daily    -continue xanax 0.25mg bid prn panic attacks       Return to Clinic: 1 month        Scott Silva PA-C      Total face to face time: 35 min  Total time (chart review, patient contact, documentation): 40 min      *This note has been prepared using a combination of a dictation device and typing.  It has been checked for errors but some errors may still exist within the note as a result of speech recognition errors and/or typographical errors.

## 2023-09-20 ENCOUNTER — LAB VISIT (OUTPATIENT)
Dept: LAB | Facility: HOSPITAL | Age: 53
End: 2023-09-20
Payer: COMMERCIAL

## 2023-09-20 DIAGNOSIS — R63.5 WEIGHT GAIN, ABNORMAL: ICD-10-CM

## 2023-09-20 DIAGNOSIS — R73.03 PREDIABETES: ICD-10-CM

## 2023-09-20 DIAGNOSIS — Z79.899 ON LONG TERM DRUG THERAPY: ICD-10-CM

## 2023-09-20 LAB
ALBUMIN SERPL BCP-MCNC: 3.9 G/DL (ref 3.5–5.2)
ALP SERPL-CCNC: 83 U/L (ref 55–135)
ALT SERPL W/O P-5'-P-CCNC: 19 U/L (ref 10–44)
ANION GAP SERPL CALC-SCNC: 7 MMOL/L (ref 8–16)
AST SERPL-CCNC: 20 U/L (ref 10–40)
BASOPHILS # BLD AUTO: 0.06 K/UL (ref 0–0.2)
BASOPHILS NFR BLD: 0.9 % (ref 0–1.9)
BILIRUB SERPL-MCNC: 0.4 MG/DL (ref 0.1–1)
BUN SERPL-MCNC: 11 MG/DL (ref 6–20)
CALCIUM SERPL-MCNC: 9.2 MG/DL (ref 8.7–10.5)
CHLORIDE SERPL-SCNC: 102 MMOL/L (ref 95–110)
CHOLEST SERPL-MCNC: 302 MG/DL (ref 120–199)
CHOLEST/HDLC SERPL: 4.5 {RATIO} (ref 2–5)
CO2 SERPL-SCNC: 31 MMOL/L (ref 23–29)
CREAT SERPL-MCNC: 0.8 MG/DL (ref 0.5–1.4)
DIFFERENTIAL METHOD: ABNORMAL
EOSINOPHIL # BLD AUTO: 0.2 K/UL (ref 0–0.5)
EOSINOPHIL NFR BLD: 2.2 % (ref 0–8)
ERYTHROCYTE [DISTWIDTH] IN BLOOD BY AUTOMATED COUNT: 14.2 % (ref 11.5–14.5)
EST. GFR  (NO RACE VARIABLE): >60 ML/MIN/1.73 M^2
ESTIMATED AVG GLUCOSE: 117 MG/DL (ref 68–131)
GLUCOSE SERPL-MCNC: 87 MG/DL (ref 70–110)
HBA1C MFR BLD: 5.7 % (ref 4–5.6)
HCT VFR BLD AUTO: 40.3 % (ref 37–48.5)
HDLC SERPL-MCNC: 67 MG/DL (ref 40–75)
HDLC SERPL: 22.2 % (ref 20–50)
HGB BLD-MCNC: 12.5 G/DL (ref 12–16)
IMM GRANULOCYTES # BLD AUTO: 0.02 K/UL (ref 0–0.04)
IMM GRANULOCYTES NFR BLD AUTO: 0.3 % (ref 0–0.5)
LDLC SERPL CALC-MCNC: 195 MG/DL (ref 63–159)
LYMPHOCYTES # BLD AUTO: 2.3 K/UL (ref 1–4.8)
LYMPHOCYTES NFR BLD: 34.7 % (ref 18–48)
MCH RBC QN AUTO: 28.2 PG (ref 27–31)
MCHC RBC AUTO-ENTMCNC: 31 G/DL (ref 32–36)
MCV RBC AUTO: 91 FL (ref 82–98)
MONOCYTES # BLD AUTO: 0.4 K/UL (ref 0.3–1)
MONOCYTES NFR BLD: 6.6 % (ref 4–15)
NEUTROPHILS # BLD AUTO: 3.7 K/UL (ref 1.8–7.7)
NEUTROPHILS NFR BLD: 55.3 % (ref 38–73)
NONHDLC SERPL-MCNC: 235 MG/DL
NRBC BLD-RTO: 0 /100 WBC
PLATELET # BLD AUTO: 259 K/UL (ref 150–450)
PMV BLD AUTO: 10.5 FL (ref 9.2–12.9)
POTASSIUM SERPL-SCNC: 3.5 MMOL/L (ref 3.5–5.1)
PROT SERPL-MCNC: 7.3 G/DL (ref 6–8.4)
RBC # BLD AUTO: 4.44 M/UL (ref 4–5.4)
SODIUM SERPL-SCNC: 140 MMOL/L (ref 136–145)
TRIGL SERPL-MCNC: 200 MG/DL (ref 30–150)
TSH SERPL DL<=0.005 MIU/L-ACNC: 1.46 UIU/ML (ref 0.4–4)
WBC # BLD AUTO: 6.68 K/UL (ref 3.9–12.7)

## 2023-09-20 PROCEDURE — 85025 COMPLETE CBC W/AUTO DIFF WBC: CPT | Performed by: PHYSICIAN ASSISTANT

## 2023-09-20 PROCEDURE — 36415 COLL VENOUS BLD VENIPUNCTURE: CPT | Performed by: PHYSICIAN ASSISTANT

## 2023-09-20 PROCEDURE — 80053 COMPREHEN METABOLIC PANEL: CPT | Performed by: PHYSICIAN ASSISTANT

## 2023-09-20 PROCEDURE — 80061 LIPID PANEL: CPT | Performed by: PHYSICIAN ASSISTANT

## 2023-09-20 PROCEDURE — 83036 HEMOGLOBIN GLYCOSYLATED A1C: CPT | Performed by: PHYSICIAN ASSISTANT

## 2023-09-20 PROCEDURE — 84443 ASSAY THYROID STIM HORMONE: CPT | Performed by: PHYSICIAN ASSISTANT

## 2023-10-16 ENCOUNTER — PATIENT MESSAGE (OUTPATIENT)
Dept: PSYCHIATRY | Facility: CLINIC | Age: 53
End: 2023-10-16
Payer: COMMERCIAL

## 2023-10-18 ENCOUNTER — OFFICE VISIT (OUTPATIENT)
Dept: PSYCHIATRY | Facility: CLINIC | Age: 53
End: 2023-10-18
Payer: COMMERCIAL

## 2023-10-18 DIAGNOSIS — F40.00 AGORAPHOBIA: ICD-10-CM

## 2023-10-18 DIAGNOSIS — F41.0 GENERALIZED ANXIETY DISORDER WITH PANIC ATTACKS: Primary | ICD-10-CM

## 2023-10-18 DIAGNOSIS — F41.1 GENERALIZED ANXIETY DISORDER WITH PANIC ATTACKS: Primary | ICD-10-CM

## 2023-10-18 PROCEDURE — 1159F MED LIST DOCD IN RCRD: CPT | Mod: CPTII,S$GLB,, | Performed by: SOCIAL WORKER

## 2023-10-18 PROCEDURE — 90834 PR PSYCHOTHERAPY W/PATIENT, 45 MIN: ICD-10-PCS | Mod: S$GLB,,, | Performed by: SOCIAL WORKER

## 2023-10-18 PROCEDURE — 90834 PSYTX W PT 45 MINUTES: CPT | Mod: S$GLB,,, | Performed by: SOCIAL WORKER

## 2023-10-18 PROCEDURE — 1159F PR MEDICATION LIST DOCUMENTED IN MEDICAL RECORD: ICD-10-PCS | Mod: CPTII,S$GLB,, | Performed by: SOCIAL WORKER

## 2023-10-18 PROCEDURE — 3044F HG A1C LEVEL LT 7.0%: CPT | Mod: CPTII,S$GLB,, | Performed by: SOCIAL WORKER

## 2023-10-18 PROCEDURE — 3044F PR MOST RECENT HEMOGLOBIN A1C LEVEL <7.0%: ICD-10-PCS | Mod: CPTII,S$GLB,, | Performed by: SOCIAL WORKER

## 2023-10-18 NOTE — PROGRESS NOTES
"Individual Psychotherapy (LCSW/PhD)  Isaias Collado,  10/18/2023    Site:  [facility]         Therapeutic Intervention: Met with patient for individual psychotherapy.    Chief complaint/reason for encounter: depression and anxiety     Interval history and content of current session: Pt reported she went to [place] and she had a great time. She did some para-sailing. Pt reported next year her son is going to do his [employer] year in [place]. Pt reported she feels like he needs some independence from pt and that it will be good for the both of them. She reported observing how "clingy" he is with her and she does not believe this is healthy. She reported she is thinking about [facility]. She reported this would allow her to save money. PT reported that is his 10 year plan. She shared that her  is cancer free and seems to be doing better. She denies SI, no HI or AVH.     Treatment plan:  Target symptoms: depression, anxiety   Why chosen therapy is appropriate versus another modality: relevant to diagnosis, patient responds to this modality, evidence based practice  Outcome monitoring methods: self-report, observation, checklist/rating scale  Therapeutic intervention type: insight oriented psychotherapy, behavior modifying psychotherapy, supportive psychotherapy    Risk parameters:  Patient reports no suicidal ideation  Patient reports no homicidal ideation  Patient reports no self-injurious behavior  Patient reports no violent behavior    Verbal deficits: None    Patient's response to intervention:  The patient's response to intervention is accepting, motivated.    Progress toward goals and other mental status changes:  The patient's progress toward goals is fair .    Diagnosis:     ICD-10-CM ICD-9-CM   1. Generalized anxiety disorder with panic attacks  F41.1 300.02    F41.0 300.01   2. Agoraphobia  F40.00 300.22         Plan: Pt plans to continue individual " psychotherapy    Return to clinic: as scheduled    Length of Service (minutes): 45

## 2023-11-03 NOTE — PROGRESS NOTES
Subjective:     Patient ID: Isaias Collado is a 53 y.o. female.   Chief Complaint: Establish Care    HPI:  Patient presenting to establish care.    Previous PCP: None  Last visit with PCP: N/A  Reason for last visit: N/A    Annual Physical/Wellness Visit within last year: No    Review of Problems & History:  Patient Active Problem List   Diagnosis    PCOS (polycystic ovarian syndrome)    Prediabetes    Varicose veins of both lower extremities with pain    Venous insufficiency of both lower extremities    Generalized anxiety disorder with panic attacks    Fear of flying    Moderate episode of recurrent major depressive disorder      #Mental health  Managed by psychiatry  Tolerating current therapy  Needs refill of PRN xanax    #Venous insufficiency  Hx varicose veins  Previously was being worked up for injection therapy but was unable to accomplish this  No active issues    #PCOS  Has been followed by OB/GYN previously  Previously on metformin but now is on Ozempic (rx'd by OB)    #Hypercholesterolemia  Fasting labs ordered by psychiatry showed extremely high lipids  Reports she has always had high cholesterol, even in her 20s  Did not tolerate statin - had aching in hands that interfered with work    #Prediabetes  Previously on metformin, now on Ozempic as above    Past Medical History:   Diagnosis Date    Adenomyosis internal 2016    Allergy to ACE inhibitors 2016    Dysfunctional uterine bleeding 2016    Gestational diabetes 2007    Hx of renal calculi     Hypertension     no problem since weight loss     Panic attacks     PCOS (polycystic ovarian syndrome) 2016    w/ insulin resistance    Rosacea     Varicose vein of leg     laser and injection tx       Past Surgical History:   Procedure Laterality Date     SECTION      ;     HYSTERECTOMY      SKIN BIOPSY      lymphomatoid papulosis    TUBAL LIGATION Bilateral     VARICOSE VEIN SURGERY Right     laser and injection      Social  "History     Socioeconomic History    Marital status:    Tobacco Use    Smoking status: Former     Current packs/day: 0.00     Types: Cigarettes     Quit date: 2022     Years since quittin.5    Smokeless tobacco: Never   Substance and Sexual Activity    Alcohol use: Yes     Comment: socially     Drug use: No   Social History Narrative    ** Merged History Encounter **           Health Maintenance:  Colon Cancer Screening  Due for screening  Previous study completed on   Type of Study: Cologuard/DNA Test  Findings: Normal  Lung Cancer Screening  Former smoker - quit date: 2022  Pack-Years: 1/2ppd x 3 years  Cervical Cancer Screening  S/p complete hysterectomy in   Breast Cancer Screening  Last mammogram: 10 years  Last mammogram results: Had US, dense breast tissue  Next due: due date: DUE  ASCVD Risk  The 10-year ASCVD risk score (Allison HACKETT, et al., 2019) is: 2.9%    Values used to calculate the score:      Age: 53 years      Sex: Female      Is Non- : No      Diabetic: No      Tobacco smoker: No      Systolic Blood Pressure: 136 mmHg      Is BP treated: No      HDL Cholesterol: 67 mg/dL      Total Cholesterol: 302 mg/dL   Lab Results   Component Value Date    CHOL 302 (H) 2023    HDL 67 2023    TRIG 200 (H) 2023     Statin rx: no, due to Intolerance  Diet  The patient has been trying to lose weight through a healthy diet and exercise program.  in general, a "healthy" diet  , reducing sweets, reducing bread, eliminated sodas  Sexual Health  not sexually active  Vaccines  Seasonal Influenza:  due  COVID-19: Due for booster  RSV: Not indicated  Tetanus: UTD  Shingles:  due  Pneumococcal: Not indicated    Medication Review:    Current Outpatient Medications:     clobetasoL (TEMOVATE) 0.05 % cream, Apply 1 application topically 2 (two) times daily., Disp: , Rfl:     doxycycline (VIBRAMYCIN) 50 MG capsule, , Disp: , Rfl:     estradioL (ESTRACE) 1 MG " tablet, Take 1 mg by mouth., Disp: , Rfl:     FINACEA 15 % Foam, APPLY DAILY TO SKIN TO AFFECTED AREA EVERY DAY, Disp: , Rfl:     hydroCHLOROthiazide (HYDRODIURIL) 25 MG tablet, Take 25 mg by mouth once daily., Disp: , Rfl:     triamcinolone acetonide 0.5% (KENALOG) 0.5 % Crea, , Disp: , Rfl:     venlafaxine (EFFEXOR-XR) 75 MG 24 hr capsule, Take 1 capsule (75 mg total) by mouth once daily. Take with 37.5mg capsule for total of 112.5mg daily, Disp: 90 capsule, Rfl: 1    ALPRAZolam (XANAX) 0.25 MG tablet, Take 1 tablet (0.25 mg total) by mouth 2 (two) times daily as needed for Anxiety., Disp: 60 tablet, Rfl: 0    Lactobac no.41/Bifidobact no.7 (PROBIOTIC-10 ORAL), Take by mouth., Disp: , Rfl:     metFORMIN (GLUCOPHAGE) 500 MG tablet, Take 1 tablet (500 mg total) by mouth 2 (two) times daily with meals., Disp: 60 tablet, Rfl: 2    oxybutynin (DITROPAN) 5 MG Tab, Take 5 mg by mouth 3 (three) times daily., Disp: , Rfl:      Acute Concerns:  Reports severe hemorrhoids that have been present for years. Says with each bowel movement they prolapse and she has to manually replace them. They are sometimes painful and sometimes bleed. She has tried every available OTC therapy without benefit. She is interested in surgical intervention.    Also c/o right shoulder pain. Slow progression. Denies decreased ROM. Has some pain when she lays on it. No numbness/tingling. No weakness. No prior imaging or interventions.    Also c/o mid-abdominal discomfort. Reports hx gallstones but no major issues. These were dx incidentally when she had a kidney stone. Denies acid reflux or changes in bowel habits. Reports she has gained some weight over the past 1-2 years. Reports she bends over repeatedly as part of a second job and that is when she feels the discomfort the most.    Review of Systems   Constitutional:  Negative for chills and fever.   HENT:  Negative for nasal congestion, ear pain and sore throat.    Eyes:  Negative for visual  "disturbance.   Respiratory:  Negative for cough and shortness of breath.    Cardiovascular:  Negative for chest pain.   Gastrointestinal:  Positive for abdominal pain. Negative for anal bleeding, blood in stool, change in bowel habit, constipation, diarrhea, nausea, vomiting and reflux.   Genitourinary:  Negative for dysuria and frequency.   Musculoskeletal:  Positive for arthralgias. Negative for myalgias, neck pain and neck stiffness.   Integumentary:  Negative for rash.   Neurological:  Negative for headaches.   Psychiatric/Behavioral:  Negative for suicidal ideas.    All other systems reviewed and are negative.         Objective:      Vitals:    11/06/23 1549 11/06/23 1639   BP: (!) 130/100 136/81   BP Location: Right arm Right arm   Patient Position: Sitting Sitting   BP Method: Medium (Manual) Medium (Manual)   Pulse: 82    SpO2: 95%    Weight: 90.7 kg (199 lb 15.3 oz)    Height: 5' 6" (1.676 m)       Physical Exam  Vitals and nursing note reviewed.   Constitutional:       General: She is not in acute distress.     Appearance: Normal appearance. She is not ill-appearing.   HENT:      Head: Normocephalic and atraumatic.      Right Ear: Tympanic membrane, ear canal and external ear normal. There is no impacted cerumen.      Left Ear: Tympanic membrane, ear canal and external ear normal. There is no impacted cerumen.      Nose: Nose normal. No congestion or rhinorrhea.      Mouth/Throat:      Mouth: Mucous membranes are moist.      Pharynx: Oropharynx is clear. No oropharyngeal exudate or posterior oropharyngeal erythema.   Eyes:      General: No scleral icterus.        Right eye: No discharge.         Left eye: No discharge.      Conjunctiva/sclera: Conjunctivae normal.   Cardiovascular:      Rate and Rhythm: Normal rate and regular rhythm.      Pulses: Normal pulses.      Heart sounds: Normal heart sounds. No murmur heard.     No friction rub. No gallop.   Pulmonary:      Effort: Pulmonary effort is normal. No " respiratory distress.      Breath sounds: Normal breath sounds. No wheezing, rhonchi or rales.   Abdominal:      General: Abdomen is flat. Bowel sounds are normal. There is no distension.      Palpations: Abdomen is soft. There is no mass.      Tenderness: There is no abdominal tenderness. There is no guarding.   Musculoskeletal:         General: No swelling or deformity. Normal range of motion.      Right shoulder: Crepitus present. No swelling, deformity, tenderness or bony tenderness. Normal range of motion. Normal strength.      Left shoulder: Normal. No swelling, deformity, tenderness, bony tenderness or crepitus. Normal range of motion. Normal strength.      Cervical back: Normal range of motion and neck supple. No tenderness.      Comments: Negative Vernon test bilaterally. Equivocal Neer's test on the R   Skin:     General: Skin is warm and dry.   Neurological:      General: No focal deficit present.      Mental Status: She is alert and oriented to person, place, and time. Mental status is at baseline.      Gait: Gait normal.   Psychiatric:         Mood and Affect: Mood normal.         Behavior: Behavior normal.         Thought Content: Thought content normal.         Judgment: Judgment normal.           Assessment:       Problem List Items Addressed This Visit          Psychiatric    Generalized anxiety disorder with panic attacks     Other Visit Diagnoses       Encounter to establish care with new doctor    -  Primary    Chronic left shoulder pain        Relevant Orders    Ambulatory referral/consult to Physical/Occupational Therapy    Encounter for screening mammogram for malignant neoplasm of breast        Relevant Orders    Mammo Digital Screening Bilat    Hemorrhoid prolapse        Relevant Orders    Ambulatory referral/consult to General Surgery    Hypercholesteremia        Relevant Orders    Ambulatory referral/consult to Genetics    Encounter for screening for colorectal malignant neoplasm         Relevant Orders    Ambulatory referral/consult to Endo Procedure               Plan:       1. Encounter to establish care with new doctor  Reviewed chronic medical conditions, updated problem list and medication list  Influenza administered today  Pt to set up vaccine appt for shingles and COVID booster    2. Chronic left shoulder pain  Relatively benign exam  Suspect OA changes vs mild rotator cuff inflammation  Referral to PT  Consider imaging if sx persist or worsen  -     Ambulatory referral/consult to Physical/Occupational Therapy; Future; Expected date: 11/13/2023    3. Generalized anxiety disorder with panic attacks  Stable/improving per pt report  Continue to follow with psychiatry  Refilling 30 day supply of Xanax (Rx printed/signed by Dr. Gregory Wagner due to this provider having technology issues)  -     Discontinue: ALPRAZolam (XANAX) 0.25 MG tablet; Take 1 tablet (0.25 mg total) by mouth 2 (two) times daily as needed for Anxiety.  Dispense: 60 tablet; Refill: 0    4. Encounter for screening mammogram for malignant neoplasm of breast  Referral for mammogram ordered  -     Mammo Digital Screening Bilat; Future; Expected date: 11/06/2023    5. Hemorrhoid prolapse  Hemorrhoid exam deferred today, however will refer to surgery for possible intervention based upon hx  -     Ambulatory referral/consult to General Surgery; Future; Expected date: 11/13/2023    6. Hypercholesteremia  Possibly related to familial hypercholesterolemia given pt's hx and extremely elevated values  Referral to genetics for focused testing  Will need to consider possible alternative treatment options as pt did not tolerate statin  -     Ambulatory referral/consult to Genetics; Future; Expected date: 11/13/2023    7. Encounter for screening for colorectal malignant neoplasm  Referral for colonoscopy  -     Ambulatory referral/consult to Endo Procedure ; Future; Expected date: 11/07/2023    Other orders  -     Influenza -  Quadrivalent (PF)           Follow up in about 3 months (around 2/6/2024), or additional health concerns.     Bhavik Ryan MD, [facility]  Family Medicine Physician  [facility]  11/06/2023

## 2023-11-06 ENCOUNTER — OFFICE VISIT (OUTPATIENT)
Dept: INTERNAL MEDICINE | Facility: CLINIC | Age: 53
End: 2023-11-06
Payer: COMMERCIAL

## 2023-11-06 VITALS
SYSTOLIC BLOOD PRESSURE: 136 MMHG | HEIGHT: 66 IN | BODY MASS INDEX: 32.13 KG/M2 | WEIGHT: 199.94 LBS | DIASTOLIC BLOOD PRESSURE: 81 MMHG | OXYGEN SATURATION: 95 % | HEART RATE: 82 BPM

## 2023-11-06 DIAGNOSIS — M25.512 CHRONIC LEFT SHOULDER PAIN: ICD-10-CM

## 2023-11-06 DIAGNOSIS — Z76.89 ENCOUNTER TO ESTABLISH CARE WITH NEW DOCTOR: Primary | ICD-10-CM

## 2023-11-06 DIAGNOSIS — F41.1 GENERALIZED ANXIETY DISORDER WITH PANIC ATTACKS: ICD-10-CM

## 2023-11-06 DIAGNOSIS — Z12.11 ENCOUNTER FOR SCREENING FOR COLORECTAL MALIGNANT NEOPLASM: ICD-10-CM

## 2023-11-06 DIAGNOSIS — G89.29 CHRONIC LEFT SHOULDER PAIN: ICD-10-CM

## 2023-11-06 DIAGNOSIS — Z12.12 ENCOUNTER FOR SCREENING FOR COLORECTAL MALIGNANT NEOPLASM: ICD-10-CM

## 2023-11-06 DIAGNOSIS — E78.00 HYPERCHOLESTEREMIA: ICD-10-CM

## 2023-11-06 DIAGNOSIS — F41.0 GENERALIZED ANXIETY DISORDER WITH PANIC ATTACKS: ICD-10-CM

## 2023-11-06 DIAGNOSIS — Z12.31 ENCOUNTER FOR SCREENING MAMMOGRAM FOR MALIGNANT NEOPLASM OF BREAST: ICD-10-CM

## 2023-11-06 DIAGNOSIS — K64.8 HEMORRHOID PROLAPSE: ICD-10-CM

## 2023-11-06 DIAGNOSIS — F40.00 AGORAPHOBIA: ICD-10-CM

## 2023-11-06 PROBLEM — F17.200 SMOKING: Status: RESOLVED | Noted: 2020-02-07 | Resolved: 2023-11-06

## 2023-11-06 PROCEDURE — 3079F DIAST BP 80-89 MM HG: CPT | Mod: CPTII,S$GLB,, | Performed by: FAMILY MEDICINE

## 2023-11-06 PROCEDURE — 99214 PR OFFICE/OUTPT VISIT, EST, LEVL IV, 30-39 MIN: ICD-10-PCS | Mod: 25,S$GLB,, | Performed by: FAMILY MEDICINE

## 2023-11-06 PROCEDURE — 90471 FLU VACCINE (QUAD) GREATER THAN OR EQUAL TO 3YO PRESERVATIVE FREE IM: ICD-10-PCS | Mod: S$GLB,,, | Performed by: FAMILY MEDICINE

## 2023-11-06 PROCEDURE — 3075F PR MOST RECENT SYSTOLIC BLOOD PRESS GE 130-139MM HG: ICD-10-PCS | Mod: CPTII,S$GLB,, | Performed by: FAMILY MEDICINE

## 2023-11-06 PROCEDURE — 90686 IIV4 VACC NO PRSV 0.5 ML IM: CPT | Mod: S$GLB,,, | Performed by: FAMILY MEDICINE

## 2023-11-06 PROCEDURE — 3008F PR BODY MASS INDEX (BMI) DOCUMENTED: ICD-10-PCS | Mod: CPTII,S$GLB,, | Performed by: FAMILY MEDICINE

## 2023-11-06 PROCEDURE — 1160F RVW MEDS BY RX/DR IN RCRD: CPT | Mod: CPTII,S$GLB,, | Performed by: FAMILY MEDICINE

## 2023-11-06 PROCEDURE — 3044F HG A1C LEVEL LT 7.0%: CPT | Mod: CPTII,S$GLB,, | Performed by: FAMILY MEDICINE

## 2023-11-06 PROCEDURE — 1159F PR MEDICATION LIST DOCUMENTED IN MEDICAL RECORD: ICD-10-PCS | Mod: CPTII,S$GLB,, | Performed by: FAMILY MEDICINE

## 2023-11-06 PROCEDURE — 3075F SYST BP GE 130 - 139MM HG: CPT | Mod: CPTII,S$GLB,, | Performed by: FAMILY MEDICINE

## 2023-11-06 PROCEDURE — 99999 PR PBB SHADOW E&M-EST. PATIENT-LVL V: ICD-10-PCS | Mod: PBBFAC,,, | Performed by: FAMILY MEDICINE

## 2023-11-06 PROCEDURE — 3044F PR MOST RECENT HEMOGLOBIN A1C LEVEL <7.0%: ICD-10-PCS | Mod: CPTII,S$GLB,, | Performed by: FAMILY MEDICINE

## 2023-11-06 PROCEDURE — 3008F BODY MASS INDEX DOCD: CPT | Mod: CPTII,S$GLB,, | Performed by: FAMILY MEDICINE

## 2023-11-06 PROCEDURE — 1160F PR REVIEW ALL MEDS BY PRESCRIBER/CLIN PHARMACIST DOCUMENTED: ICD-10-PCS | Mod: CPTII,S$GLB,, | Performed by: FAMILY MEDICINE

## 2023-11-06 PROCEDURE — 1159F MED LIST DOCD IN RCRD: CPT | Mod: CPTII,S$GLB,, | Performed by: FAMILY MEDICINE

## 2023-11-06 PROCEDURE — 90686 FLU VACCINE (QUAD) GREATER THAN OR EQUAL TO 3YO PRESERVATIVE FREE IM: ICD-10-PCS | Mod: S$GLB,,, | Performed by: FAMILY MEDICINE

## 2023-11-06 PROCEDURE — 3079F PR MOST RECENT DIASTOLIC BLOOD PRESSURE 80-89 MM HG: ICD-10-PCS | Mod: CPTII,S$GLB,, | Performed by: FAMILY MEDICINE

## 2023-11-06 PROCEDURE — 99999 PR PBB SHADOW E&M-EST. PATIENT-LVL V: CPT | Mod: PBBFAC,,, | Performed by: FAMILY MEDICINE

## 2023-11-06 PROCEDURE — 90471 IMMUNIZATION ADMIN: CPT | Mod: S$GLB,,, | Performed by: FAMILY MEDICINE

## 2023-11-06 PROCEDURE — 99214 OFFICE O/P EST MOD 30 MIN: CPT | Mod: 25,S$GLB,, | Performed by: FAMILY MEDICINE

## 2023-11-06 RX ORDER — ALPRAZOLAM 0.25 MG/1
0.25 TABLET ORAL 2 TIMES DAILY PRN
Qty: 60 TABLET | Refills: 0 | Status: SHIPPED | OUTPATIENT
Start: 2023-11-06 | End: 2024-03-25

## 2023-11-06 RX ORDER — ALPRAZOLAM 0.25 MG/1
0.25 TABLET ORAL 2 TIMES DAILY PRN
Qty: 60 TABLET | Refills: 0 | OUTPATIENT
Start: 2023-11-06 | End: 2023-11-06 | Stop reason: SDUPTHER

## 2023-11-06 RX ORDER — ESTRADIOL 1 MG/1
1 TABLET ORAL
COMMUNITY
Start: 2023-09-27

## 2023-11-06 RX ORDER — AZELAIC ACID 0.15 G/G
AEROSOL, FOAM TOPICAL
COMMUNITY
Start: 2023-07-29

## 2023-11-06 NOTE — PROGRESS NOTES
Refilled medication for my Colleague Dr. Ryan, because his RSA token wasn't setup at time of appt.

## 2023-11-06 NOTE — PATIENT INSTRUCTIONS
Flu shot, Shingles vaccine,COVID booster  Referral to genetics  Referral for colonoscopy  Referral to general surgery  Mammogram  Refilled xanax 30 day supply

## 2023-11-08 ENCOUNTER — OFFICE VISIT (OUTPATIENT)
Dept: PSYCHIATRY | Facility: CLINIC | Age: 53
End: 2023-11-08
Payer: COMMERCIAL

## 2023-11-08 DIAGNOSIS — F41.0 GENERALIZED ANXIETY DISORDER WITH PANIC ATTACKS: Primary | ICD-10-CM

## 2023-11-08 DIAGNOSIS — F41.1 GENERALIZED ANXIETY DISORDER WITH PANIC ATTACKS: Primary | ICD-10-CM

## 2023-11-08 DIAGNOSIS — F33.1 MODERATE EPISODE OF RECURRENT MAJOR DEPRESSIVE DISORDER: ICD-10-CM

## 2023-11-08 PROBLEM — F40.243 FEAR OF FLYING: Status: RESOLVED | Noted: 2022-03-30 | Resolved: 2023-11-08

## 2023-11-08 PROCEDURE — 3044F PR MOST RECENT HEMOGLOBIN A1C LEVEL <7.0%: ICD-10-PCS | Mod: CPTII,S$GLB,, | Performed by: SOCIAL WORKER

## 2023-11-08 PROCEDURE — 1159F MED LIST DOCD IN RCRD: CPT | Mod: CPTII,S$GLB,, | Performed by: SOCIAL WORKER

## 2023-11-08 PROCEDURE — 1159F PR MEDICATION LIST DOCUMENTED IN MEDICAL RECORD: ICD-10-PCS | Mod: CPTII,S$GLB,, | Performed by: SOCIAL WORKER

## 2023-11-08 PROCEDURE — 90837 PSYTX W PT 60 MINUTES: CPT | Mod: S$GLB,,, | Performed by: SOCIAL WORKER

## 2023-11-08 PROCEDURE — 99999 PR PBB SHADOW E&M-EST. PATIENT-LVL I: CPT | Mod: PBBFAC,,, | Performed by: SOCIAL WORKER

## 2023-11-08 PROCEDURE — 99999 PR PBB SHADOW E&M-EST. PATIENT-LVL I: ICD-10-PCS | Mod: PBBFAC,,, | Performed by: SOCIAL WORKER

## 2023-11-08 PROCEDURE — 90837 PR PSYCHOTHERAPY W/PATIENT, 60 MIN: ICD-10-PCS | Mod: S$GLB,,, | Performed by: SOCIAL WORKER

## 2023-11-08 PROCEDURE — 3044F HG A1C LEVEL LT 7.0%: CPT | Mod: CPTII,S$GLB,, | Performed by: SOCIAL WORKER

## 2023-11-08 NOTE — PROGRESS NOTES
Individual Psychotherapy (LCSW/PhD)  Isaias Collado,  11/8/2023    Site:  [facility]         Therapeutic Intervention: Met with patient for individual psychotherapy.    Chief complaint/reason for encounter: depression and anxiety     Interval history and content of current session: Pt reported her ex- is in remission from Cancer. PT reported her co-worker lost her baby and that was sad to watch. PT reported her anxiety has increased with her thrifting, she is thinking about down-sizing. PT reported she is working on weight loss. She reported she has lost weight. She has also been exercising. She denies SI, no HI or AVH.     Treatment plan:  Target symptoms: depression, anxiety   Why chosen therapy is appropriate versus another modality: relevant to diagnosis, patient responds to this modality, evidence based practice  Outcome monitoring methods: self-report, observation, checklist/rating scale  Therapeutic intervention type: insight oriented psychotherapy, behavior modifying psychotherapy, supportive psychotherapy    Risk parameters:  Patient reports no suicidal ideation  Patient reports no homicidal ideation  Patient reports no self-injurious behavior  Patient reports no violent behavior    Verbal deficits: None    Patient's response to intervention:  The patient's response to intervention is accepting, motivated.    Progress toward goals and other mental status changes:  The patient's progress toward goals is fair .    Diagnosis:     ICD-10-CM ICD-9-CM   1. Generalized anxiety disorder with panic attacks  F41.1 300.02    F41.0 300.01   2. Moderate episode of recurrent major depressive disorder  F33.1 296.32           Plan: Pt plans to continue individual psychotherapy    Return to clinic: as scheduled    Length of Service (minutes): 60

## 2023-11-10 ENCOUNTER — OFFICE VISIT (OUTPATIENT)
Dept: SURGERY | Facility: CLINIC | Age: 53
End: 2023-11-10
Payer: COMMERCIAL

## 2023-11-10 VITALS
SYSTOLIC BLOOD PRESSURE: 116 MMHG | HEART RATE: 90 BPM | DIASTOLIC BLOOD PRESSURE: 79 MMHG | BODY MASS INDEX: 31.73 KG/M2 | HEIGHT: 66 IN | WEIGHT: 197.44 LBS

## 2023-11-10 DIAGNOSIS — K64.8 HEMORRHOID PROLAPSE: ICD-10-CM

## 2023-11-10 PROCEDURE — 99999 PR PBB SHADOW E&M-EST. PATIENT-LVL III: ICD-10-PCS | Mod: PBBFAC,,, | Performed by: STUDENT IN AN ORGANIZED HEALTH CARE EDUCATION/TRAINING PROGRAM

## 2023-11-10 PROCEDURE — 3078F PR MOST RECENT DIASTOLIC BLOOD PRESSURE < 80 MM HG: ICD-10-PCS | Mod: CPTII,S$GLB,, | Performed by: STUDENT IN AN ORGANIZED HEALTH CARE EDUCATION/TRAINING PROGRAM

## 2023-11-10 PROCEDURE — 99204 PR OFFICE/OUTPT VISIT, NEW, LEVL IV, 45-59 MIN: ICD-10-PCS | Mod: S$GLB,,, | Performed by: STUDENT IN AN ORGANIZED HEALTH CARE EDUCATION/TRAINING PROGRAM

## 2023-11-10 PROCEDURE — 3074F SYST BP LT 130 MM HG: CPT | Mod: CPTII,S$GLB,, | Performed by: STUDENT IN AN ORGANIZED HEALTH CARE EDUCATION/TRAINING PROGRAM

## 2023-11-10 PROCEDURE — 3008F BODY MASS INDEX DOCD: CPT | Mod: CPTII,S$GLB,, | Performed by: STUDENT IN AN ORGANIZED HEALTH CARE EDUCATION/TRAINING PROGRAM

## 2023-11-10 PROCEDURE — 1159F PR MEDICATION LIST DOCUMENTED IN MEDICAL RECORD: ICD-10-PCS | Mod: CPTII,S$GLB,, | Performed by: STUDENT IN AN ORGANIZED HEALTH CARE EDUCATION/TRAINING PROGRAM

## 2023-11-10 PROCEDURE — 3044F HG A1C LEVEL LT 7.0%: CPT | Mod: CPTII,S$GLB,, | Performed by: STUDENT IN AN ORGANIZED HEALTH CARE EDUCATION/TRAINING PROGRAM

## 2023-11-10 PROCEDURE — 99999 PR PBB SHADOW E&M-EST. PATIENT-LVL III: CPT | Mod: PBBFAC,,, | Performed by: STUDENT IN AN ORGANIZED HEALTH CARE EDUCATION/TRAINING PROGRAM

## 2023-11-10 PROCEDURE — 99204 OFFICE O/P NEW MOD 45 MIN: CPT | Mod: S$GLB,,, | Performed by: STUDENT IN AN ORGANIZED HEALTH CARE EDUCATION/TRAINING PROGRAM

## 2023-11-10 PROCEDURE — 3008F PR BODY MASS INDEX (BMI) DOCUMENTED: ICD-10-PCS | Mod: CPTII,S$GLB,, | Performed by: STUDENT IN AN ORGANIZED HEALTH CARE EDUCATION/TRAINING PROGRAM

## 2023-11-10 PROCEDURE — 3074F PR MOST RECENT SYSTOLIC BLOOD PRESSURE < 130 MM HG: ICD-10-PCS | Mod: CPTII,S$GLB,, | Performed by: STUDENT IN AN ORGANIZED HEALTH CARE EDUCATION/TRAINING PROGRAM

## 2023-11-10 PROCEDURE — 1159F MED LIST DOCD IN RCRD: CPT | Mod: CPTII,S$GLB,, | Performed by: STUDENT IN AN ORGANIZED HEALTH CARE EDUCATION/TRAINING PROGRAM

## 2023-11-10 PROCEDURE — 3044F PR MOST RECENT HEMOGLOBIN A1C LEVEL <7.0%: ICD-10-PCS | Mod: CPTII,S$GLB,, | Performed by: STUDENT IN AN ORGANIZED HEALTH CARE EDUCATION/TRAINING PROGRAM

## 2023-11-10 PROCEDURE — 3078F DIAST BP <80 MM HG: CPT | Mod: CPTII,S$GLB,, | Performed by: STUDENT IN AN ORGANIZED HEALTH CARE EDUCATION/TRAINING PROGRAM

## 2023-11-10 NOTE — PROGRESS NOTES
Patient ID: Isaias Collado is a 53 y.o. female.    Chief Complaint: No chief complaint on file.      HPI:  HPI  53F with prolapsing rectal mass, hemorrhoid. Says she has occasional blood. Able to push it back in. Been going on for years. Sometimes has pain when its out.  Long term hx of constipation.   Takes fiber and stool softeners.   No fam hx of colon cancer   Hx of hyst  Never had cscope  Did cologuard a few years ago that was neg    Review of Systems   Constitutional:  Negative for fever.   HENT:  Negative for trouble swallowing.    Respiratory:  Negative for shortness of breath.    Cardiovascular:  Negative for chest pain.   Gastrointestinal:  Negative for abdominal pain, blood in stool, nausea and vomiting.   Genitourinary:  Negative for dysuria.   Musculoskeletal:  Negative for gait problem.   Skin:  Negative for rash and wound.   Allergic/Immunologic: Negative for immunocompromised state.   Neurological:  Negative for weakness.   Hematological:  Does not bruise/bleed easily.   Psychiatric/Behavioral:  Negative for agitation.        Current Outpatient Medications   Medication Sig Dispense Refill    ALPRAZolam (XANAX) 0.25 MG tablet Take 1 tablet (0.25 mg total) by mouth 2 (two) times daily as needed for Anxiety. 60 tablet 0    clobetasoL (TEMOVATE) 0.05 % cream Apply 1 application topically 2 (two) times daily.      doxycycline (VIBRAMYCIN) 50 MG capsule       estradioL (ESTRACE) 1 MG tablet Take 1 mg by mouth.      FINACEA 15 % Foam APPLY DAILY TO SKIN TO AFFECTED AREA EVERY DAY      hydroCHLOROthiazide (HYDRODIURIL) 25 MG tablet Take 25 mg by mouth once daily.      Lactobac no.41/Bifidobact no.7 (PROBIOTIC-10 ORAL) Take by mouth.      oxybutynin (DITROPAN) 5 MG Tab Take 5 mg by mouth 3 (three) times daily.      triamcinolone acetonide 0.5% (KENALOG) 0.5 % Crea       venlafaxine (EFFEXOR-XR) 75 MG 24 hr capsule Take 1 capsule (75 mg total) by mouth once daily. Take with 37.5mg capsule for total of 112.5mg  daily 90 capsule 1    metFORMIN (GLUCOPHAGE) 500 MG tablet Take 1 tablet (500 mg total) by mouth 2 (two) times daily with meals. 60 tablet 2     No current facility-administered medications for this visit.       Review of patient's allergies indicates:   Allergen Reactions    Lisinopril Other (See Comments)     Cough.    Erythromycin Other (See Comments)     Nausea and cold sweats    Morphine Itching    Adhesive Rash     Paper tape ok       Past Medical History:   Diagnosis Date    Adenomyosis internal 2016    Allergy to ACE inhibitors 2016    Dysfunctional uterine bleeding 2016    Gestational diabetes     Hx of renal calculi     Hypertension     no problem since weight loss     Panic attacks     PCOS (polycystic ovarian syndrome) 2016    w/ insulin resistance    Rosacea     Varicose vein of leg     laser and injection tx        Past Surgical History:   Procedure Laterality Date     SECTION      ;     HYSTERECTOMY      SKIN BIOPSY      lymphomatoid papulosis    TUBAL LIGATION Bilateral     VARICOSE VEIN SURGERY Right     laser and injection       N/A  Family History   Problem Relation Age of Onset    Heart failure Father     Heart disease Father     Diabetes Mother     Coronary artery disease Father     Diabetes Father        Social History     Socioeconomic History    Marital status:    Tobacco Use    Smoking status: Former     Current packs/day: 0.00     Types: Cigarettes     Quit date: 2022     Years since quittin.5    Smokeless tobacco: Never   Substance and Sexual Activity    Alcohol use: Yes     Comment: socially     Drug use: No   Social History Narrative    ** Merged History Encounter **            Vitals:    11/10/23 0800   BP: 116/79   Pulse: 90       Physical Exam  Constitutional:       General: She is not in acute distress.     Appearance: She is well-developed.   HENT:      Head: Normocephalic and atraumatic.   Eyes:      General: No scleral  icterus.  Cardiovascular:      Rate and Rhythm: Normal rate.   Pulmonary:      Effort: Pulmonary effort is normal.      Breath sounds: No stridor.   Abdominal:      General: There is no distension.      Palpations: Abdomen is soft.      Tenderness: There is no abdominal tenderness.   Genitourinary:     Comments: No external hemorrhoid  No obvious internal hemorrhoid palpated, no TTP, no mass. Small amount of gross blood  Lymphadenopathy:      Cervical: No cervical adenopathy.   Skin:     General: Skin is warm.      Findings: No erythema.   Neurological:      Mental Status: She is alert and oriented to person, place, and time.   Psychiatric:         Behavior: Behavior normal.     Body mass index is 31.86 kg/m².  Hga1c 5.7      Assessment & Plan:  53F with hemorrhoid?  Not obvious on exam today  Increase fiber  Avoid steroids  Needs cscope  Can return here after cscope if still a problem

## 2023-11-20 ENCOUNTER — TELEPHONE (OUTPATIENT)
Dept: ENDOSCOPY | Facility: HOSPITAL | Age: 53
End: 2023-11-20

## 2023-11-20 ENCOUNTER — CLINICAL SUPPORT (OUTPATIENT)
Dept: ENDOSCOPY | Facility: HOSPITAL | Age: 53
End: 2023-11-20
Attending: FAMILY MEDICINE
Payer: COMMERCIAL

## 2023-11-20 DIAGNOSIS — Z12.11 ENCOUNTER FOR SCREENING FOR COLORECTAL MALIGNANT NEOPLASM: ICD-10-CM

## 2023-11-20 DIAGNOSIS — Z12.12 ENCOUNTER FOR SCREENING FOR COLORECTAL MALIGNANT NEOPLASM: ICD-10-CM

## 2023-11-20 RX ORDER — POLYETHYLENE GLYCOL 3350, SODIUM SULFATE ANHYDROUS, SODIUM BICARBONATE, SODIUM CHLORIDE, POTASSIUM CHLORIDE 236; 22.74; 6.74; 5.86; 2.97 G/4L; G/4L; G/4L; G/4L; G/4L
4 POWDER, FOR SOLUTION ORAL ONCE
Qty: 4000 ML | Refills: 0 | Status: SHIPPED | OUTPATIENT
Start: 2023-11-20 | End: 2023-11-20

## 2023-11-20 NOTE — TELEPHONE ENCOUNTER
Spoke to patient to schedule procedure(s) Colonoscopy       Physician to perform procedure(s) Dr. JOSE Garcia  Date of Procedure (s) 3/25/24  Arrival Time 8:30 AM  Time of Procedure(s) 9:30 AM   Location of Procedure(s) [place] [address] Floor  Type of Rx Prep sent to patient: PEG  Instructions provided to patient via [facility]    Patient was informed on the following information and verbalized understanding. Screening questionnaire reviewed with patient and complete. If procedure requires anesthesia, a responsible adult needs to be present to accompany the patient home, patient cannot drive after receiving anesthesia. Appointment details are tentative, especially check-in time. Patient will receive a prep-op call 7 days prior to confirm check-in time for procedure. If applicable the patient should contact their pharmacy to verify Rx for procedure prep is ready for pick-up. Patient was advised to call the scheduling department at [phone] if pharmacy states no Rx is available. Patient was advised to call the endoscopy scheduling department if any questions or concerns arise.      [facility] Scheduling Department

## 2023-11-22 ENCOUNTER — OFFICE VISIT (OUTPATIENT)
Dept: PSYCHIATRY | Facility: CLINIC | Age: 53
End: 2023-11-22
Payer: COMMERCIAL

## 2023-11-22 DIAGNOSIS — F41.1 GENERALIZED ANXIETY DISORDER WITH PANIC ATTACKS: Primary | ICD-10-CM

## 2023-11-22 DIAGNOSIS — F33.1 MODERATE EPISODE OF RECURRENT MAJOR DEPRESSIVE DISORDER: ICD-10-CM

## 2023-11-22 DIAGNOSIS — F41.0 GENERALIZED ANXIETY DISORDER WITH PANIC ATTACKS: Primary | ICD-10-CM

## 2023-11-22 PROCEDURE — 3044F HG A1C LEVEL LT 7.0%: CPT | Mod: CPTII,S$GLB,, | Performed by: SOCIAL WORKER

## 2023-11-22 PROCEDURE — 99999 PR PBB SHADOW E&M-EST. PATIENT-LVL I: CPT | Mod: PBBFAC,,, | Performed by: SOCIAL WORKER

## 2023-11-22 PROCEDURE — 90837 PSYTX W PT 60 MINUTES: CPT | Mod: S$GLB,,, | Performed by: SOCIAL WORKER

## 2023-11-22 PROCEDURE — 3044F PR MOST RECENT HEMOGLOBIN A1C LEVEL <7.0%: ICD-10-PCS | Mod: CPTII,S$GLB,, | Performed by: SOCIAL WORKER

## 2023-11-22 PROCEDURE — 90837 PR PSYCHOTHERAPY W/PATIENT, 60 MIN: ICD-10-PCS | Mod: S$GLB,,, | Performed by: SOCIAL WORKER

## 2023-11-22 PROCEDURE — 1159F PR MEDICATION LIST DOCUMENTED IN MEDICAL RECORD: ICD-10-PCS | Mod: CPTII,S$GLB,, | Performed by: SOCIAL WORKER

## 2023-11-22 PROCEDURE — 1159F MED LIST DOCD IN RCRD: CPT | Mod: CPTII,S$GLB,, | Performed by: SOCIAL WORKER

## 2023-11-22 PROCEDURE — 99999 PR PBB SHADOW E&M-EST. PATIENT-LVL I: ICD-10-PCS | Mod: PBBFAC,,, | Performed by: SOCIAL WORKER

## 2023-11-22 NOTE — PROGRESS NOTES
"Individual Psychotherapy (LCSW/PhD)  Isaias Collado,  11/22/2023    Site:  [facility]         Therapeutic Intervention: Met with patient for individual psychotherapy.    Chief complaint/reason for encounter: depression and anxiety     Interval history and content of current session: Pt reported she did scale back her purchasing. Pt reported her mom was pushing her to go to her brother Donovan's house, but pt told her no. Pt reported she and her son do not care to go. PT is worried about this child that her son, Danis has been hanging out with. She was worrying about him socially, but he start going to the gym; which has been good. PT reported she has started getting her [place] tree, getting her won ornaments and painting them. PT reported her high school boyfriend, she cheated on. PT reported the relationship started as a friendship, and she wanted it to stay that way, but she never told him. She stated, "I just went a long with it." We talked about how this has been a problem till this day. PT reported when she was 16 hooked up with a  man. PT reported a friend of her brother's now; who is  is trying to be with her. We talked about being direct with him and setting boundaries for herself, as opposed to just agreeing and going along with it, as she has done in the past. Pt was agreeable to this plan. She denies SI, no HI or AVH.     Treatment plan:  Target symptoms: depression, anxiety   Why chosen therapy is appropriate versus another modality: relevant to diagnosis, patient responds to this modality, evidence based practice  Outcome monitoring methods: self-report, observation, checklist/rating scale  Therapeutic intervention type: insight oriented psychotherapy, behavior modifying psychotherapy, supportive psychotherapy    Risk parameters:  Patient reports no suicidal ideation  Patient reports no homicidal ideation  Patient reports no self-injurious behavior  Patient reports no violent " behavior    Verbal deficits: None    Patient's response to intervention:  The patient's response to intervention is accepting, motivated.    Progress toward goals and other mental status changes:  The patient's progress toward goals is fair .    Diagnosis:     ICD-10-CM ICD-9-CM   1. Generalized anxiety disorder with panic attacks  F41.1 300.02    F41.0 300.01   2. Moderate episode of recurrent major depressive disorder  F33.1 296.32         Plan: Pt plans to continue individual psychotherapy    Return to clinic: as scheduled    Length of Service (minutes): 60                               Airborne/Contact

## 2023-12-05 ENCOUNTER — HOSPITAL ENCOUNTER (OUTPATIENT)
Dept: RADIOLOGY | Facility: HOSPITAL | Age: 53
Discharge: HOME | End: 2023-12-05
Attending: FAMILY MEDICINE
Payer: COMMERCIAL

## 2023-12-05 DIAGNOSIS — Z12.31 ENCOUNTER FOR SCREENING MAMMOGRAM FOR MALIGNANT NEOPLASM OF BREAST: ICD-10-CM

## 2023-12-05 PROCEDURE — 77067 MAMMO DIGITAL SCREENING BILAT WITH TOMO: ICD-10-PCS | Mod: 26,,, | Performed by: RADIOLOGY

## 2023-12-05 PROCEDURE — 77067 SCR MAMMO BI INCL CAD: CPT | Mod: 26,,, | Performed by: RADIOLOGY

## 2023-12-05 PROCEDURE — 77067 SCR MAMMO BI INCL CAD: CPT | Mod: TC

## 2023-12-05 PROCEDURE — 77063 BREAST TOMOSYNTHESIS BI: CPT | Mod: 26,,, | Performed by: RADIOLOGY

## 2023-12-05 PROCEDURE — 77063 MAMMO DIGITAL SCREENING BILAT WITH TOMO: ICD-10-PCS | Mod: 26,,, | Performed by: RADIOLOGY

## 2024-01-02 ENCOUNTER — OFFICE VISIT (OUTPATIENT)
Dept: PSYCHIATRY | Facility: CLINIC | Age: 54
End: 2024-01-02
Payer: COMMERCIAL

## 2024-01-02 DIAGNOSIS — F33.1 MODERATE EPISODE OF RECURRENT MAJOR DEPRESSIVE DISORDER: ICD-10-CM

## 2024-01-02 DIAGNOSIS — F41.0 GENERALIZED ANXIETY DISORDER WITH PANIC ATTACKS: Primary | ICD-10-CM

## 2024-01-02 DIAGNOSIS — F41.1 GENERALIZED ANXIETY DISORDER WITH PANIC ATTACKS: Primary | ICD-10-CM

## 2024-01-02 PROCEDURE — 1159F MED LIST DOCD IN RCRD: CPT | Mod: CPTII,S$GLB,, | Performed by: SOCIAL WORKER

## 2024-01-02 PROCEDURE — 90837 PSYTX W PT 60 MINUTES: CPT | Mod: S$GLB,,, | Performed by: SOCIAL WORKER

## 2024-01-02 PROCEDURE — 99999 PR PBB SHADOW E&M-EST. PATIENT-LVL I: CPT | Mod: PBBFAC,,, | Performed by: SOCIAL WORKER

## 2024-01-02 NOTE — PROGRESS NOTES
Individual Psychotherapy (LCSW/PhD)  Isaias Collado,  1/2/2024    Site:  [facility]         Therapeutic Intervention: Met with patient for individual psychotherapy.    Chief complaint/reason for encounter: depression and anxiety     Interval history and content of current session: Pt reported she has kept the boundaries with the neighbor. She reported Francisco Javier and her boyfriend came for [place]. We processed the type of man, she is looking for. WE discussed trying [email or web address]. She reproted she is focusing on self-care. She continues with the thrifting and also trying to lose weight. She reported end of May (29) they are going to [place] for 2 weeks. She denies SI, no HI or AVH.     Treatment plan:  Target symptoms: depression, anxiety   Why chosen therapy is appropriate versus another modality: relevant to diagnosis, patient responds to this modality, evidence based practice  Outcome monitoring methods: self-report, observation, checklist/rating scale  Therapeutic intervention type: insight oriented psychotherapy, behavior modifying psychotherapy, supportive psychotherapy    Risk parameters:  Patient reports no suicidal ideation  Patient reports no homicidal ideation  Patient reports no self-injurious behavior  Patient reports no violent behavior    Verbal deficits: None    Patient's response to intervention:  The patient's response to intervention is accepting, motivated.    Progress toward goals and other mental status changes:  The patient's progress toward goals is fair .    Diagnosis:     ICD-10-CM ICD-9-CM   1. Generalized anxiety disorder with panic attacks  F41.1 300.02    F41.0 300.01   2. Moderate episode of recurrent major depressive disorder  F33.1 296.32           Plan: Pt plans to continue individual psychotherapy    Return to clinic: as scheduled    Length of Service (minutes): 60

## 2024-01-08 ENCOUNTER — LAB VISIT (OUTPATIENT)
Dept: LAB | Facility: HOSPITAL | Age: 54
End: 2024-01-08
Attending: MEDICAL GENETICS
Payer: COMMERCIAL

## 2024-01-08 ENCOUNTER — OFFICE VISIT (OUTPATIENT)
Dept: GENETICS | Facility: CLINIC | Age: 54
End: 2024-01-08
Payer: COMMERCIAL

## 2024-01-08 DIAGNOSIS — E78.00 HYPERCHOLESTEREMIA: ICD-10-CM

## 2024-01-08 PROCEDURE — 99999 PR PBB SHADOW E&M-EST. PATIENT-LVL III: CPT | Mod: PBBFAC,,, | Performed by: GENETIC COUNSELOR, MS

## 2024-01-08 PROCEDURE — 96040 PR GENETIC COUNSELING, EACH 30 MIN: CPT | Mod: S$GLB,,, | Performed by: GENETIC COUNSELOR, MS

## 2024-01-08 PROCEDURE — 36415 COLL VENOUS BLD VENIPUNCTURE: CPT | Performed by: MEDICAL GENETICS

## 2024-01-09 NOTE — PROGRESS NOTES
Isaias Collado   : 1970  MRN: [id number]    REFERRING MD: Bhavik Ryan MD    REASON FOR CONSULT: Our [facility] was asked to provide genetic counseling for this 53-year-old female with high cholesterol.     HISTORY OF PRESENT ILLNESS: Ms. Collado is a 53-year-old female with high cholesterol. Her most recent cholesterol was 302 mg/dL. She was first diagnosed with high cholesterol in her 20s. She reports that it is has always been above or around 300. She tried a statin in the past but had joint pain and stopped. She has a family history of high cholesterol.     MEDICAL HISTORY:  Active Problem List with Overview Notes    Diagnosis Date Noted    Moderate episode of recurrent major depressive disorder 07/15/2023    Generalized anxiety disorder with panic attacks 2022    Venous insufficiency of both lower extremities 2020    Varicose veins of both lower extremities with pain 2020    PCOS (polycystic ovarian syndrome) 2016    Prediabetes 2016     FAMILY HISTORY:  Ms. Collado has two daughters ages 31 and 30 and a 16-year-old son. Her oldest daughter has two children who are 8 and 9. She has a 58-year-old brother. She does not know if he has high cholesterol. She has three maternal half-brothers. One is  due to liver cancer and an unknown progressive neurologic disorder (possibly ALS or something similar). She has two paternal half-siblings. Her paternal half-brother  at age 66. He was morbidly obese and had heart problems. She is unsure about cholesterol. Her mother takes cholesterol medicine. Her father  at age 66 from congestive heart failure. He had coronary bypass surgery at age 55.         IMPRESSION: Ms. Collado is a 53-year-old female with hyperlipidemia and concern for familial hypercholesterolemia.     FH is the most common inherited cardiovascular disease. It is an autosomal dominant condition characterized by severely elevated LDL cholesterol (LDL-C)  levels that lead to atherosclerotic plaque deposition in the coronary arteries and proximal aorta at an early age. It leads to an increased risk for cardiovascular disease. Some individuals may develop xanthomas around the eyelids, tendons of the elbows, hands, knees, and feet. Left untreated, women are at a 30% risk for a coronary event by age 60 years and men are at 50% risk by age 50 years.     The 4 gene Familial Hypercholesterolemia (FH) panel from [facility] (APOB, LDLR, LDLRAP1, PCSK9) was ordered. We discussed that if Ms. Collado is positive, her first-degree relatives would be at 50% risk. We discussed that even if she is negative or has a variant of uncertain significance (VUS), her at-risk family members should have their cholesterol regularly screened. Because FH is relatively common in the general population, it is also possible to have homozygous FH (HoFH) in which there are two pathogenic variants on opposite alleles.     We will plan to follow-up once results return.     RECOMMENDATIONS:  Familial hypercholesterolemia panel to [facility]   Follow-up once results return     TIME SPENT: 30 minutes     Chani Kang, MPH, MS, [facility]  Genetic Counselor   [facility]    Terri Fabian M.D.                                                                                   Medical Geneticist                                                                                                               [facility]

## 2024-02-02 ENCOUNTER — PATIENT MESSAGE (OUTPATIENT)
Dept: GENETICS | Facility: CLINIC | Age: 54
End: 2024-02-02
Payer: COMMERCIAL

## 2024-02-05 ENCOUNTER — OFFICE VISIT (OUTPATIENT)
Dept: PSYCHIATRY | Facility: CLINIC | Age: 54
End: 2024-02-05
Payer: COMMERCIAL

## 2024-02-05 DIAGNOSIS — F41.0 GENERALIZED ANXIETY DISORDER WITH PANIC ATTACKS: Primary | ICD-10-CM

## 2024-02-05 DIAGNOSIS — F41.1 GENERALIZED ANXIETY DISORDER WITH PANIC ATTACKS: Primary | ICD-10-CM

## 2024-02-05 DIAGNOSIS — F33.1 MODERATE EPISODE OF RECURRENT MAJOR DEPRESSIVE DISORDER: ICD-10-CM

## 2024-02-05 PROCEDURE — 99999 PR PBB SHADOW E&M-EST. PATIENT-LVL I: CPT | Mod: PBBFAC,,, | Performed by: SOCIAL WORKER

## 2024-02-05 PROCEDURE — 1159F MED LIST DOCD IN RCRD: CPT | Mod: CPTII,S$GLB,, | Performed by: SOCIAL WORKER

## 2024-02-05 PROCEDURE — 90837 PSYTX W PT 60 MINUTES: CPT | Mod: S$GLB,,, | Performed by: SOCIAL WORKER

## 2024-02-05 NOTE — PROGRESS NOTES
Individual Psychotherapy (LCSW/PhD)  Isaias Collado,  2/5/2024    Site:  [facility]         Therapeutic Intervention: Met with patient for individual psychotherapy.    Chief complaint/reason for encounter: depression and anxiety     Interval history and content of current session: Pt reported she has been on the waist list for an Adhysteria allen, and she got the spot. She reported she rents her allen. PT reported she got involved in a pochino group through the Adhysteria group 1x month. Pt reported next week she is going to go to [place] to see her mom. At the end of May, she will be getting Danis to [place]. PT reported there is some sadness, but she is more stressed with transferring of his schooling and medical care. Pt reported she finds that she is rushing for no reason and working on slowing down. He continues to set boundaries with her neighbor. We processed Wild and his hx and the signs that he was abused before her mom adopted him. She denies SI, no HI or AVH.     Treatment plan:  Target symptoms: depression, anxiety   Why chosen therapy is appropriate versus another modality: relevant to diagnosis, patient responds to this modality, evidence based practice  Outcome monitoring methods: self-report, observation, checklist/rating scale  Therapeutic intervention type: insight oriented psychotherapy, behavior modifying psychotherapy, supportive psychotherapy    Risk parameters:  Patient reports no suicidal ideation  Patient reports no homicidal ideation  Patient reports no self-injurious behavior  Patient reports no violent behavior    Verbal deficits: None    Patient's response to intervention:  The patient's response to intervention is accepting, motivated.    Progress toward goals and other mental status changes:  The patient's progress toward goals is fair .    Diagnosis:     ICD-10-CM ICD-9-CM   1. Generalized anxiety disorder with panic attacks  F41.1 300.02    F41.0 300.01   2. Moderate episode of recurrent  major depressive disorder  F33.1 296.32             Plan: Pt plans to continue individual psychotherapy    Return to clinic: as scheduled    Length of Service (minutes): 60

## 2024-02-06 ENCOUNTER — OFFICE VISIT (OUTPATIENT)
Dept: INTERNAL MEDICINE | Facility: CLINIC | Age: 54
End: 2024-02-06
Payer: COMMERCIAL

## 2024-02-06 VITALS
OXYGEN SATURATION: 99 % | BODY MASS INDEX: 31.82 KG/M2 | HEART RATE: 94 BPM | SYSTOLIC BLOOD PRESSURE: 120 MMHG | DIASTOLIC BLOOD PRESSURE: 78 MMHG | HEIGHT: 66 IN | WEIGHT: 198 LBS

## 2024-02-06 DIAGNOSIS — F40.00 AGORAPHOBIA: ICD-10-CM

## 2024-02-06 DIAGNOSIS — Z01.89 VISIT FOR LABORATORY TEST: ICD-10-CM

## 2024-02-06 DIAGNOSIS — F41.0 GENERALIZED ANXIETY DISORDER WITH PANIC ATTACKS: ICD-10-CM

## 2024-02-06 DIAGNOSIS — K64.8 HEMORRHOID PROLAPSE: Primary | ICD-10-CM

## 2024-02-06 DIAGNOSIS — F41.1 GENERALIZED ANXIETY DISORDER WITH PANIC ATTACKS: ICD-10-CM

## 2024-02-06 PROCEDURE — 99214 OFFICE O/P EST MOD 30 MIN: CPT | Mod: S$GLB,,, | Performed by: FAMILY MEDICINE

## 2024-02-06 PROCEDURE — 3008F BODY MASS INDEX DOCD: CPT | Mod: CPTII,S$GLB,, | Performed by: FAMILY MEDICINE

## 2024-02-06 PROCEDURE — 1160F RVW MEDS BY RX/DR IN RCRD: CPT | Mod: CPTII,S$GLB,, | Performed by: FAMILY MEDICINE

## 2024-02-06 PROCEDURE — 3074F SYST BP LT 130 MM HG: CPT | Mod: CPTII,S$GLB,, | Performed by: FAMILY MEDICINE

## 2024-02-06 PROCEDURE — 99999 PR PBB SHADOW E&M-EST. PATIENT-LVL V: CPT | Mod: PBBFAC,,, | Performed by: FAMILY MEDICINE

## 2024-02-06 PROCEDURE — 1159F MED LIST DOCD IN RCRD: CPT | Mod: CPTII,S$GLB,, | Performed by: FAMILY MEDICINE

## 2024-02-06 PROCEDURE — 3078F DIAST BP <80 MM HG: CPT | Mod: CPTII,S$GLB,, | Performed by: FAMILY MEDICINE

## 2024-02-06 RX ORDER — HYDROCORTISONE ACETATE PRAMOXINE HCL 1; 1 G/100G; G/100G
CREAM TOPICAL 3 TIMES DAILY
Qty: 30 G | Refills: 1 | Status: SHIPPED | OUTPATIENT
Start: 2024-02-06

## 2024-02-06 RX ORDER — VENLAFAXINE HYDROCHLORIDE 75 MG/1
75 CAPSULE, EXTENDED RELEASE ORAL DAILY
Qty: 90 CAPSULE | Refills: 1 | Status: SHIPPED | OUTPATIENT
Start: 2024-02-06 | End: 2024-08-04

## 2024-02-06 NOTE — PROGRESS NOTES
Subjective:     Patient ID: Isaias Collado is a 53 y.o. female.   Chief Complaint: Follow-up (3 month) and Hemorrhoids    HPI:  Patient is due for annual exam and labs.    Genetics testing showed now genetic component to hyperlipidemia.    Has not been able to see ortho for shoulder yet.    Saw Gen Surg for hemorrhoid but nothing was visualized during that encounter. Says her hemorrhoid is worsened now and she can feel a lesion herself. Says she is having more bleeding than before. Says it still prolapses with every bowel movement and she has to push it back in every time. Using a donut pillow and topical lidocaine which has not been very helpful.    Colonoscopy scheduled for end . She would like to get it done sooner.            Review of Problems & History:  Patient Active Problem List   Diagnosis    PCOS (polycystic ovarian syndrome)    Prediabetes    Varicose veins of both lower extremities with pain    Venous insufficiency of both lower extremities    Generalized anxiety disorder with panic attacks    Moderate episode of recurrent major depressive disorder      Past Medical History:   Diagnosis Date    Adenomyosis internal 2016    Allergy to ACE inhibitors 2016    Basal cell carcinoma     Dysfunctional uterine bleeding 2016    Gestational diabetes     Hx of renal calculi     Hypertension     no problem since weight loss     Panic attacks     PCOS (polycystic ovarian syndrome) 2016    w/ insulin resistance    Rosacea     Squamous cell carcinoma of skin     Varicose vein of leg     laser and injection tx       Past Surgical History:   Procedure Laterality Date     SECTION      ;     HYSTERECTOMY      SKIN BIOPSY      lymphomatoid papulosis    TUBAL LIGATION Bilateral     VARICOSE VEIN SURGERY Right     laser and injection      Social History     Socioeconomic History    Marital status:    Tobacco Use    Smoking status: Former     Current packs/day: 0.00      Types: Cigarettes     Quit date: 2022     Years since quittin.8    Smokeless tobacco: Never   Substance and Sexual Activity    Alcohol use: Yes     Comment: socially     Drug use: No   Social History Narrative    ** Merged History Encounter **           Medication Review:    Current Outpatient Medications:     ALPRAZolam (XANAX) 0.25 MG tablet, Take 1 tablet (0.25 mg total) by mouth 2 (two) times daily as needed for Anxiety., Disp: 60 tablet, Rfl: 0    clobetasoL (TEMOVATE) 0.05 % cream, Apply 1 application topically 2 (two) times daily., Disp: , Rfl:     clobetasol 0.05% (TEMOVATE) 0.05 % Oint, AAA on the trunk and extremities BID x4-6 weeks then D/C. Restart PRN for flares, Disp: 60 g, Rfl: 2    estradioL (ESTRACE) 1 MG tablet, Take 1 mg by mouth., Disp: , Rfl:     FINACEA 15 % Foam, APPLY DAILY TO SKIN TO AFFECTED AREA EVERY DAY, Disp: , Rfl:     hydroCHLOROthiazide (HYDRODIURIL) 25 MG tablet, Take 25 mg by mouth once daily., Disp: , Rfl:     doxycycline (VIBRAMYCIN) 50 MG capsule, , Disp: , Rfl:     hydrocortisone (ANUSOL-HC) 2.5 % rectal cream, Place rectally 2 (two) times daily., Disp: 28 g, Rfl: 2    Lactobac no.41/Bifidobact no.7 (PROBIOTIC-10 ORAL), Take by mouth., Disp: , Rfl:     metFORMIN (GLUCOPHAGE) 500 MG tablet, Take 1 tablet (500 mg total) by mouth 2 (two) times daily with meals. (Patient not taking: Reported on 2024), Disp: 60 tablet, Rfl: 2    oxybutynin (DITROPAN) 5 MG Tab, Take 5 mg by mouth 3 (three) times daily., Disp: , Rfl:     pramoxine-hydrocortisone (PROCTOCREAM-HC) 1-1 % rectal cream, Place rectally 3 (three) times daily., Disp: 30 g, Rfl: 1    triamcinolone acetonide 0.5% (KENALOG) 0.5 % Crea, , Disp: , Rfl:     venlafaxine (EFFEXOR-XR) 75 MG 24 hr capsule, Take 1 capsule (75 mg total) by mouth once daily., Disp: 90 capsule, Rfl: 1     Review of Systems   Constitutional:  Negative for chills, fatigue and fever.   HENT:  Negative for nasal congestion, ear pain, postnasal  "drip and sore throat.    Eyes:  Negative for visual disturbance.   Respiratory:  Negative for cough, shortness of breath and wheezing.    Cardiovascular:  Negative for chest pain and palpitations.   Gastrointestinal:  Positive for rectal pain. Negative for abdominal pain, change in bowel habit, constipation, diarrhea, nausea and vomiting.   Genitourinary:  Negative for dysuria and hematuria.   Musculoskeletal:  Negative for back pain, leg pain and neck pain.   Neurological:  Negative for dizziness, numbness and headaches.   Hematological:  Negative for adenopathy.   Psychiatric/Behavioral:  Negative for dysphoric mood, sleep disturbance and suicidal ideas. The patient is not nervous/anxious.           Objective:      Vitals:    02/06/24 1451   BP: 120/78   BP Location: Left arm   Patient Position: Sitting   BP Method: Large (Manual)   Pulse: 94   SpO2: 99%   Weight: 89.8 kg (197 lb 15.6 oz)   Height: 5' 6" (1.676 m)      Physical Exam  Vitals and nursing note reviewed. Exam conducted with a chaperone present (JAMES Shaw).   Constitutional:       General: She is not in acute distress.     Appearance: Normal appearance. She is not ill-appearing.   HENT:      Head: Normocephalic and atraumatic.      Right Ear: Tympanic membrane, ear canal and external ear normal. There is no impacted cerumen.      Left Ear: Tympanic membrane, ear canal and external ear normal. There is no impacted cerumen.      Nose: Nose normal. No congestion or rhinorrhea.      Mouth/Throat:      Mouth: Mucous membranes are moist.      Pharynx: Oropharynx is clear. No oropharyngeal exudate or posterior oropharyngeal erythema.   Eyes:      General: No scleral icterus.        Right eye: No discharge.         Left eye: No discharge.      Conjunctiva/sclera: Conjunctivae normal.   Neck:      Thyroid: No thyroid mass, thyromegaly or thyroid tenderness.   Cardiovascular:      Rate and Rhythm: Normal rate and regular rhythm.      Pulses: Normal pulses. "      Heart sounds: Normal heart sounds. No murmur heard.     No friction rub. No gallop.   Pulmonary:      Effort: Pulmonary effort is normal. No respiratory distress.      Breath sounds: Normal breath sounds. No wheezing, rhonchi or rales.   Abdominal:      General: Abdomen is flat. Bowel sounds are normal. There is no distension.      Palpations: Abdomen is soft. There is no mass.      Tenderness: There is no abdominal tenderness. There is no guarding.   Genitourinary:     Rectum: Tenderness (Approx 5:30 position, no distinct lesion or mass) present. No mass or anal fissure. Normal anal tone.   Musculoskeletal:         General: No swelling or deformity. Normal range of motion.      Cervical back: Normal range of motion and neck supple. No tenderness.   Lymphadenopathy:      Cervical: No cervical adenopathy.   Skin:     General: Skin is warm and dry.   Neurological:      General: No focal deficit present.      Mental Status: She is alert and oriented to person, place, and time. Mental status is at baseline.      Gait: Gait normal.      Deep Tendon Reflexes: Reflexes normal.   Psychiatric:         Mood and Affect: Mood normal.         Behavior: Behavior normal.         Thought Content: Thought content normal.         Judgment: Judgment normal.           Assessment:       Problem List Items Addressed This Visit          Psychiatric    Generalized anxiety disorder with panic attacks    Relevant Medications    venlafaxine (EFFEXOR-XR) 75 MG 24 hr capsule     Other Visit Diagnoses       Hemorrhoid prolapse    -  Primary    Relevant Medications    pramoxine-hydrocortisone (PROCTOCREAM-HC) 1-1 % rectal cream    Other Relevant Orders    Ambulatory referral/consult to Colorectal Surgery    Visit for laboratory test        Relevant Orders    Comprehensive Metabolic Panel    CBC Auto Differential    Lipid Panel    Hemoglobin A1C    TSH    T4, Free    Agoraphobia        Relevant Medications    venlafaxine (EFFEXOR-XR) 75 MG 24  hr capsule              Plan:       1. Hemorrhoid prolapse  No prolapse visualized today, however pt was very uncomfortable during the exam  Ref to colorectal surgery for more focused eval - Gen Surg was unable to see anything either  Recommend proceeding with colonoscopy as well  Rx proctocream to see if that gives any relief  - Ambulatory referral/consult to Colorectal Surgery; Future  - pramoxine-hydrocortisone (PROCTOCREAM-HC) 1-1 % rectal cream; Place rectally 3 (three) times daily.  Dispense: 30 g; Refill: 1    2. Visit for laboratory test  Ordered for annual visit  - Comprehensive Metabolic Panel; Future  - CBC Auto Differential; Future  - Lipid Panel; Future  - Hemoglobin A1C; Future  - TSH; Future  - T4, Free; Future    3. Generalized anxiety disorder with panic attacks  Refill effexor, stable, treatment is effective  - venlafaxine (EFFEXOR-XR) 75 MG 24 hr capsule; Take 1 capsule (75 mg total) by mouth once daily.  Dispense: 90 capsule; Refill: 1    4. Agoraphobia  As above (#3)  - venlafaxine (EFFEXOR-XR) 75 MG 24 hr capsule; Take 1 capsule (75 mg total) by mouth once daily.  Dispense: 90 capsule; Refill: 1          Follow up in about 6 months (around 8/6/2024) for Annual Visit, Fasting labs.     Bhavik Ryan MD, FAAFP  Family Medicine Physician  [facility]  02/08/2024

## 2024-02-08 ENCOUNTER — OFFICE VISIT (OUTPATIENT)
Dept: SURGERY | Facility: CLINIC | Age: 54
End: 2024-02-08
Payer: COMMERCIAL

## 2024-02-08 VITALS
WEIGHT: 198.19 LBS | DIASTOLIC BLOOD PRESSURE: 93 MMHG | SYSTOLIC BLOOD PRESSURE: 156 MMHG | HEART RATE: 92 BPM | BODY MASS INDEX: 31.99 KG/M2

## 2024-02-08 DIAGNOSIS — K64.8 HEMORRHOID PROLAPSE: ICD-10-CM

## 2024-02-08 PROCEDURE — 3008F BODY MASS INDEX DOCD: CPT | Mod: CPTII,S$GLB,, | Performed by: NURSE PRACTITIONER

## 2024-02-08 PROCEDURE — 3080F DIAST BP >= 90 MM HG: CPT | Mod: CPTII,S$GLB,, | Performed by: NURSE PRACTITIONER

## 2024-02-08 PROCEDURE — 99204 OFFICE O/P NEW MOD 45 MIN: CPT | Mod: S$GLB,,, | Performed by: NURSE PRACTITIONER

## 2024-02-08 PROCEDURE — 1159F MED LIST DOCD IN RCRD: CPT | Mod: CPTII,S$GLB,, | Performed by: NURSE PRACTITIONER

## 2024-02-08 PROCEDURE — 1160F RVW MEDS BY RX/DR IN RCRD: CPT | Mod: CPTII,S$GLB,, | Performed by: NURSE PRACTITIONER

## 2024-02-08 PROCEDURE — 3077F SYST BP >= 140 MM HG: CPT | Mod: CPTII,S$GLB,, | Performed by: NURSE PRACTITIONER

## 2024-02-08 PROCEDURE — 99999 PR PBB SHADOW E&M-EST. PATIENT-LVL V: CPT | Mod: PBBFAC,,, | Performed by: NURSE PRACTITIONER

## 2024-02-08 RX ORDER — HYDROCORTISONE 25 MG/G
CREAM TOPICAL 2 TIMES DAILY
Qty: 28 G | Refills: 2 | Status: SHIPPED | OUTPATIENT
Start: 2024-02-08

## 2024-02-08 NOTE — PROGRESS NOTES
CRS Office Visit History and Physical    Referring Md:   Bhavik Ryan Md  [address] [place] [address]    SUBJECTIVE:     Chief Complaint: hemorrhoid    History of Present Illness:  The patient is new patient to this practice.   Course is as follows:  Patient is a 53 y.o. female presents with painful external hemorrhoid. Notes prolapse with every bm.  Usually will manually reduce when it prolapses, however was seen by 3 MDs and told they did not note any hemorrhoid, so allowed it to stay prolapsed prior to this appt.  Symptoms have been present for 6-7 yrs.  Has tried prep h.  Associated bleeding: yes, has noted an increase in bleeding in past 1 month  Previous anorectal procedures: No  confirms straining/prolonged time on toilet with bowel movements.  is currently taking fiber supplement or stool softener intermittently. Having 2-3 bm/week.   Blood thinners: No    Last Colonoscopy: none; negative Cologuard       Review of patient's allergies indicates:   Allergen Reactions    Lisinopril Other (See Comments)     Cough.    Erythromycin Other (See Comments)     Nausea and cold sweats    Morphine Itching    Adhesive Rash     Paper tape ok       Past Medical History:   Diagnosis Date    Adenomyosis internal 2016    Allergy to ACE inhibitors 2016    Basal cell carcinoma     Dysfunctional uterine bleeding 2016    Gestational diabetes     Hx of renal calculi     Hypertension     no problem since weight loss     Panic attacks     PCOS (polycystic ovarian syndrome) 2016    w/ insulin resistance    Rosacea     Squamous cell carcinoma of skin     Varicose vein of leg     laser and injection tx      Past Surgical History:   Procedure Laterality Date     SECTION      ;     HYSTERECTOMY      SKIN BIOPSY      lymphomatoid papulosis    TUBAL LIGATION Bilateral     VARICOSE VEIN SURGERY Right     laser and injection     Family History   Problem Relation Age of Onset    Heart  failure Father     Heart disease Father     Diabetes Mother     Coronary artery disease Father     Diabetes Father      Social History     Tobacco Use    Smoking status: Former     Current packs/day: 0.00     Types: Cigarettes     Quit date: 2022     Years since quittin.8    Smokeless tobacco: Never   Substance Use Topics    Alcohol use: Yes     Comment: socially     Drug use: No        Review of Systems:  Review of Systems   Gastrointestinal:  Positive for constipation.        Anal pain  Hemorrhoid prolapse       OBJECTIVE:     Vital Signs (Most Recent)  Blood Pressure (Abnormal) 156/93 Comment: pt in pain  Pulse 92   Weight 89.9 kg (198 lb 3.1 oz)   Last Menstrual Period 2016   Body Mass Index 31.99 kg/m²     Physical Exam:  General:    White   or  female in no distress   Neuro: Alert and oriented to person, place, and time.  Moves all extremities.     HEENT: No icterus.  Trachea midline  Respiratory: Respirations are even and unlabored, no cough or audible wheezing  Skin: Warm dry and intact, No visible rashes, no jaundice    Labs reviewed today:  Lab Results   Component Value Date    WBC 6.68 2023    HGB 12.5 2023    HCT 40.3 2023     2023    CHOL 302 (H) 2023    TRIG 200 (H) 2023    HDL 67 2023    ALT 19 2023    AST 20 2023     2023    K 3.5 2023     2023    CREATININE 0.8 2023    BUN 11 2023    CO2 31 (H) 2023    TSH 1.460 2023    HGBA1C 5.7 (H) 2023       Imaging reviewed today:  none    Endoscopy reviewed today:  none    Anorectal Exam:    Anal Skin:     - edematous prolapsed hemorrhoid  - able to get to reduce in size in clinic.    ASSESSMENT/PLAN:     Diagnoses and all orders for this visit:    Hemorrhoid prolapse  -     Ambulatory referral/consult to Colorectal Surgery  -     hydrocortisone (ANUSOL-HC) 2.5 % rectal cream; Place rectally 2  (two) times daily.        The patient was instructed to:  Anusol bid for 2 weeks  Lidocaine prn pain  Manually reduce if able  F/u with MD next week to discuss surgery.        PARKER oLzanoP-DANNI  Colon and Rectal Surgery

## 2024-02-08 NOTE — PATIENT INSTRUCTIONS
Anusol cream 2x/day for up to 2 weeks.   Use externally/internally once able to.  Daily miralax. If stools become too loose, can decrease to 1/2 capful per day.  Lidocaine as needed for pain.  No donut cushion.  Full seat cushion.  Follow up with Dr. Nicholson next Wednesday to discuss surgery/colonoscopy

## 2024-02-14 ENCOUNTER — OFFICE VISIT (OUTPATIENT)
Dept: SURGERY | Facility: CLINIC | Age: 54
End: 2024-02-14
Payer: COMMERCIAL

## 2024-02-14 ENCOUNTER — LAB VISIT (OUTPATIENT)
Dept: LAB | Facility: HOSPITAL | Age: 54
End: 2024-02-14
Attending: COLON & RECTAL SURGERY
Payer: COMMERCIAL

## 2024-02-14 ENCOUNTER — HOSPITAL ENCOUNTER (OUTPATIENT)
Dept: CARDIOLOGY | Facility: CLINIC | Age: 54
Discharge: HOME | End: 2024-02-14
Payer: COMMERCIAL

## 2024-02-14 VITALS
SYSTOLIC BLOOD PRESSURE: 139 MMHG | HEIGHT: 66 IN | BODY MASS INDEX: 31.89 KG/M2 | HEART RATE: 81 BPM | DIASTOLIC BLOOD PRESSURE: 86 MMHG | WEIGHT: 198.44 LBS

## 2024-02-14 DIAGNOSIS — K64.8 HEMORRHOID PROLAPSE: ICD-10-CM

## 2024-02-14 DIAGNOSIS — K64.8 HEMORRHOID PROLAPSE: Primary | ICD-10-CM

## 2024-02-14 LAB
ANION GAP SERPL CALC-SCNC: 8 MMOL/L (ref 8–16)
BASOPHILS # BLD AUTO: 0.05 K/UL (ref 0–0.2)
BASOPHILS NFR BLD: 0.8 % (ref 0–1.9)
BUN SERPL-MCNC: 13 MG/DL (ref 6–20)
CALCIUM SERPL-MCNC: 8.9 MG/DL (ref 8.7–10.5)
CHLORIDE SERPL-SCNC: 105 MMOL/L (ref 95–110)
CO2 SERPL-SCNC: 26 MMOL/L (ref 23–29)
CREAT SERPL-MCNC: 0.7 MG/DL (ref 0.5–1.4)
DIFFERENTIAL METHOD BLD: ABNORMAL
EOSINOPHIL # BLD AUTO: 0.1 K/UL (ref 0–0.5)
EOSINOPHIL NFR BLD: 1.7 % (ref 0–8)
ERYTHROCYTE [DISTWIDTH] IN BLOOD BY AUTOMATED COUNT: 14.9 % (ref 11.5–14.5)
EST. GFR  (NO RACE VARIABLE): >60 ML/MIN/1.73 M^2
GLUCOSE SERPL-MCNC: 83 MG/DL (ref 70–110)
HCT VFR BLD AUTO: 37.5 % (ref 37–48.5)
HGB BLD-MCNC: 11.8 G/DL (ref 12–16)
IMM GRANULOCYTES # BLD AUTO: 0.01 K/UL (ref 0–0.04)
IMM GRANULOCYTES NFR BLD AUTO: 0.2 % (ref 0–0.5)
LYMPHOCYTES # BLD AUTO: 2.5 K/UL (ref 1–4.8)
LYMPHOCYTES NFR BLD: 39.2 % (ref 18–48)
MCH RBC QN AUTO: 28.6 PG (ref 27–31)
MCHC RBC AUTO-ENTMCNC: 31.5 G/DL (ref 32–36)
MCV RBC AUTO: 91 FL (ref 82–98)
MONOCYTES # BLD AUTO: 0.5 K/UL (ref 0.3–1)
MONOCYTES NFR BLD: 7.7 % (ref 4–15)
NEUTROPHILS # BLD AUTO: 3.3 K/UL (ref 1.8–7.7)
NEUTROPHILS NFR BLD: 50.4 % (ref 38–73)
NRBC BLD-RTO: 0 /100 WBC
PLATELET # BLD AUTO: 234 K/UL (ref 150–450)
PMV BLD AUTO: 10.3 FL (ref 9.2–12.9)
POTASSIUM SERPL-SCNC: 3.6 MMOL/L (ref 3.5–5.1)
RBC # BLD AUTO: 4.12 M/UL (ref 4–5.4)
SODIUM SERPL-SCNC: 139 MMOL/L (ref 136–145)
WBC # BLD AUTO: 6.46 K/UL (ref 3.9–12.7)

## 2024-02-14 PROCEDURE — 1159F MED LIST DOCD IN RCRD: CPT | Mod: CPTII,S$GLB,, | Performed by: COLON & RECTAL SURGERY

## 2024-02-14 PROCEDURE — 99999 PR PBB SHADOW E&M-EST. PATIENT-LVL V: CPT | Mod: PBBFAC,,, | Performed by: COLON & RECTAL SURGERY

## 2024-02-14 PROCEDURE — 36415 COLL VENOUS BLD VENIPUNCTURE: CPT | Performed by: COLON & RECTAL SURGERY

## 2024-02-14 PROCEDURE — 3075F SYST BP GE 130 - 139MM HG: CPT | Mod: CPTII,S$GLB,, | Performed by: COLON & RECTAL SURGERY

## 2024-02-14 PROCEDURE — 93005 ELECTROCARDIOGRAM TRACING: CPT | Mod: S$GLB,,, | Performed by: COLON & RECTAL SURGERY

## 2024-02-14 PROCEDURE — 93010 ELECTROCARDIOGRAM REPORT: CPT | Mod: S$GLB,,, | Performed by: INTERNAL MEDICINE

## 2024-02-14 PROCEDURE — 3008F BODY MASS INDEX DOCD: CPT | Mod: CPTII,S$GLB,, | Performed by: COLON & RECTAL SURGERY

## 2024-02-14 PROCEDURE — 85025 COMPLETE CBC W/AUTO DIFF WBC: CPT | Performed by: COLON & RECTAL SURGERY

## 2024-02-14 PROCEDURE — 3079F DIAST BP 80-89 MM HG: CPT | Mod: CPTII,S$GLB,, | Performed by: COLON & RECTAL SURGERY

## 2024-02-14 PROCEDURE — 46600 DIAGNOSTIC ANOSCOPY SPX: CPT | Mod: S$GLB,,, | Performed by: COLON & RECTAL SURGERY

## 2024-02-14 PROCEDURE — 99213 OFFICE O/P EST LOW 20 MIN: CPT | Mod: 25,S$GLB,, | Performed by: COLON & RECTAL SURGERY

## 2024-02-14 PROCEDURE — 1160F RVW MEDS BY RX/DR IN RCRD: CPT | Mod: CPTII,S$GLB,, | Performed by: COLON & RECTAL SURGERY

## 2024-02-14 PROCEDURE — 80048 BASIC METABOLIC PNL TOTAL CA: CPT | Performed by: COLON & RECTAL SURGERY

## 2024-02-14 NOTE — H&P (VIEW-ONLY)
CRS Office Visit    SUBJECTIVE:     Chief Complaint: Hemorrhoids    History of Present Illness:  Patient is a 53 y.o. female presents with for evaluation of hemorrhoids. Ms. Collado states she has been having ongoing issues since  with what she believes to be a hemorrhoid. She first noticed swelling and prolapsed tissue from her bottom after a long drive. Since then she has worsening of symptoms with discomfort, regular prolapsing with each bowel movement and bleeding with nearly every bowel movement. Nothing seems to help. She is able to reduce her the prolapse. She has bowel movements every 2-3 days. Bowel movements are mostly soft in nature. She does require an enema or laxative approximately once a month to assist with bowel movements. She denies weight loss, fever, chills, or night sweats    She has never had a colonoscopy. She had a cologuard test a few years ago who was negative  No Fhx of colon cancer or IBD    Review of patient's allergies indicates:   Allergen Reactions    Lisinopril Other (See Comments)     Cough.    Erythromycin Other (See Comments)     Nausea and cold sweats    Morphine Itching    Adhesive Rash     Paper tape ok       Past Medical History:   Diagnosis Date    Adenomyosis internal 2016    Allergy to ACE inhibitors 2016    Basal cell carcinoma     Dysfunctional uterine bleeding 2016    Gestational diabetes     Hx of renal calculi     Hypertension     no problem since weight loss     Panic attacks     PCOS (polycystic ovarian syndrome) 2016    w/ insulin resistance    Rosacea     Squamous cell carcinoma of skin     Varicose vein of leg     laser and injection tx      Past Surgical History:   Procedure Laterality Date     SECTION      ;     HYSTERECTOMY      SKIN BIOPSY      lymphomatoid papulosis    TUBAL LIGATION Bilateral     VARICOSE VEIN SURGERY Right     laser and injection     Family History   Problem Relation Age of Onset    Heart failure  Father     Heart disease Father     Diabetes Mother     Coronary artery disease Father     Diabetes Father      Social History     Tobacco Use    Smoking status: Former     Current packs/day: 0.00     Types: Cigarettes     Quit date: 2022     Years since quittin.8    Smokeless tobacco: Never   Substance Use Topics    Alcohol use: Yes     Comment: socially     Drug use: No        Review of Systems:  Constitutional: no fever or chills  Respiratory: no cough or shortness of breath  Cardiovascular: no chest pain or palpitations  Gastrointestinal: no nausea or vomiting, tolerating diet, positive for blood per rectum, constipation, prolapsing tissue  Musculoskeletal: no arthralgias or myalgias  Neurological: no seizures or tremors    OBJECTIVE:     Vital Signs (Most Recent)  Pulse: 81 (24 1430)  BP: 139/86 (24 1430)    Physical Exam:  General:    White   or  female in NAD sitting in chair in clinic  Neuro: aaox4 maex4 perrl  Respiratory: resps even unlabored  Cardiac: cap refill <2 sec  Abdomen: Abdomen soft, nontender. BS normal. No masses, organomegaly or scars.  Extremities: Warm dry and intact  Anorectal: palpable soft mobile lesion in left posterolateral region  Anoscopy: large polypoid tissue in left posterior position consistent with polyp vs large hemorrhoid    ASSESSMENT/PLAN:     Diagnoses and all orders for this visit:    Hemorrhoid prolapse  -     Case Request Operating Room: EXAM UNDER ANESTHESIA  EXCISION ANAL LESION  -     CBC Auto Differential; Future  -     Basic Metabolic Panel; Future  -     EKG 12-lead; Future     Will plan for EUA to better evaluate findings on anoscopy, will plan for hemorrhoidectomy vs polypectomy depending on findings  Discussed risks, benefits and alternatives to surgery and patient is agreeable with going forward with the procedure.     Enrique Lazo MD  CRS Fellow    I have interviewed and examined the patient, reviewed the  notes and assessments, and/or personally supervised the procedure(s) performed by Dr Lazo, and I concur with her/his documentation of Isaias Collado.  See below addendum for my evaluation and additional findings.    Anoscopy (personally supervised by me) shows a large polypoid lesion in left posterior position - not clear if this is a polyp or a large internal hemorrhoid - examination was somewhat limited by patient discomfort.    Will proceed with EUA to further evaluate this lesion and either perform excisional hemorrhoidectomy or polypectomy based on what is identified at the time of surgery.  Surgery scheduled for 02/28/2024.    I have discussed the procedure at length with Isaias Collado.  We discussed the rationale, risks, benefits, and alternatives in depth.  We discussed the expected outcomes and potential complications including but not limited to  delayed wound anal, bleeding, infection, recurrence, prolonged pain, need for further procedures, and altered continence.  She verbalized her understanding of the procedure and wishes to proceed.  Written consent was obtained.      Wild Nicholson MD, FACS, FASCRS  , Department of Colon & Rectal Surgery

## 2024-02-15 LAB
OHS QRS DURATION: 84 MS
OHS QTC CALCULATION: 426 MS

## 2024-02-21 NOTE — ANESTHESIA PAT ROS NOTE
02/21/2024  Isaias Collado is a 53 y.o., female.      Pre-op Assessment          Review of Systems  Anesthesia Hx:  No problems with previous Anesthesia   History of prior surgery of interest to airway management or planning:  Previous anesthesia: General HYSTERECTOMY-ABDOMINAL-TOTAL (ANIL) (Abdomen)  PANNICULECTOMY (Abdomen)  SALPINGECTOMY (Bilateral: Abdomen)  9/6/16 with general anesthesia.  Procedure performed at an [facility].      Airway issues documented on chart review include mask, easy, easy direct laryngoscopy , view on direct laryngoscopy Grade I    Denies Family Hx of Anesthesia complications.    Denies Personal Hx of Anesthesia complications.                    Social:  Former Smoker, No Alcohol Use       Hematology/Oncology:  Hematology Normal                       --  Cancer in past history (SKIN):                     EENT/Dental:  EENT/Dental Normal           Cardiovascular:     Hypertension       Denies Angina.          Functional Capacity good / => 4 METS                         Pulmonary:       Denies Shortness of breath.  Denies Recent URI.                 Renal/:   renal calculi               Hepatic/GI:        HEMORRHOID PROLAPSE          Musculoskeletal:  Musculoskeletal Normal                Neurological:  Neurology Normal                                      Endocrine:        Obesity / BMI > 30  Dermatological:  ROSACEA   Psych:  Psychiatric History                       Anesthesia Assessment: Preoperative EQUATION    Planned Procedure: Procedure(s) (LRB):  EXAM UNDER ANESTHESIA EXCISION ANAL LESION (N/A)  Requested Anesthesia Type:Choice  Surgeon: Wild Nicholson MD  Service: Colon and Rectal  Known or anticipated Date of Surgery:2/28/2024    Surgeon notes: reviewed    Electronic QUestionnaire Assessment completed via nurse interview with patient.        Triage considerations:      The patient has no apparent active cardiac condition (No unstable coronary Syndrome such as severe unstable angina or recent [<1 month] myocardial infarction, decompensated CHF, severe valvular   disease or significant arrhythmia)    Previous anesthesia records:GETA and No problems    Airway/Jaw/Neck:  Airway Findings: Mouth Opening: Normal Tongue: Normal  General Airway Assessment: Adult  Mallampati: II  TM Distance: Normal, at least 6 cm  Jaw/Neck Findings:  Neck ROM: Normal ROM    Eyes/Ears/Nose:  Eyes/Ears/Nose Findings:  Dental:  Dental Findings: In tact, upper front caps     Airway Present Prior to Hospital Arrival?: No Placement Date: 09/06/16 Placement Time: 1043 Method of Intubation: Direct laryngoscopy Inserted by: CRNA Airway Device: Endotracheal Tube Mask Ventilation: Easy Intubated: Postinduction Blade: Luciano #2 Airway Device Size: 7.5 Style: Cuffed Cuff Inflation: Minimal occlusive pressure Inflation Amount (mL): 6 Placement Verified By: Auscultation;Capnometry Grade: Grade I Complicating Factors: None Findings Post-Intubation: Positive EtCO2;Bilateral breath sounds;Atraumatic/Condition of teeth unchanged Depth of Insertion (cm): 22 Secured at: Lips Complications: None Breath Sounds: Equal Bilateral Insertion attempts (enter comment if more than 2 attempts): 1 Removal Date: 09/06/16        Last PCP note: within 1 month , within [facility]   Subspecialty notes: Dermatology, Psychiatry, CRS    Other important co-morbidities: HTN and Obesity      Tests already available:  Available tests,  within 3 months , within [facility] .     2/14/24  BMP, CBC, EKG            Instructions given. (See in Nurse's note)    Optimization:  Anesthesia Preop Clinic Assessment  NOT Indicated    Medical Opinion NOT Indicated              Plan:    Testing:  None Needed        Patient  has previously scheduled Medical Appointment: NOT AT THIS TIME    Navigation: Straight Line to surgery.               No tests, anesthesia preop  clinic visit, or consult required.

## 2024-02-26 LAB
GENETIC COUNSELING?: YES
GENSO SPECIMEN TYPE: NORMAL
MISCELLANEOUS GENETIC TEST NAME: NORMAL
PARTENTAL OR SIBLING TESTING?: NO
REFERENCE LAB: NORMAL
TEST RESULT: NORMAL

## 2024-02-27 ENCOUNTER — ANESTHESIA EVENT (OUTPATIENT)
Dept: SURGERY | Facility: HOSPITAL | Age: 54
End: 2024-02-27
Payer: COMMERCIAL

## 2024-02-27 ENCOUNTER — TELEPHONE (OUTPATIENT)
Dept: SURGERY | Facility: CLINIC | Age: 54
End: 2024-02-27
Payer: COMMERCIAL

## 2024-02-27 ENCOUNTER — PATIENT MESSAGE (OUTPATIENT)
Dept: GENETICS | Facility: CLINIC | Age: 54
End: 2024-02-27
Payer: COMMERCIAL

## 2024-02-28 ENCOUNTER — HOSPITAL ENCOUNTER (OUTPATIENT)
Facility: HOSPITAL | Age: 54
Discharge: HOME | End: 2024-02-28
Attending: COLON & RECTAL SURGERY | Admitting: COLON & RECTAL SURGERY
Payer: COMMERCIAL

## 2024-02-28 ENCOUNTER — ANESTHESIA (OUTPATIENT)
Dept: SURGERY | Facility: HOSPITAL | Age: 54
End: 2024-02-28
Payer: COMMERCIAL

## 2024-02-28 VITALS
HEART RATE: 90 BPM | HEIGHT: 66 IN | DIASTOLIC BLOOD PRESSURE: 57 MMHG | RESPIRATION RATE: 16 BRPM | SYSTOLIC BLOOD PRESSURE: 104 MMHG | WEIGHT: 198 LBS | OXYGEN SATURATION: 93 % | BODY MASS INDEX: 31.82 KG/M2 | TEMPERATURE: 98 F

## 2024-02-28 DIAGNOSIS — K62.9 ANAL LESION: ICD-10-CM

## 2024-02-28 PROCEDURE — 37000009 HC ANESTHESIA EA ADD 15 MINS: Performed by: COLON & RECTAL SURGERY

## 2024-02-28 PROCEDURE — D9220A PRA ANESTHESIA: Mod: ANES,,, | Performed by: ANESTHESIOLOGY

## 2024-02-28 PROCEDURE — 25000003 PHARM REV CODE 250: Performed by: NURSE ANESTHETIST, CERTIFIED REGISTERED

## 2024-02-28 PROCEDURE — 36000704 HC OR TIME LEV I 1ST 15 MIN: Performed by: COLON & RECTAL SURGERY

## 2024-02-28 PROCEDURE — 36000706: Performed by: COLON & RECTAL SURGERY

## 2024-02-28 PROCEDURE — 88307 TISSUE EXAM BY PATHOLOGIST: CPT | Performed by: PATHOLOGY

## 2024-02-28 PROCEDURE — 36000707: Performed by: COLON & RECTAL SURGERY

## 2024-02-28 PROCEDURE — 88305 TISSUE EXAM BY PATHOLOGIST: CPT | Mod: 26,,, | Performed by: PATHOLOGY

## 2024-02-28 PROCEDURE — 71000015 HC POSTOP RECOV 1ST HR: Performed by: COLON & RECTAL SURGERY

## 2024-02-28 PROCEDURE — 71000044 HC DOSC ROUTINE RECOVERY FIRST HOUR: Performed by: COLON & RECTAL SURGERY

## 2024-02-28 PROCEDURE — 63600175 PHARM REV CODE 636 W HCPCS: Performed by: ANESTHESIOLOGY

## 2024-02-28 PROCEDURE — D9220A PRA ANESTHESIA: Mod: CRNA,,, | Performed by: NURSE ANESTHETIST, CERTIFIED REGISTERED

## 2024-02-28 PROCEDURE — 46922 EXCISION OF ANAL LESION(S): CPT | Mod: ,,, | Performed by: COLON & RECTAL SURGERY

## 2024-02-28 PROCEDURE — 36000705 HC OR TIME LEV I EA ADD 15 MIN: Performed by: COLON & RECTAL SURGERY

## 2024-02-28 PROCEDURE — 63600175 PHARM REV CODE 636 W HCPCS: Performed by: NURSE ANESTHETIST, CERTIFIED REGISTERED

## 2024-02-28 PROCEDURE — 37000008 HC ANESTHESIA 1ST 15 MINUTES: Performed by: COLON & RECTAL SURGERY

## 2024-02-28 PROCEDURE — 25000003 PHARM REV CODE 250: Performed by: COLON & RECTAL SURGERY

## 2024-02-28 PROCEDURE — 25000003 PHARM REV CODE 250: Performed by: NURSE PRACTITIONER

## 2024-02-28 RX ORDER — LIDOCAINE HYDROCHLORIDE 10 MG/ML
1 INJECTION, SOLUTION EPIDURAL; INFILTRATION; INTRACAUDAL; PERINEURAL ONCE
Status: DISCONTINUED | OUTPATIENT
Start: 2024-02-28 | End: 2024-02-28 | Stop reason: HOSPADM

## 2024-02-28 RX ORDER — DROPERIDOL 2.5 MG/ML
0.62 INJECTION, SOLUTION INTRAMUSCULAR; INTRAVENOUS ONCE AS NEEDED
Status: DISCONTINUED | OUTPATIENT
Start: 2024-02-28 | End: 2024-02-28 | Stop reason: HOSPADM

## 2024-02-28 RX ORDER — PROPOFOL 10 MG/ML
VIAL (ML) INTRAVENOUS
Status: DISCONTINUED | OUTPATIENT
Start: 2024-02-28 | End: 2024-02-28

## 2024-02-28 RX ORDER — PHENYLEPHRINE HCL IN 0.9% NACL 1 MG/10 ML
SYRINGE (ML) INTRAVENOUS
Status: DISCONTINUED | OUTPATIENT
Start: 2024-02-28 | End: 2024-02-28

## 2024-02-28 RX ORDER — ONDANSETRON HYDROCHLORIDE 2 MG/ML
INJECTION, SOLUTION INTRAVENOUS
Status: DISCONTINUED | OUTPATIENT
Start: 2024-02-28 | End: 2024-02-28

## 2024-02-28 RX ORDER — OXYCODONE HYDROCHLORIDE 5 MG/1
5 TABLET ORAL EVERY 6 HOURS PRN
Qty: 20 TABLET | Refills: 0 | Status: SHIPPED | OUTPATIENT
Start: 2024-02-28

## 2024-02-28 RX ORDER — LIDOCAINE HYDROCHLORIDE 10 MG/ML
INJECTION INFILTRATION; PERINEURAL
Status: DISCONTINUED | OUTPATIENT
Start: 2024-02-28 | End: 2024-02-28 | Stop reason: HOSPADM

## 2024-02-28 RX ORDER — OXYCODONE HYDROCHLORIDE 5 MG/1
5 TABLET ORAL ONCE
Status: DISCONTINUED | OUTPATIENT
Start: 2024-02-28 | End: 2024-02-28 | Stop reason: HOSPADM

## 2024-02-28 RX ORDER — BUPIVACAINE HYDROCHLORIDE AND EPINEPHRINE 5; 5 MG/ML; UG/ML
INJECTION, SOLUTION EPIDURAL; INTRACAUDAL; PERINEURAL
Status: DISCONTINUED | OUTPATIENT
Start: 2024-02-28 | End: 2024-02-28 | Stop reason: HOSPADM

## 2024-02-28 RX ORDER — MIDAZOLAM HYDROCHLORIDE 1 MG/ML
INJECTION, SOLUTION INTRAMUSCULAR; INTRAVENOUS
Status: DISCONTINUED | OUTPATIENT
Start: 2024-02-28 | End: 2024-02-28

## 2024-02-28 RX ORDER — FENTANYL CITRATE 50 UG/ML
INJECTION, SOLUTION INTRAMUSCULAR; INTRAVENOUS
Status: DISCONTINUED | OUTPATIENT
Start: 2024-02-28 | End: 2024-02-28

## 2024-02-28 RX ORDER — DEXAMETHASONE SODIUM PHOSPHATE 4 MG/ML
INJECTION, SOLUTION INTRA-ARTICULAR; INTRALESIONAL; INTRAMUSCULAR; INTRAVENOUS; SOFT TISSUE
Status: DISCONTINUED | OUTPATIENT
Start: 2024-02-28 | End: 2024-02-28

## 2024-02-28 RX ORDER — ONDANSETRON HYDROCHLORIDE 2 MG/ML
4 INJECTION, SOLUTION INTRAVENOUS ONCE AS NEEDED
Status: DISCONTINUED | OUTPATIENT
Start: 2024-02-28 | End: 2024-02-28 | Stop reason: HOSPADM

## 2024-02-28 RX ORDER — HYDROMORPHONE HYDROCHLORIDE 1 MG/ML
0.2 INJECTION, SOLUTION INTRAMUSCULAR; INTRAVENOUS; SUBCUTANEOUS EVERY 5 MIN PRN
Status: DISCONTINUED | OUTPATIENT
Start: 2024-02-28 | End: 2024-02-28 | Stop reason: HOSPADM

## 2024-02-28 RX ORDER — ROCURONIUM BROMIDE 10 MG/ML
INJECTION, SOLUTION INTRAVENOUS
Status: DISCONTINUED | OUTPATIENT
Start: 2024-02-28 | End: 2024-02-28

## 2024-02-28 RX ORDER — SODIUM CHLORIDE 9 MG/ML
INJECTION, SOLUTION INTRAVENOUS CONTINUOUS
Status: DISCONTINUED | OUTPATIENT
Start: 2024-02-28 | End: 2024-02-28 | Stop reason: HOSPADM

## 2024-02-28 RX ORDER — MUPIROCIN 20 MG/G
OINTMENT TOPICAL
Status: DISCONTINUED | OUTPATIENT
Start: 2024-02-28 | End: 2024-02-28 | Stop reason: HOSPADM

## 2024-02-28 RX ORDER — LIDOCAINE HYDROCHLORIDE 20 MG/ML
INJECTION, SOLUTION EPIDURAL; INFILTRATION; INTRACAUDAL; PERINEURAL
Status: DISCONTINUED | OUTPATIENT
Start: 2024-02-28 | End: 2024-02-28

## 2024-02-28 RX ADMIN — LIDOCAINE HYDROCHLORIDE 100 MG: 20 INJECTION, SOLUTION EPIDURAL; INFILTRATION; INTRACAUDAL; PERINEURAL at 08:02

## 2024-02-28 RX ADMIN — PROPOFOL 200 MG: 10 INJECTION, EMULSION INTRAVENOUS at 08:02

## 2024-02-28 RX ADMIN — SUGAMMADEX 200 MG: 100 INJECTION, SOLUTION INTRAVENOUS at 08:02

## 2024-02-28 RX ADMIN — HYDROMORPHONE HYDROCHLORIDE 0.2 MG: 1 INJECTION, SOLUTION INTRAMUSCULAR; INTRAVENOUS; SUBCUTANEOUS at 09:02

## 2024-02-28 RX ADMIN — Medication 200 MCG: at 08:02

## 2024-02-28 RX ADMIN — ROCURONIUM BROMIDE 50 MG: 10 INJECTION, SOLUTION INTRAVENOUS at 08:02

## 2024-02-28 RX ADMIN — MIDAZOLAM HYDROCHLORIDE 2 MG: 1 INJECTION, SOLUTION INTRAMUSCULAR; INTRAVENOUS at 07:02

## 2024-02-28 RX ADMIN — MUPIROCIN: 20 OINTMENT TOPICAL at 06:02

## 2024-02-28 RX ADMIN — DEXAMETHASONE SODIUM PHOSPHATE 4 MG: 4 INJECTION, SOLUTION INTRAMUSCULAR; INTRAVENOUS at 08:02

## 2024-02-28 RX ADMIN — FENTANYL CITRATE 100 MCG: 50 INJECTION, SOLUTION INTRAMUSCULAR; INTRAVENOUS at 08:02

## 2024-02-28 RX ADMIN — ONDANSETRON 4 MG: 2 INJECTION INTRAMUSCULAR; INTRAVENOUS at 08:02

## 2024-02-28 RX ADMIN — SODIUM CHLORIDE: 0.9 INJECTION, SOLUTION INTRAVENOUS at 07:02

## 2024-02-28 NOTE — ANESTHESIA PREPROCEDURE EVALUATION
02/28/2024  Isaias Collado is a 53 y.o., female.      Pre-op Assessment          Review of Systems  Anesthesia Hx:  No problems with previous Anesthesia                Cardiovascular:      Denies Hypertension.    Denies CAD.                                        Pulmonary:     Denies Asthma.     Denies Sleep Apnea.                Renal/:   Denies Chronic Renal Disease.                Hepatic/GI:   Denies PUD.   Denies GERD. Denies Liver Disease.            Neurological:    Denies CVA.    Denies Seizures.                                Endocrine:  Denies Diabetes. Denies Hypothyroidism.                 Anesthesia Plan  Type of Anesthesia, risks & benefits discussed:    Anesthesia Type: Gen ETT  Intra-op Monitoring Plan: Standard ASA Monitors  Post Op Pain Control Plan: multimodal analgesia and IV/PO Opioids PRN  Induction:  IV  Airway Plan: Direct  Informed Consent: Informed consent signed with the Patient and all parties understand the risks and agree with anesthesia plan.  All questions answered.   ASA Score: 1    Ready For Surgery From Anesthesia Perspective.     .

## 2024-02-28 NOTE — OP NOTE
DATE OF PROCEDURE: 2/28/2024     PREOPERATIVE DIAGNOSES: Anal canal mass     POSTOPERATIVE DIAGNOSES: Anal canal mass    PROCEDURES PERFORMED: Examination under anesthesia and excision of anal canal mass     SURGEON:  Wild Nicholson M.D.     ASSISTANT: Enrique Lazo M.D. [RES]     ANESTHESIA:  General endotracheal     IV FLUIDS:  500 mL of crystalloid.     ESTIMATED BLOOD LOSS:  <10 mL.     DRAINS: None     SPECIMENS: Anal canal mass    COMPLICATIONS:  None.     OPERATIVE FINDINGS: 3-cm soft mobile anterior midline anal canal mass - pedunculated on a broad-based stalk, prolapses easily, +fissure along right lateral aspect     INDICATIONS:  The patient is a 53-year-old female who presented with complaints of anal pain & bleeding as well as a prolapsing mass.  On anoscopy in the office it was not clear of this was  a prolapsing hemorrhoid or mass lesion.  She is being brought to the Operating Room today for examination under anesthesia and excision of the lesion.     DESCRIPTION OF PROCEDURE:  The patient was identified, brought to the Operating Room and placed on the stretcher in a supine position after obtaining informed consent.  Venous sequential stockings were placed.  After induction of general endotracheal anesthesia, she was repositioned on the operating table in a prone position using chest rolls and adequate padding of all pressure points.  The table was jackknifed and the buttocks were taped apart to efface the anal verge.  The perianal region was prepped and draped in the usual sterile fashion.      Inspection of the anal verge and anal margin was grossly normal.  An anal block was performed using a combination of 1% lidocaine with epinephrine and 0.25% bupivacaine.  Digital rectal exam revealed a large mobile lesion in the distal anal canal.  We evaluated the anal canal with a Hill-Bhatti retractor.  There was a 3 cm, soft, mobile mass lesion in the anterior midline.  This was pedunculated on a  broad-based stalk and was able to be easily prolapsed outside of the anal canal.  There was a fissure along the right lateral aspect of this.  It did not appear to be fixed to the underlying sphincter musculature.  We excised the lesion in an elliptical fashion starting the incision on the anal margin and extending it inward to the anal canal around the broad-based stalk.  The fissure along the right lateral aspect was incorporated in the incision line.  The tissue was dissected off of the underlying sphincter musculature with a combination of electrocautery and sharp dissection.  The resected specimen was passed from the field.  The mucosal defect was closed with a running, locked 2-0 Vicryl suture.  Anal canal was irrigated.  Hemostasis noted to be adequate.  Gel-Foam gauze was placed within the anal canal and sterile dressings were applied.     The patient tolerated the procedure well with no complications.  She was extubated in the Operating Room and taken to Recovery in satisfactory condition.  All needle, instrument and sponge counts were correct at the end of the case.  I was present throughout the entire procedure.    Wild Nicholson MD, FACS, FASCRS  Senior Staff Surgeon  Department of Colon & Rectal Surgery     This note was created using voice recognition software, and may contain some unrecognized transcriptional errors.

## 2024-02-28 NOTE — BRIEF OP NOTE
[facility] - Surgery ([facility])  Brief Operative Note    Surgery Date: 2/28/2024     Surgeon(s) and Role:     * Wild Nicholson MD - Primary     * Enrique Lazo MD - Fellow    Assisting Surgeon: None    Pre-op Diagnosis:  Hemorrhoid prolapse [K64.8]    Post-op Diagnosis:  Post-Op Diagnosis Codes:     * Hemorrhoid prolapse [K64.8]    Procedure(s) (LRB):  EXAM UNDER ANESTHESIA EXCISION ANAL LESION (N/A)    Anesthesia: Choice    Operative Findings: Anterior mass just to right of midline. Mass is soft, appears submucosal and is not fixed to underlying tissue. Low suspicion for malignancy. Excised completely and mucosa closed.     Estimated Blood Loss: 15mL         Specimens:   Specimen (24h ago, onward)      None              Discharge Note    OUTCOME: Patient tolerated treatment/procedure well without complication and is now ready for discharge.    DISPOSITION: Home or Self Care    FINAL DIAGNOSIS:  <principal problem not specified>    FOLLOWUP: In clinic    DISCHARGE INSTRUCTIONS:    Discharge Procedure Orders   Diet Adult Regular     Notify your health care provider if you experience any of the following:  temperature >100.4     Notify your health care provider if you experience any of the following:  persistent nausea and vomiting or diarrhea     Notify your health care provider if you experience any of the following:  severe uncontrolled pain     Activity as tolerated

## 2024-02-28 NOTE — PROGRESS NOTES
PreOp complete. Anesthesia consent needed. Belongings (clothing, purse, contacts, glasses, shoes) placed in the locker.

## 2024-02-28 NOTE — INTERVAL H&P NOTE
The patient has been examined and the H&P has been reviewed:    I concur with the findings and no changes have occurred since H&P was written.    Surgery risks, benefits and alternative options discussed and understood by patient/family.    To OR for exam under anesthesia with hemorrhoidectomy vs polyp excision      There are no hospital problems to display for this patient.

## 2024-02-28 NOTE — TRANSFER OF CARE
"Anesthesia Transfer of Care Note    Patient: Isaias Collado    Procedure(s) Performed: Procedure(s) (LRB):  EXAM UNDER ANESTHESIA EXCISION ANAL LESION (N/A)    Patient location: [facility]    Anesthesia Type: general    Transport from OR: Transported from OR on 6-10 L/min O2 by face mask with adequate spontaneous ventilation    Post pain: adequate analgesia    Post assessment: no apparent anesthetic complications    Post vital signs: stable    Level of consciousness: awake and alert    Nausea/Vomiting: no nausea/vomiting    Complications: none    Transfer of care protocol was followed      Last vitals: Visit Vitals  /60 (BP Location: Right arm, Patient Position: Lying)   Pulse 83   Temp 37.2 °C (98.9 °F) (Temporal)   Resp 17   Ht 5' 6" (1.676 m)   Wt 89.8 kg (198 lb)   LMP 08/26/2016   SpO2 95%   Breastfeeding No   BMI 31.96 kg/m²     "

## 2024-02-28 NOTE — DISCHARGE INSTRUCTIONS
Anal Surgery Post Op Instructions:    You had a transanal excision of anal mass performed today.     Wound care:  Expect some drainage over the next few weeks as the wound heals.  Please wear a pad to help with drainage.     You have no activity restrictions.   No dietary restrictions.    Medications:  A narcotic pain medication has been prescribed.  Please start to wean the pain medication over the following few days.  You can take tylenol instead of the pain medication.      Please take miralax 1 capful at night to prevent constipation associated with narcotic pain medications.      Follow up:  Return to clinic in 2-3 weeks for follow up and wound check.

## 2024-02-28 NOTE — ANESTHESIA PROCEDURE NOTES
Intubation    Date/Time: 2/28/2024 8:10 AM    Performed by: Yessy Carmen CRNA  Authorized by: Yessy Carmen CRNA    Intubation:     Induction:  Intravenous    Intubated:  Postinduction    Mask Ventilation:  Easy mask    Attempts:  1    Attempted By:  CRNA    Method of Intubation:  Video laryngoscopy    Blade:  Stubbs 3    Laryngeal View Grade: Grade I - full view of cords      Difficult Airway Encountered?: No      Complications:  None    Airway Device:  Oral endotracheal tube    Airway Device Size:  7.0    Style/Cuff Inflation:  Cuffed (inflated to minimal occlusive pressure)    Tube secured:  21    Secured at:  The lips    Placement Verified By:  Capnometry    Complicating Factors:  None    Findings Post-Intubation:  BS equal bilateral and atraumatic/condition of teeth unchanged

## 2024-03-01 NOTE — ANESTHESIA POSTPROCEDURE EVALUATION
Anesthesia Post Evaluation    Patient: Isaias Collado    Procedure(s) Performed: Procedure(s) (LRB):  EXAM UNDER ANESTHESIA EXCISION ANAL LESION (N/A)  EXCISION, MASS, RECTUM    Final Anesthesia Type: general      Patient location during evaluation: PACU  Patient participation: Yes- Able to Participate  Level of consciousness: awake  Post-procedure vital signs: reviewed and stable  Pain management: adequate  Airway patency: patent    PONV status at discharge: No PONV  Anesthetic complications: no      Cardiovascular status: blood pressure returned to baseline  Respiratory status: unassisted  Hydration status: euvolemic  Follow-up not needed.              Vitals Value Taken Time   /57 02/28/24 0947   Temp 36.7 °C (98.1 °F) 02/28/24 0849   Pulse 93 02/28/24 0959   Resp 13 02/28/24 0959   SpO2 94 % 02/28/24 0959   Vitals shown include unvalidated device data.      No case tracking events are documented in the log.      Pain/Jonathon Score: No data recorded

## 2024-03-04 ENCOUNTER — OFFICE VISIT (OUTPATIENT)
Dept: PSYCHIATRY | Facility: CLINIC | Age: 54
End: 2024-03-04
Payer: COMMERCIAL

## 2024-03-04 DIAGNOSIS — F41.0 GENERALIZED ANXIETY DISORDER WITH PANIC ATTACKS: Primary | ICD-10-CM

## 2024-03-04 DIAGNOSIS — F41.1 GENERALIZED ANXIETY DISORDER WITH PANIC ATTACKS: Primary | ICD-10-CM

## 2024-03-04 DIAGNOSIS — F33.1 MODERATE EPISODE OF RECURRENT MAJOR DEPRESSIVE DISORDER: ICD-10-CM

## 2024-03-04 PROCEDURE — 1159F MED LIST DOCD IN RCRD: CPT | Mod: CPTII,S$GLB,, | Performed by: SOCIAL WORKER

## 2024-03-04 PROCEDURE — 90837 PSYTX W PT 60 MINUTES: CPT | Mod: S$GLB,,, | Performed by: SOCIAL WORKER

## 2024-03-04 PROCEDURE — 99999 PR PBB SHADOW E&M-EST. PATIENT-LVL I: CPT | Mod: PBBFAC,,, | Performed by: SOCIAL WORKER

## 2024-03-04 NOTE — PROGRESS NOTES
Individual Psychotherapy (LCSW/PhD)  Isaias Collado,  3/4/2024    Site:  [facility]         Therapeutic Intervention: Met with patient for individual psychotherapy.    Chief complaint/reason for encounter: depression and anxiety     Interval history and content of current session: Pt reported she had a rectal surgery and it turns out it could be a piloma, not a Hemorid. PT reported  her brother took her. PT reported she made rent and a little extra. PT reported after the surgery all of brother called. She reproted the lady she gets some of her thrift stuff has been checking with her, along with the neighbor. Pt reported Scott is going to rent to buy. Pt reported her ex- wants to come in the first week of April to see Danis. PT reported she wants him to see Danis, but she is setting a boundary and not helping him this time. She denies SI, no HI or AVH.     Treatment plan:  Target symptoms: depression, anxiety   Why chosen therapy is appropriate versus another modality: relevant to diagnosis, patient responds to this modality, evidence based practice  Outcome monitoring methods: self-report, observation, checklist/rating scale  Therapeutic intervention type: insight oriented psychotherapy, behavior modifying psychotherapy, supportive psychotherapy    Risk parameters:  Patient reports no suicidal ideation  Patient reports no homicidal ideation  Patient reports no self-injurious behavior  Patient reports no violent behavior    Verbal deficits: None    Patient's response to intervention:  The patient's response to intervention is accepting, motivated.    Progress toward goals and other mental status changes:  The patient's progress toward goals is fair .    Diagnosis:     ICD-10-CM ICD-9-CM   1. Generalized anxiety disorder with panic attacks  F41.1 300.02    F41.0 300.01   2. Moderate episode of recurrent major depressive disorder  F33.1 296.32               Plan: Pt plans to continue individual  psychotherapy    Return to clinic: as scheduled    Length of Service (minutes): 60

## 2024-03-11 ENCOUNTER — OFFICE VISIT (OUTPATIENT)
Dept: SURGERY | Facility: CLINIC | Age: 54
End: 2024-03-11
Payer: COMMERCIAL

## 2024-03-11 VITALS
HEART RATE: 75 BPM | DIASTOLIC BLOOD PRESSURE: 76 MMHG | BODY MASS INDEX: 31.67 KG/M2 | SYSTOLIC BLOOD PRESSURE: 141 MMHG | WEIGHT: 196.19 LBS | OXYGEN SATURATION: 100 %

## 2024-03-11 DIAGNOSIS — K62.0 ANAL POLYP: Primary | ICD-10-CM

## 2024-03-11 PROCEDURE — 99999 PR PBB SHADOW E&M-EST. PATIENT-LVL III: CPT | Mod: PBBFAC,,, | Performed by: COLON & RECTAL SURGERY

## 2024-03-11 PROCEDURE — 3078F DIAST BP <80 MM HG: CPT | Mod: CPTII,S$GLB,, | Performed by: COLON & RECTAL SURGERY

## 2024-03-11 PROCEDURE — 99024 POSTOP FOLLOW-UP VISIT: CPT | Mod: S$GLB,,, | Performed by: COLON & RECTAL SURGERY

## 2024-03-11 PROCEDURE — 1160F RVW MEDS BY RX/DR IN RCRD: CPT | Mod: CPTII,S$GLB,, | Performed by: COLON & RECTAL SURGERY

## 2024-03-11 PROCEDURE — 3077F SYST BP >= 140 MM HG: CPT | Mod: CPTII,S$GLB,, | Performed by: COLON & RECTAL SURGERY

## 2024-03-11 PROCEDURE — 1159F MED LIST DOCD IN RCRD: CPT | Mod: CPTII,S$GLB,, | Performed by: COLON & RECTAL SURGERY

## 2024-03-11 NOTE — PROGRESS NOTES
CRS Post-operative visit    Visit Info:     Procedure: Examination under anesthesia and excision of anal canal mass     Date of Procedure: February 28, 2024    Indication: The patient is a 53-year-old female who presented with complaints of anal pain & bleeding as well as a prolapsing mass.  On anoscopy in the office it was not clear of this was  a prolapsing hemorrhoid or mass lesion.  She is being brought to the Operating Room today for examination under anesthesia and excision of the lesion.    Operative Findings: 3-cm soft mobile anterior midline anal canal mass - pedunculated on a broad-based stalk, prolapses easily, +fissure along right lateral aspect    Current Status: Doing well postoperatively.  No pain or bleeding with BM's, no further prolapse, no incontinence.    Pathology:   [facility] FINAL DIAGNOSIS     Interpretation   Soft tissue, anal canal, excision ([id number], 2/28/2024): Fibroepithelial polyp with atypical reactive stromal cells. Hemorrhoids are also identified.     Physical Exam:  General:    White   or  female in NAD   Neuro: aaox4   Respiratory: resps even unlabored  Extremities: Warm dry and intact      Assessment:  1. Anal polyp        S/p trans-abal excision, path sent to [facility] showed Fibroepithelial polyp with atypical reactive stromal cells    Plan:  Diet & activity as tolerated.  Has c'scope scheduled next month  RTO prn    Wild Nicholson MD, FACS, FASCRS  , Department of Colon & Rectal Surgery     This note was created using voice recognition software, and may contain some unrecognized transcriptional errors.     
Walker/Wheelchair

## 2024-03-12 LAB
FINAL PATHOLOGIC DIAGNOSIS: NORMAL
GROSS: NORMAL
Lab: NORMAL

## 2024-03-13 ENCOUNTER — ANESTHESIA EVENT (OUTPATIENT)
Dept: ENDOSCOPY | Facility: HOSPITAL | Age: 54
End: 2024-03-13
Payer: COMMERCIAL

## 2024-03-13 NOTE — ANESTHESIA PREPROCEDURE EVALUATION
03/13/2024  Isaias Collado is a 53 y.o., female.  [facility]  Anesthesia Pre-Operative Evaluation       Patient Name: Isaias Collado  YOB: 1970  MRN: [id number]  [facility]: [id number]      Code Status: No Order   Date of Procedure: 3/25/2024  Anesthesia: Choice Procedure: Procedure(s) (LRB):  COLONOSCOPY (N/A)  Pre-Operative Diagnosis: Encounter for screening for colorectal malignant neoplasm [Z12.11, Z12.12]  Proceduralist: Surgeon(s) and Role:     * Rasheed Garcia MD - Primary Nurse: (Unknown)      SUBJECTIVE:   Isaias Collado is a 53 y.o. female who  has a past medical history of Adenomyosis internal (08/2016), Allergy to ACE inhibitors (04/28/2016), Basal cell carcinoma, Dysfunctional uterine bleeding (08/2016), renal calculi, Hypertension, Panic attacks, PCOS (polycystic ovarian syndrome) (08/2016), Rosacea, Squamous cell carcinoma of skin, and Varicose vein of leg..     she has a current medication list which includes the following long-term medication(s): alprazolam, clobetasol, clobetasol 0.05%, estradiol, hydrochlorothiazide, metformin, and venlafaxine.     ALLERGIES:     Review of patient's allergies indicates:   Allergen Reactions    Lisinopril Other (See Comments)     Cough.    Erythromycin Other (See Comments)     Nausea and cold sweats    Morphine Itching    Adhesive Rash     Paper tape ok     LDA:          Lines/Drains/Airways       None                  Anesthesia Evaluation      Airway   Mallampati: II  TM distance: Normal  Neck ROM: Normal ROM  Dental    (+) Intact    Pulmonary    Cardiovascular   (+) hypertension    Rate: Normal    Neuro/Psych      GI/Hepatic/Renal      Endo/Other  Diabetes: Pre-Diabetes.  Abdominal                     MEDICATIONS:     Antibiotics (From admission, onward)      None          VTE Risk Mitigation (From admission, onward)      None               No current facility-administered medications for this encounter.     Current Outpatient Medications   Medication Sig Dispense Refill    ALPRAZolam (XANAX) 0.25 MG tablet Take 1 tablet (0.25 mg total) by mouth 2 (two) times daily as needed for Anxiety. 60 tablet 0    clobetasoL (TEMOVATE) 0.05 % cream Apply 1 application topically 2 (two) times daily.      clobetasol 0.05% (TEMOVATE) 0.05 % Oint AAA on the trunk and extremities BID x4-6 weeks then D/C. Restart PRN for flares 60 g 2    estradioL (ESTRACE) 1 MG tablet Take 1 mg by mouth.      FINACEA 15 % Foam APPLY DAILY TO SKIN TO AFFECTED AREA EVERY DAY      hydroCHLOROthiazide (HYDRODIURIL) 25 MG tablet Take 25 mg by mouth once daily.      hydrocortisone (ANUSOL-HC) 2.5 % rectal cream Place rectally 2 (two) times daily. 28 g 2    metFORMIN (GLUCOPHAGE) 500 MG tablet Take 1 tablet (500 mg total) by mouth 2 (two) times daily with meals. (Patient not taking: Reported on 2024) 60 tablet 2    oxyCODONE (ROXICODONE) 5 MG immediate release tablet Take 1 tablet (5 mg total) by mouth every 6 (six) hours as needed for Pain. 20 tablet 0    pramoxine-hydrocortisone (PROCTOCREAM-HC) 1-1 % rectal cream Place rectally 3 (three) times daily. 30 g 1    venlafaxine (EFFEXOR-XR) 75 MG 24 hr capsule Take 1 capsule (75 mg total) by mouth once daily. 90 capsule 1          History:   There are no hospital problems to display for this patient.    Surgical History:    has a past surgical history that includes  section; Skin biopsy; Tubal ligation (Bilateral); Varicose vein surgery (Right); Hysterectomy; Examination under anesthesia (N/A, 2024); and Excision of rectal mass (2024).   Social History:    has no history on file for sexual activity.  reports that she quit smoking about 23 months ago. Her smoking use included cigarettes. She has never used smokeless tobacco. She reports current alcohol use. She reports that she does not use drugs.     OBJECTIVE:  "    Vital Signs (Most Recent):    Vital Signs Range (Last 24H):          There is no height or weight on file to calculate BMI.   Wt Readings from Last 4 Encounters:   03/11/24 89 kg (196 lb 3.4 oz)   02/28/24 89.8 kg (198 lb)   02/14/24 90 kg (198 lb 6.6 oz)   02/08/24 89.9 kg (198 lb 3.1 oz)       Significant Labs:  Lab Results   Component Value Date    WBC 6.46 02/14/2024    HGB 11.8 (L) 02/14/2024    HCT 37.5 02/14/2024     02/14/2024     02/14/2024    K 3.6 02/14/2024     02/14/2024    CREATININE 0.7 02/14/2024    BUN 13 02/14/2024    CO2 26 02/14/2024    GLU 83 02/14/2024    CALCIUM 8.9 02/14/2024    ALKPHOS 83 09/20/2023    ALT 19 09/20/2023    AST 20 09/20/2023    ALBUMIN 3.9 09/20/2023    HGBA1C 5.7 (H) 09/20/2023     Patient's last menstrual period was 08/26/2016.  No results found for this or any previous visit (from the past 72 hour(s)).    EKG:   Results for orders placed or performed during the hospital encounter of 02/14/24   EKG 12-lead    Collection Time: 02/14/24  3:58 PM   Result Value Ref Range    QRS Duration 84 ms    [facility] QTC Calculation 426 ms    Narrative    Test Reason : K64.8,    Vent. Rate : 074 BPM     Atrial Rate : 074 BPM     P-R Int : 134 ms          QRS Dur : 084 ms      QT Int : 384 ms       P-R-T Axes : 047 039 009 degrees     QTc Int : 426 ms    Normal sinus rhythm  Normal ECG  No previous ECGs available  Confirmed by MONA AGUILLON MD ([id number]) on 2/15/2024 7:49:59 AM    Referred By: MICHELLE VICKERS           Confirmed By:MONA AGUILLON MD       TTE:  No results found for this or any previous visit.  No results found for: "EF"   No results found for this or any previous visit.  ADOLFO:  No results found for this or any previous visit.  Stress Test:  No results found for this or any previous visit.     LHC:  No results found for this or any previous visit.     PFT:  No results found for: "FEV1", "FVC", "UEX8NPP", "TLC", "DLCO"     ASSESSMENT/PLAN:        Pre-op " Assessment    I have reviewed the Patient Summary Reports.     I have reviewed the Nursing Notes. I have reviewed the NPO Status.   I have reviewed the Medications.     Review of Systems  Anesthesia Hx:             Denies Family Hx of Anesthesia complications.    Denies Personal Hx of Anesthesia complications.                    Social:  Former Smoker, Alcohol Use       Cardiovascular:     Hypertension          PVD                              OB/GYN/PEDS:  Polycystic ovarian syndrome           Endocrine:  Diabetes: Pre-Diabetes.           Psych:   anxiety (PANFILO) depression                Physical Exam  General: Well nourished, Cooperative, Alert and Oriented    Airway:  Mallampati: II   Mouth Opening: Normal  TM Distance: Normal  Tongue: Normal  Neck ROM: Normal ROM    Dental:  Intact    Chest/Lungs:  Clear to auscultation    Heart:  Rate: Normal  Rhythm: Regular Rhythm  Sounds: Normal    Abdomen:  Normal        Anesthesia Plan  Type of Anesthesia, risks & benefits discussed:    Anesthesia Type: Gen Natural Airway  Intra-op Monitoring Plan: Standard ASA Monitors  Post Op Pain Control Plan: multimodal analgesia  Induction:  IV  Informed Consent: Informed consent signed with the Patient and all parties understand the risks and agree with anesthesia plan.  All questions answered. Patient consented to blood products? No  ASA Score: 2  Day of Surgery Review of History & Physical: H&P Update referred to the surgeon/provider.    Ready For Surgery From Anesthesia Perspective.     .

## 2024-03-18 ENCOUNTER — TELEPHONE (OUTPATIENT)
Dept: ENDOSCOPY | Facility: HOSPITAL | Age: 54
End: 2024-03-18
Payer: COMMERCIAL

## 2024-03-18 ENCOUNTER — OFFICE VISIT (OUTPATIENT)
Dept: INTERNAL MEDICINE | Facility: CLINIC | Age: 54
End: 2024-03-18
Payer: COMMERCIAL

## 2024-03-18 VITALS
HEART RATE: 80 BPM | HEIGHT: 66 IN | SYSTOLIC BLOOD PRESSURE: 134 MMHG | OXYGEN SATURATION: 98 % | WEIGHT: 196.63 LBS | BODY MASS INDEX: 31.6 KG/M2 | DIASTOLIC BLOOD PRESSURE: 82 MMHG

## 2024-03-18 DIAGNOSIS — R68.89 CUSHINGOID FACIES: Primary | ICD-10-CM

## 2024-03-18 PROCEDURE — 99213 OFFICE O/P EST LOW 20 MIN: CPT | Mod: S$GLB,,, | Performed by: FAMILY MEDICINE

## 2024-03-18 PROCEDURE — 1159F MED LIST DOCD IN RCRD: CPT | Mod: CPTII,S$GLB,, | Performed by: FAMILY MEDICINE

## 2024-03-18 PROCEDURE — 3008F BODY MASS INDEX DOCD: CPT | Mod: CPTII,S$GLB,, | Performed by: FAMILY MEDICINE

## 2024-03-18 PROCEDURE — 3079F DIAST BP 80-89 MM HG: CPT | Mod: CPTII,S$GLB,, | Performed by: FAMILY MEDICINE

## 2024-03-18 PROCEDURE — 3075F SYST BP GE 130 - 139MM HG: CPT | Mod: CPTII,S$GLB,, | Performed by: FAMILY MEDICINE

## 2024-03-18 PROCEDURE — 99999 PR PBB SHADOW E&M-EST. PATIENT-LVL III: CPT | Mod: PBBFAC,,, | Performed by: FAMILY MEDICINE

## 2024-03-18 NOTE — TELEPHONE ENCOUNTER
Left voicemail and sent portal message for patient to call Endoscopy Scheduling to review instructions and confirm appointment for Colonoscopy on 3/25/24.

## 2024-03-18 NOTE — PROGRESS NOTES
"Subjective:     Patient ID: Isaias Collado is a 53 y.o. female.   Chief Complaint: No chief complaint on file.    HPI:  Pt requesting testing for Cushing's.     Reports she has the following sx:  Abdominal striae  Red, round face  Dorsal fat pad  Excessive abdominal fat, not responsive to diet and exercise as expected  Thin extremities  Chronic anxiety  Reports remote hx chronic glucocorticoid use    No prior endocrine work-up for this condition. Other hormonal studies have been unremarkable.      Review of Systems   Constitutional:  Positive for unexpected weight change. Negative for chills, fatigue and fever.   Gastrointestinal:  Negative for abdominal distention and abdominal pain.   Endocrine: Negative for cold intolerance, heat intolerance, polydipsia, polyphagia and polyuria.   Integumentary:  Positive for color change.   Psychiatric/Behavioral:  The patient is nervous/anxious.           Objective:      Vitals:    03/18/24 1045   BP: 134/82   BP Location: Left arm   Patient Position: Sitting   BP Method: Medium (Manual)   Pulse: 80   SpO2: 98%   Weight: 89.2 kg (196 lb 10.4 oz)   Height: 5' 6" (1.676 m)      Physical Exam  Constitutional:       General: She is not in acute distress.     Appearance: Normal appearance. She is obese. She is not ill-appearing.   HENT:      Head: Normocephalic and atraumatic.   Cardiovascular:      Rate and Rhythm: Normal rate and regular rhythm.      Pulses: Normal pulses.      Heart sounds: Normal heart sounds. No murmur heard.     No friction rub. No gallop.   Pulmonary:      Effort: Pulmonary effort is normal. No respiratory distress.      Breath sounds: Normal breath sounds. No wheezing, rhonchi or rales.   Abdominal:      General: Abdomen is flat. There is no distension.      Palpations: Abdomen is soft.      Tenderness: There is no abdominal tenderness.   Musculoskeletal:         General: Normal range of motion.      Cervical back: Normal range of motion and neck supple.      " Comments: Dorsal fat pad appreciated   Lymphadenopathy:      Cervical: No cervical adenopathy.   Skin:     Comments: Scattered striae, abdomen   Neurological:      General: No focal deficit present.      Mental Status: She is alert and oriented to person, place, and time. Mental status is at baseline.   Psychiatric:         Mood and Affect: Mood normal.         Behavior: Behavior normal.         Thought Content: Thought content normal.         Judgment: Judgment normal.           Assessment:       Problem List Items Addressed This Visit    None  Visit Diagnoses       Cushingoid facies    -  Primary    Relevant Orders    Salivary Cortisol X2, Timed    CORTISOL, 8AM    Ambulatory referral/consult to Endocrinology              Plan:       1. Cushingoid facies  Starting basic w/u for Cushing's  AM coritsol and salivary cortisol ordered  Starting endocrine ref for additional eval pending results  - Salivary Cortisol X2, Timed; Future  - CORTISOL, 8AM; Future  - Ambulatory referral/consult to Endocrinology; Future  - Salivary Cortisol X2, Timed          No follow-ups on file.     Bhavik Ryan MD, FAAFP  Family Medicine Physician  [facility]  03/18/2024

## 2024-03-19 ENCOUNTER — LAB VISIT (OUTPATIENT)
Dept: LAB | Facility: HOSPITAL | Age: 54
End: 2024-03-19
Attending: FAMILY MEDICINE
Payer: COMMERCIAL

## 2024-03-19 ENCOUNTER — PATIENT MESSAGE (OUTPATIENT)
Dept: SURGERY | Facility: CLINIC | Age: 54
End: 2024-03-19
Payer: COMMERCIAL

## 2024-03-19 DIAGNOSIS — R68.89 CUSHINGOID FACIES: ICD-10-CM

## 2024-03-19 LAB — CORTIS SERPL-MCNC: 14 UG/DL (ref 4.3–22.4)

## 2024-03-19 PROCEDURE — 82533 TOTAL CORTISOL: CPT | Performed by: FAMILY MEDICINE

## 2024-03-19 PROCEDURE — 36415 COLL VENOUS BLD VENIPUNCTURE: CPT | Mod: PO | Performed by: FAMILY MEDICINE

## 2024-03-25 ENCOUNTER — ANESTHESIA (OUTPATIENT)
Dept: ENDOSCOPY | Facility: HOSPITAL | Age: 54
End: 2024-03-25
Payer: COMMERCIAL

## 2024-03-25 ENCOUNTER — HOSPITAL ENCOUNTER (OUTPATIENT)
Facility: HOSPITAL | Age: 54
Discharge: HOME | End: 2024-03-25
Attending: STUDENT IN AN ORGANIZED HEALTH CARE EDUCATION/TRAINING PROGRAM | Admitting: STUDENT IN AN ORGANIZED HEALTH CARE EDUCATION/TRAINING PROGRAM
Payer: COMMERCIAL

## 2024-03-25 VITALS
BODY MASS INDEX: 31.5 KG/M2 | TEMPERATURE: 98 F | SYSTOLIC BLOOD PRESSURE: 120 MMHG | HEART RATE: 80 BPM | RESPIRATION RATE: 16 BRPM | OXYGEN SATURATION: 98 % | DIASTOLIC BLOOD PRESSURE: 77 MMHG | HEIGHT: 66 IN | WEIGHT: 196 LBS

## 2024-03-25 DIAGNOSIS — Z12.11 COLON CANCER SCREENING: Primary | ICD-10-CM

## 2024-03-25 PROCEDURE — 37000008 HC ANESTHESIA 1ST 15 MINUTES: Performed by: STUDENT IN AN ORGANIZED HEALTH CARE EDUCATION/TRAINING PROGRAM

## 2024-03-25 PROCEDURE — 99900035 HC TECH TIME PER 15 MIN (STAT)

## 2024-03-25 PROCEDURE — 45385 COLONOSCOPY W/LESION REMOVAL: CPT | Mod: 33,,, | Performed by: STUDENT IN AN ORGANIZED HEALTH CARE EDUCATION/TRAINING PROGRAM

## 2024-03-25 PROCEDURE — 88305 TISSUE EXAM BY PATHOLOGIST: CPT | Performed by: STUDENT IN AN ORGANIZED HEALTH CARE EDUCATION/TRAINING PROGRAM

## 2024-03-25 PROCEDURE — 37000009 HC ANESTHESIA EA ADD 15 MINS: Performed by: STUDENT IN AN ORGANIZED HEALTH CARE EDUCATION/TRAINING PROGRAM

## 2024-03-25 PROCEDURE — 45385 COLONOSCOPY W/LESION REMOVAL: CPT | Mod: PT | Performed by: STUDENT IN AN ORGANIZED HEALTH CARE EDUCATION/TRAINING PROGRAM

## 2024-03-25 PROCEDURE — 88305 TISSUE EXAM BY PATHOLOGIST: CPT | Mod: 26,,, | Performed by: STUDENT IN AN ORGANIZED HEALTH CARE EDUCATION/TRAINING PROGRAM

## 2024-03-25 PROCEDURE — 63600175 PHARM REV CODE 636 W HCPCS: Performed by: NURSE ANESTHETIST, CERTIFIED REGISTERED

## 2024-03-25 PROCEDURE — 94761 N-INVAS EAR/PLS OXIMETRY MLT: CPT

## 2024-03-25 PROCEDURE — 25000003 PHARM REV CODE 250: Performed by: NURSE ANESTHETIST, CERTIFIED REGISTERED

## 2024-03-25 PROCEDURE — 27201089 HC SNARE, DISP (ANY): Performed by: STUDENT IN AN ORGANIZED HEALTH CARE EDUCATION/TRAINING PROGRAM

## 2024-03-25 PROCEDURE — D9220A PRA ANESTHESIA: Mod: 33,,, | Performed by: NURSE ANESTHETIST, CERTIFIED REGISTERED

## 2024-03-25 RX ORDER — PROPOFOL 10 MG/ML
VIAL (ML) INTRAVENOUS CONTINUOUS PRN
Status: DISCONTINUED | OUTPATIENT
Start: 2024-03-25 | End: 2024-03-25

## 2024-03-25 RX ORDER — LIDOCAINE HYDROCHLORIDE 10 MG/ML
INJECTION, SOLUTION INTRAVENOUS
Status: DISCONTINUED | OUTPATIENT
Start: 2024-03-25 | End: 2024-03-25

## 2024-03-25 RX ORDER — PROPOFOL 10 MG/ML
VIAL (ML) INTRAVENOUS
Status: DISCONTINUED | OUTPATIENT
Start: 2024-03-25 | End: 2024-03-25

## 2024-03-25 RX ADMIN — GLYCOPYRROLATE 0.2 MG: 0.2 INJECTION, SOLUTION INTRAMUSCULAR; INTRAVENOUS at 09:03

## 2024-03-25 RX ADMIN — LIDOCAINE HYDROCHLORIDE 100 MG: 10 INJECTION, SOLUTION INTRAVENOUS at 09:03

## 2024-03-25 RX ADMIN — PROPOFOL 100 MG: 10 INJECTION, EMULSION INTRAVENOUS at 09:03

## 2024-03-25 RX ADMIN — SODIUM CHLORIDE: 0.9 INJECTION, SOLUTION INTRAVENOUS at 08:03

## 2024-03-25 RX ADMIN — PROPOFOL 200 MCG/KG/MIN: 10 INJECTION, EMULSION INTRAVENOUS at 09:03

## 2024-03-25 NOTE — PROVATION PATIENT INSTRUCTIONS
Discharge Summary/Instructions after an Endoscopic Procedure  Patient Name: Isaias Collado  Patient MRN: [id number]  Patient YOB: 1970 Monday, March 25, 2024  Rasheed Garcia MD  Dear patient,  As a result of recent federal legislation (The Federal Cures Act), you may   receive lab or pathology results from your procedure in your [facility]   account before your physician is able to contact you. Your physician or   their representative will relay the results to you with their   recommendations at their soonest availability.  Thank you,  RESTRICTIONS:  During your procedure today, you received medications for sedation.  These   medications may affect your judgment, balance and coordination.  Therefore,   for 24 hours, you have the following restrictions:   - DO NOT drive a car, operate machinery, make legal/financial decisions,   sign important papers or drink alcohol.    ACTIVITY:  Today: no heavy lifting, straining or running due to procedural   sedation/anesthesia.  The following day: return to full activity including work.  DIET:  Eat and drink normally unless instructed otherwise.     TREATMENT FOR COMMON SIDE EFFECTS:  - Mild abdominal pain, nausea, belching, bloating or excessive gas:  rest,   eat lightly and use a heating pad.  - Sore Throat: treat with throat lozenges and/or gargle with warm salt   water.  - Because air was used during the procedure, expelling large amounts of air   from your rectum or belching is normal.  - If a bowel prep was taken, you may not have a bowel movement for 1-3 days.    This is normal.  SYMPTOMS TO WATCH FOR AND REPORT TO YOUR PHYSICIAN:  1. Abdominal pain or bloating, other than gas cramps.  2. Chest pain.  3. Back pain.  4. Signs of infection such as: chills or fever occurring within 24 hours   after the procedure.  5. Rectal bleeding, which would show as bright red, maroon, or black stools.   (A tablespoon of blood from the rectum is not serious, especially if    hemorrhoids are present.)  6. Vomiting.  7. Weakness or dizziness.  GO DIRECTLY TO THE NEAREST EMERGENCY ROOM IF YOU HAVE ANY OF THE FOLLOWING:      Difficulty breathing              Chills and/or fever over 101 F   Persistent vomiting and/or vomiting blood   Severe abdominal pain   Severe chest pain   Black, tarry stools   Bleeding- more than one tablespoon   Any other symptom or condition that you feel may need urgent attention  Your doctor recommends these additional instructions:  If any biopsies were taken, your doctors clinic will contact you in 1 to 2   weeks with any results.  - Discharge patient to home (ambulatory).   - Patient has a contact number available for emergencies.  The signs and   symptoms of potential delayed complications were discussed with the   patient.  Return to normal activities tomorrow.  Written discharge   instructions were provided to the patient.   - Resume previous diet.   - Continue present medications.   - Return to primary care physician as previously scheduled.   - Repeat colonoscopy in 7 years for surveillance.  For questions, problems or results please call your physician - Rasheed Garcia MD at Work:  [phone].  [facility], EMERGENCY ROOM PHONE NUMBER: [phone]  IF A COMPLICATION OR EMERGENCY SITUATION ARISES AND YOU ARE UNABLE TO REACH   YOUR PHYSICIAN - GO DIRECTLY TO THE EMERGENCY ROOM.  Rasheed Garcia MD  3/25/2024 9:26:26 AM  This report has been verified and signed electronically.  Dear patient,  As a result of recent federal legislation (The Federal Cures Act), you may   receive lab or pathology results from your procedure in your [facility]   account before your physician is able to contact you. Your physician or   their representative will relay the results to you with their   recommendations at their soonest availability.  Thank you,  PROVATION

## 2024-03-25 NOTE — H&P
Short Stay Endoscopy History and Physical    PCP - Bhavik Ryan MD  Referring Physician - PRE-ADMIT NURSE 3, ENDO -[facility]  No address on file    Procedure - Colonoscopy  ASA - per anesthesia  Mallampati - per anesthesia  History of Anesthesia problems - no  Family history Anesthesia problems -  no   Plan of anesthesia - General    HPI  53 y.o. female  Reason for procedure:   Encounter for screening for colorectal malignant neoplasm [Z12.11, Z12.12]        ROS:  Constitutional: No fevers, chills, No weight loss  CV: No chest pain  Pulm: No cough, No shortness of breath  GI: see HPI    Medical History:  has a past medical history of Adenomyosis internal (2016), Allergy to ACE inhibitors (2016), Basal cell carcinoma, Dysfunctional uterine bleeding (2016), renal calculi, Hypertension, Panic attacks, PCOS (polycystic ovarian syndrome) (2016), Rosacea, Squamous cell carcinoma of skin, and Varicose vein of leg.    Surgical History:  has a past surgical history that includes  section; Skin biopsy; Tubal ligation (Bilateral); Varicose vein surgery (Right); Hysterectomy; Examination under anesthesia (N/A, 2024); and Excision of rectal mass (2024).    Family History: family history includes Coronary artery disease in her father; Diabetes in her father and mother; Heart disease in her father; Heart failure in her father..    Social History:  reports that she quit smoking about 1 years ago. Her smoking use included cigarettes. She has never used smokeless tobacco. She reports current alcohol use. She reports that she does not use drugs.    Review of patient's allergies indicates:   Allergen Reactions    Lisinopril Other (See Comments)     Cough.    Erythromycin Other (See Comments)     Nausea and cold sweats    Morphine Itching    Adhesive Rash     Paper tape ok       Medications:   Medications Prior to Admission   Medication Sig Dispense Refill Last Dose    ALPRAZolam (XANAX)  0.25 MG tablet Take 1 tablet (0.25 mg total) by mouth 2 (two) times daily as needed for Anxiety. 60 tablet 0 Past Month    clobetasoL (TEMOVATE) 0.05 % cream Apply 1 application topically 2 (two) times daily.   Past Month    clobetasol 0.05% (TEMOVATE) 0.05 % Oint AAA on the trunk and extremities BID x4-6 weeks then D/C. Restart PRN for flares 60 g 2 Past Month    estradioL (ESTRACE) 1 MG tablet Take 1 mg by mouth.   Past Month    FINACEA 15 % Foam APPLY DAILY TO SKIN TO AFFECTED AREA EVERY DAY   Past Month    hydroCHLOROthiazide (HYDRODIURIL) 25 MG tablet Take 25 mg by mouth once daily.   Past Week    hydrocortisone (ANUSOL-HC) 2.5 % rectal cream Place rectally 2 (two) times daily. 28 g 2 Past Month    venlafaxine (EFFEXOR-XR) 75 MG 24 hr capsule Take 1 capsule (75 mg total) by mouth once daily. 90 capsule 1 3/24/2024    metFORMIN (GLUCOPHAGE) 500 MG tablet Take 1 tablet (500 mg total) by mouth 2 (two) times daily with meals. (Patient not taking: Reported on 2/6/2024) 60 tablet 2     oxyCODONE (ROXICODONE) 5 MG immediate release tablet Take 1 tablet (5 mg total) by mouth every 6 (six) hours as needed for Pain. 20 tablet 0 More than a month    pramoxine-hydrocortisone (PROCTOCREAM-HC) 1-1 % rectal cream Place rectally 3 (three) times daily. 30 g 1 More than a month       Physical Exam:    Vital Signs:   Vitals:    03/25/24 0823   BP: 119/68   Pulse: 84   Resp: 18   Temp: 97.5 °F (36.4 °C)       General Appearance: Well appearing in no acute distress  Abdomen: Soft, non tender, non distended with normal bowel sounds, no masses    Labs:  Lab Results   Component Value Date    WBC 6.46 02/14/2024    HGB 11.8 (L) 02/14/2024    HCT 37.5 02/14/2024     02/14/2024    CHOL 302 (H) 09/20/2023    TRIG 200 (H) 09/20/2023    HDL 67 09/20/2023    ALT 19 09/20/2023    AST 20 09/20/2023     02/14/2024    K 3.6 02/14/2024     02/14/2024    CREATININE 0.7 02/14/2024    BUN 13 02/14/2024    CO2 26 02/14/2024    TSH  1.460 09/20/2023    HGBA1C 5.7 (H) 09/20/2023       I have explained the risks and benefits of this endoscopic procedure to the patient including but not limited to bleeding, inflammation, infection, perforation, and death.      Rasheed Garcia MD

## 2024-03-25 NOTE — ANESTHESIA POSTPROCEDURE EVALUATION
Anesthesia Post Evaluation    Patient: Isaias Collado    Procedure(s) Performed: Procedure(s) (LRB):  COLONOSCOPY (N/A)    Final Anesthesia Type: general      Patient location during evaluation: PACU  Patient participation: Yes- Able to Participate  Level of consciousness: awake and alert  Post-procedure vital signs: reviewed and stable  Pain management: adequate  Airway patency: patent    PONV status at discharge: No PONV  Anesthetic complications: no      Cardiovascular status: stable  Respiratory status: room air  Hydration status: euvolemic  Follow-up not needed.              Vitals Value Taken Time   /77 03/25/24 0946   Temp 36.4 °C (97.6 °F) 03/25/24 0925   Pulse 78 03/25/24 0949   Resp 29 03/25/24 0949   SpO2 100 % 03/25/24 0949   Vitals shown include unvalidated device data.      No case tracking events are documented in the log.      Pain/Jonathon Score: Jonathon Score: 10 (3/25/2024  9:40 AM)

## 2024-03-25 NOTE — TRANSFER OF CARE
"Anesthesia Transfer of Care Note    Patient: Isaias Collado    Procedure(s) Performed: Procedure(s) (LRB):  COLONOSCOPY (N/A)    Patient location: PACU    Anesthesia Type: general    Transport from OR: Transported from OR on room air with adequate spontaneous ventilation    Post pain: adequate analgesia    Post assessment: no apparent anesthetic complications and tolerated procedure well    Post vital signs: stable    Level of consciousness: responds to stimulation and sedated    Nausea/Vomiting: no nausea/vomiting    Complications: none    Transfer of care protocol was followed      Last vitals: Visit Vitals  /68 (BP Location: Left arm, Patient Position: Lying)   Pulse 84   Temp 36.4 °C (97.5 °F) (Temporal)   Resp 18   Ht 5' 6" (1.676 m)   Wt 88.9 kg (196 lb)   LMP 08/26/2016   SpO2 97%   Breastfeeding No   BMI 31.64 kg/m²     "

## 2024-04-02 LAB
FINAL PATHOLOGIC DIAGNOSIS: NORMAL
GROSS: NORMAL
Lab: NORMAL

## 2024-04-26 ENCOUNTER — HOSPITAL ENCOUNTER (EMERGENCY)
Facility: HOSPITAL | Age: 54
Discharge: HOME | End: 2024-04-26
Attending: EMERGENCY MEDICINE
Payer: COMMERCIAL

## 2024-04-26 VITALS
TEMPERATURE: 98 F | BODY MASS INDEX: 31.66 KG/M2 | HEART RATE: 76 BPM | RESPIRATION RATE: 16 BRPM | DIASTOLIC BLOOD PRESSURE: 77 MMHG | OXYGEN SATURATION: 98 % | WEIGHT: 197 LBS | HEIGHT: 66 IN | SYSTOLIC BLOOD PRESSURE: 122 MMHG

## 2024-04-26 DIAGNOSIS — N20.1 URETEROLITHIASIS: Primary | ICD-10-CM

## 2024-04-26 DIAGNOSIS — R10.9 LEFT FLANK PAIN: ICD-10-CM

## 2024-04-26 LAB
ALBUMIN SERPL BCP-MCNC: 3.7 G/DL (ref 3.5–5.2)
ALP SERPL-CCNC: 75 U/L (ref 55–135)
ALT SERPL W/O P-5'-P-CCNC: 20 U/L (ref 10–44)
ANION GAP SERPL CALC-SCNC: 9 MMOL/L (ref 8–16)
AST SERPL-CCNC: 22 U/L (ref 10–40)
BACTERIA #/AREA URNS AUTO: ABNORMAL /HPF
BASOPHILS # BLD AUTO: 0.04 K/UL (ref 0–0.2)
BASOPHILS NFR BLD: 0.5 % (ref 0–1.9)
BILIRUB SERPL-MCNC: 0.3 MG/DL (ref 0.1–1)
BILIRUB UR QL STRIP: NEGATIVE
BUN SERPL-MCNC: 20 MG/DL (ref 6–20)
BUN SERPL-MCNC: 24 MG/DL (ref 6–30)
CALCIUM SERPL-MCNC: 9 MG/DL (ref 8.7–10.5)
CHLORIDE SERPL-SCNC: 106 MMOL/L (ref 95–110)
CHLORIDE SERPL-SCNC: 107 MMOL/L (ref 95–110)
CLARITY UR REFRACT.AUTO: CLEAR
CO2 SERPL-SCNC: 21 MMOL/L (ref 23–29)
COLOR UR AUTO: YELLOW
CREAT SERPL-MCNC: 0.7 MG/DL (ref 0.5–1.4)
CREAT SERPL-MCNC: 0.8 MG/DL (ref 0.5–1.4)
DIFFERENTIAL METHOD BLD: ABNORMAL
EOSINOPHIL # BLD AUTO: 0.1 K/UL (ref 0–0.5)
EOSINOPHIL NFR BLD: 0.9 % (ref 0–8)
ERYTHROCYTE [DISTWIDTH] IN BLOOD BY AUTOMATED COUNT: 13.9 % (ref 11.5–14.5)
EST. GFR  (NO RACE VARIABLE): >60 ML/MIN/1.73 M^2
GLUCOSE SERPL-MCNC: 155 MG/DL (ref 70–110)
GLUCOSE SERPL-MCNC: 162 MG/DL (ref 70–110)
GLUCOSE UR QL STRIP: NEGATIVE
HCT VFR BLD AUTO: 38.6 % (ref 37–48.5)
HCT VFR BLD CALC: 37 %PCV (ref 36–54)
HGB BLD-MCNC: 12.1 G/DL (ref 12–16)
HGB UR QL STRIP: ABNORMAL
IMM GRANULOCYTES # BLD AUTO: 0.02 K/UL (ref 0–0.04)
IMM GRANULOCYTES NFR BLD AUTO: 0.3 % (ref 0–0.5)
KETONES UR QL STRIP: NEGATIVE
LEUKOCYTE ESTERASE UR QL STRIP: NEGATIVE
LYMPHOCYTES # BLD AUTO: 1.3 K/UL (ref 1–4.8)
LYMPHOCYTES NFR BLD: 16.4 % (ref 18–48)
MCH RBC QN AUTO: 28.6 PG (ref 27–31)
MCHC RBC AUTO-ENTMCNC: 31.3 G/DL (ref 32–36)
MCV RBC AUTO: 91 FL (ref 82–98)
MICROSCOPIC COMMENT: ABNORMAL
MONOCYTES # BLD AUTO: 0.4 K/UL (ref 0.3–1)
MONOCYTES NFR BLD: 4.8 % (ref 4–15)
NEUTROPHILS # BLD AUTO: 6.1 K/UL (ref 1.8–7.7)
NEUTROPHILS NFR BLD: 77.1 % (ref 38–73)
NITRITE UR QL STRIP: NEGATIVE
NRBC BLD-RTO: 0 /100 WBC
PH UR STRIP: 6 [PH] (ref 5–8)
PLATELET # BLD AUTO: 203 K/UL (ref 150–450)
PMV BLD AUTO: 10.4 FL (ref 9.2–12.9)
POC IONIZED CALCIUM: 1.14 MMOL/L (ref 1.06–1.42)
POC TCO2 (MEASURED): 25 MMOL/L (ref 23–29)
POTASSIUM BLD-SCNC: 4.2 MMOL/L (ref 3.5–5.1)
POTASSIUM SERPL-SCNC: 4.2 MMOL/L (ref 3.5–5.1)
PROT SERPL-MCNC: 7.1 G/DL (ref 6–8.4)
PROT UR QL STRIP: NEGATIVE
RBC # BLD AUTO: 4.23 M/UL (ref 4–5.4)
RBC #/AREA URNS AUTO: 23 /HPF (ref 0–4)
SAMPLE: ABNORMAL
SODIUM BLD-SCNC: 138 MMOL/L (ref 136–145)
SODIUM SERPL-SCNC: 137 MMOL/L (ref 136–145)
SP GR UR STRIP: 1.02 (ref 1–1.03)
SQUAMOUS #/AREA URNS AUTO: 1 /HPF
URN SPEC COLLECT METH UR: ABNORMAL
WBC # BLD AUTO: 7.87 K/UL (ref 3.9–12.7)
WBC #/AREA URNS AUTO: 2 /HPF (ref 0–5)

## 2024-04-26 PROCEDURE — 80048 BASIC METABOLIC PNL TOTAL CA: CPT | Mod: XB

## 2024-04-26 PROCEDURE — 81001 URINALYSIS AUTO W/SCOPE: CPT | Performed by: EMERGENCY MEDICINE

## 2024-04-26 PROCEDURE — 96374 THER/PROPH/DIAG INJ IV PUSH: CPT

## 2024-04-26 PROCEDURE — 80053 COMPREHEN METABOLIC PANEL: CPT | Performed by: EMERGENCY MEDICINE

## 2024-04-26 PROCEDURE — 96361 HYDRATE IV INFUSION ADD-ON: CPT

## 2024-04-26 PROCEDURE — 63600175 PHARM REV CODE 636 W HCPCS: Performed by: EMERGENCY MEDICINE

## 2024-04-26 PROCEDURE — 85025 COMPLETE CBC W/AUTO DIFF WBC: CPT | Performed by: EMERGENCY MEDICINE

## 2024-04-26 PROCEDURE — 99285 EMERGENCY DEPT VISIT HI MDM: CPT | Mod: 25

## 2024-04-26 RX ORDER — OXYCODONE AND ACETAMINOPHEN 5; 325 MG/1; MG/1
1 TABLET ORAL EVERY 6 HOURS PRN
Qty: 8 TABLET | Refills: 0 | Status: SHIPPED | OUTPATIENT
Start: 2024-04-26

## 2024-04-26 RX ORDER — KETOROLAC TROMETHAMINE 30 MG/ML
10 INJECTION, SOLUTION INTRAMUSCULAR; INTRAVENOUS
Status: COMPLETED | OUTPATIENT
Start: 2024-04-26 | End: 2024-04-26

## 2024-04-26 RX ORDER — TAMSULOSIN HYDROCHLORIDE 0.4 MG/1
0.4 CAPSULE ORAL DAILY
Qty: 7 CAPSULE | Refills: 0 | Status: SHIPPED | OUTPATIENT
Start: 2024-04-26 | End: 2024-04-26

## 2024-04-26 RX ORDER — TAMSULOSIN HYDROCHLORIDE 0.4 MG/1
0.4 CAPSULE ORAL DAILY
Qty: 7 CAPSULE | Refills: 0 | Status: SHIPPED | OUTPATIENT
Start: 2024-04-26

## 2024-04-26 RX ADMIN — KETOROLAC TROMETHAMINE 10 MG: 30 INJECTION, SOLUTION INTRAMUSCULAR; INTRAVENOUS at 05:04

## 2024-04-26 RX ADMIN — SODIUM CHLORIDE, POTASSIUM CHLORIDE, SODIUM LACTATE AND CALCIUM CHLORIDE 1000 ML: 600; 310; 30; 20 INJECTION, SOLUTION INTRAVENOUS at 05:04

## 2024-04-26 NOTE — ED NOTES
I-STAT Chem-8+ Results:    Value Reference Range   Sodium 138 136-145 mmol/L   Potassium  4.2 3.5-5.1 mmol/L   Chloride 106  mmol/L   Ionized Calcium 1.14 1.06-1.42 mmol/L   CO2 (measured) 25 23-29 mmol/L   Glucose 162  mg/dL   BUN 24 6-30 mg/dL   Creatinine 0.7 0.5-1.4 mg/dL   Hematocrit 37 36-54%

## 2024-04-26 NOTE — ED PROVIDER NOTES
History:  Isaias Collado is a 53 y.o. female who presents to the ED with Flank Pain (Pt complaining of left sided flank pain. Pt has a history of kidney stones and states this feels similar. Denies N/V)    Described as 53-year-old female with a history of kidney stones presenting to the emergency department with flank pain, concern for kidney stone.  She reports she awoke at midnight with an acute throbbing pain in her left flank, feeling like her prior kidney stones.  She was unable to find a comfortable position.  She denies any nausea or vomiting.  She denies any dysuria or hematuria.  Her pain became unbearable, she took an oxycodone she had at home with some improvement, and subsequently came to the ER for further evaluation.  She denies any fevers or chills.    Review of Systems: All systems reviewed and are negative except as per history of present illness.    Medications:   Discharge Medication List as of 4/26/2024  6:42 AM        CONTINUE these medications which have NOT CHANGED    Details   ALPRAZolam (XANAX) 0.25 MG tablet Take 1 tablet (0.25 mg total) by mouth 2 (two) times daily as needed for Anxiety., Starting Mon 11/6/2023, Until Mon 3/25/2024 at 2359, Print      clobetasoL (TEMOVATE) 0.05 % cream Apply 1 application topically 2 (two) times daily., Starting Thu 12/16/2021, Historical Med      clobetasol 0.05% (TEMOVATE) 0.05 % Oint AAA on the trunk and extremities BID x4-6 weeks then D/C. Restart PRN for flares, Normal      estradioL (ESTRACE) 1 MG tablet Take 1 mg by mouth., Starting Wed 9/27/2023, Historical Med      FINACEA 15 % Foam APPLY DAILY TO SKIN TO AFFECTED AREA EVERY DAY, Historical Med      hydroCHLOROthiazide (HYDRODIURIL) 25 MG tablet Take 25 mg by mouth once daily., Starting Tue 11/9/2021, Historical Med      hydrocortisone (ANUSOL-HC) 2.5 % rectal cream Place rectally 2 (two) times daily., Starting Thu 2/8/2024, Normal      metFORMIN (GLUCOPHAGE) 500 MG tablet Take 1 tablet (500 mg  total) by mouth 2 (two) times daily with meals., Starting Thu 2023, Until Wed 10/25/2023, Normal      oxyCODONE (ROXICODONE) 5 MG immediate release tablet Take 1 tablet (5 mg total) by mouth every 6 (six) hours as needed for Pain., Starting 2024, Normal      pramoxine-hydrocortisone (PROCTOCREAM-HC) 1-1 % rectal cream Place rectally 3 (three) times daily., Starting 2024, Normal      venlafaxine (EFFEXOR-XR) 75 MG 24 hr capsule Take 1 capsule (75 mg total) by mouth once daily., Starting 2024, Until Sun 2024, Normal             PMH:   Past Medical History:   Diagnosis Date    Adenomyosis internal 2016    Allergy to ACE inhibitors 2016    Basal cell carcinoma     Dysfunctional uterine bleeding 2016    Hx of renal calculi     Hypertension     no problem since weight loss     Panic attacks     PCOS (polycystic ovarian syndrome) 2016    w/ insulin resistance    Rosacea     Squamous cell carcinoma of skin     Varicose vein of leg     laser and injection tx      PSH:   Past Surgical History:   Procedure Laterality Date     SECTION      ;     COLONOSCOPY N/A 3/25/2024    Procedure: COLONOSCOPY;  Surgeon: Rasheed Garcia MD;  Location: [facility] ENDOSCOPY;  Service: Endoscopy;  Laterality: N/A;  Ref by Dr LEROY Ryan,  [facility]- Ozempic-hold x 7 days prior procedure, PEG, portal - PC  3/18- lvm and portal msg for pc- pt returned call, last dose of Ozempic was 2 months ago, pc complete. DBM    EXAMINATION UNDER ANESTHESIA N/A 2024    Procedure: EXAM UNDER ANESTHESIA EXCISION ANAL LESION;  Surgeon: Wild Nicholson MD;  Location: [facility] OR 2ND FLR;  Service: Colon and Rectal;  Laterality: N/A;    EXCISION OF RECTAL MASS  2024    Procedure: EXCISION, MASS, RECTUM;  Surgeon: Wild Nicholson MD;  Location: [facility] OR 2ND FLR;  Service: Colon and Rectal;;    HYSTERECTOMY      SKIN BIOPSY      lymphomatoid papulosis    TUBAL LIGATION Bilateral     VARICOSE VEIN  "SURGERY Right     laser and injection     Allergies: She is allergic to lisinopril, erythromycin, morphine, and adhesive.  Social History: Marital Status: . She  reports that she quit smoking about 2 years ago. Her smoking use included cigarettes. She has never used smokeless tobacco.. She  reports current alcohol use..       Exam:  VITAL SIGNS:   Vitals:    04/26/24 0441 04/26/24 0621   BP: (!) 142/80 122/77   BP Location:  Right arm   Patient Position:  Sitting   Pulse: 82 76   Resp: 18 16   Temp: 98.4 °F (36.9 °C) 97.6 °F (36.4 °C)   TempSrc:  Oral   SpO2: 95% 98%   Weight: 89.4 kg (197 lb)    Height: 5' 6" (1.676 m)      Const: Awake and alert, NAD   Head: Atraumatic  Eyes: Normal Conjunctiva  ENT: Normal External Ears, Nose and Mouth.  Neck: Full range of motion. No meningismus.  Resp: Normal respiratory effort, No distress  Cardio: Equal and intact distal pulses  Abd: Soft, non tender, non distended.  No CVA tenderness to palpation   Skin: No petechiae or rashes  Ext: No cyanosis, or edema  Neur: Awake and alert  Psych: Normal Mood and Affect    Data:  Results for orders placed or performed during the hospital encounter of 04/26/24   CBC auto differential   Result Value Ref Range    WBC 7.87 3.90 - 12.70 K/uL    RBC 4.23 4.00 - 5.40 M/uL    Hemoglobin 12.1 12.0 - 16.0 g/dL    Hematocrit 38.6 37.0 - 48.5 %    MCV 91 82 - 98 fL    MCH 28.6 27.0 - 31.0 pg    MCHC 31.3 (L) 32.0 - 36.0 g/dL    RDW 13.9 11.5 - 14.5 %    Platelets 203 150 - 450 K/uL    MPV 10.4 9.2 - 12.9 fL    Immature Granulocytes 0.3 0.0 - 0.5 %    Gran # (ANC) 6.1 1.8 - 7.7 K/uL    Immature Grans (Abs) 0.02 0.00 - 0.04 K/uL    Lymph # 1.3 1.0 - 4.8 K/uL    Mono # 0.4 0.3 - 1.0 K/uL    Eos # 0.1 0.0 - 0.5 K/uL    Baso # 0.04 0.00 - 0.20 K/uL    nRBC 0 0 /100 WBC    Gran % 77.1 (H) 38.0 - 73.0 %    Lymph % 16.4 (L) 18.0 - 48.0 %    Mono % 4.8 4.0 - 15.0 %    Eosinophil % 0.9 0.0 - 8.0 %    Basophil % 0.5 0.0 - 1.9 %    Differential Method " Automated    Comprehensive metabolic panel   Result Value Ref Range    Sodium 137 136 - 145 mmol/L    Potassium 4.2 3.5 - 5.1 mmol/L    Chloride 107 95 - 110 mmol/L    CO2 21 (L) 23 - 29 mmol/L    Glucose 155 (H) 70 - 110 mg/dL    BUN 20 6 - 20 mg/dL    Creatinine 0.8 0.5 - 1.4 mg/dL    Calcium 9.0 8.7 - 10.5 mg/dL    Total Protein 7.1 6.0 - 8.4 g/dL    Albumin 3.7 3.5 - 5.2 g/dL    Total Bilirubin 0.3 0.1 - 1.0 mg/dL    Alkaline Phosphatase 75 55 - 135 U/L    AST 22 10 - 40 U/L    ALT 20 10 - 44 U/L    eGFR >60.0 >60 mL/min/1.73 m^2    Anion Gap 9 8 - 16 mmol/L   Urinalysis, Reflex to Urine Culture Urine, Clean Catch    Specimen: Urine   Result Value Ref Range    Specimen UA Urine, Clean Catch     Color, UA Yellow Yellow, Straw, Lilly    Appearance, UA Clear Clear    pH, UA 6.0 5.0 - 8.0    Specific Gravity, UA 1.025 1.005 - 1.030    Protein, UA Negative Negative    Glucose, UA Negative Negative    Ketones, UA Negative Negative    Bilirubin (UA) Negative Negative    Occult Blood UA 1+ (A) Negative    Nitrite, UA Negative Negative    Leukocytes, UA Negative Negative   Urinalysis Microscopic   Result Value Ref Range    RBC, UA 23 (H) 0 - 4 /hpf    WBC, UA 2 0 - 5 /hpf    Bacteria Rare None-Occ /hpf    Squam Epithel, UA 1 /hpf    Microscopic Comment SEE COMMENT    ISTAT PROCEDURE   Result Value Ref Range    POC Glucose 162 (H) 70 - 110 mg/dL    POC BUN 24 6 - 30 mg/dL    POC Creatinine 0.7 0.5 - 1.4 mg/dL    POC Sodium 138 136 - 145 mmol/L    POC Potassium 4.2 3.5 - 5.1 mmol/L    POC Chloride 106 95 - 110 mmol/L    POC TCO2 (MEASURED) 25 23 - 29 mmol/L    POC Ionized Calcium 1.14 1.06 - 1.42 mmol/L    POC Hematocrit 37 36 - 54 %PCV    Sample DARLINE      Imaging Results               CT Renal Stone Study ABD Pelvis WO (Final result)  Result time 04/26/24 06:40:08      Final result by Parviz Lay MD (04/26/24 06:40:08)                   Impression:      5 mm obstructing stone at the left ureterovesicular junction with  mild left hydroureteronephrosis.    Additional 4 mm nonobstructing stone left kidney.    Right adrenal adenoma.    Cholelithiasis.    This report was flagged in Epic as abnormal.    Electronically signed by resident: Ra Medina  Date:    04/26/2024  Time:    06:05    Electronically signed by: Parviz Lay MD  Date:    04/26/2024  Time:    06:40               Narrative:    EXAMINATION:  CT RENAL STONE STUDY ABD PELVIS WO    CLINICAL HISTORY:  Flank pain, kidney stone suspected;    TECHNIQUE:  Low dose axial images, sagittal and coronal reformations were obtained from the lung bases to the pubic symphysis.  Contrast was not administered.    COMPARISON:  None    FINDINGS:  Heart: Normal in size.  No pericardial effusion.    Lung Bases: Mild left basilar atelectasis.  No focal consolidation.  No pleural fluid.    Liver: Normal in size and contour.  No focal hepatic lesions although lack of intravenous contrast limits assessment.    Gallbladder: 4.1 cm calcified gallstone.  Possible gallbladder sludge.  No gallbladder wall thickening.    Bile Ducts: No evidence of dilated ducts.    Pancreas: No mass or peripancreatic fat stranding.    Spleen: Unremarkable.    Adrenals: 1.3 cm right adrenal adenoma.  Left adrenal gland unremarkable.    Kidneys/Ureters: Kidneys normal in size and location.  Mild bilateral perinephric stranding, left greater than right and nonspecific.  5 mm obstructing stone at the left ureterovesicular junction (series 601, image 71).  Mild left hydroureteronephrosis.  Mild left periureteral stranding.  Additional 4 mm stone in the left kidney.  No right renal stones.  Bilateral renal cysts and suspected renal sinus cysts.  No right hydronephrosis or ureteral dilation.    Bladder: No evidence of wall thickening.    Reproductive organs: Hysterectomy.    GI Tract/Mesentery: No evidence of bowel obstruction or inflammation.  Normal appendix.  Moderate stool burden throughout the colon suggestive of  constipation.    Peritoneum: No intraperitoneal free air or fluid.    Lymph nodes: No lymphadenopathy.    Vasculature: No aneurysm.  Mild calcific atherosclerosis.    Abdominal wall: Small fat containing umbilical hernia.    Bones: No acute fracture.                                    Labs & Imaging studies were reviewed independently by me.     Medical Decision Makin-year-old female presenting to the emergency department with flank pain since last night.  She does report a history of kidney stones in the past, and reports she has had difficulty passing a 6 mm stone and requests CT imaging to evaluate for size of stone.  CT shows a 5 mm stone at her left UPJ with mild hydronephrosis.  Kidney function is normal.  No signs of infection currently in her urinalysis.  Her pain is well-controlled following subsequent Toradol administration.  She will be prescribed Flomax in his strainer as well as further oxycodone for pain control at home.  She was referred to Urology and instructed for a trial of passage.  I do believe this journal passed without incident as she has passed a 6 mm stone before.  Very strict return precautions were discussed, and patient and mother are agreeable with the plan.    Clinical Impression:  1. Ureterolithiasis    2. Left flank pain             Medication List        START taking these medications      oxyCODONE-acetaminophen 5-325 mg per tablet  Commonly known as: PERCOCET  Take 1 tablet by mouth every 6 (six) hours as needed for Pain.     tamsulosin 0.4 mg Cap  Commonly known as: FLOMAX  Take 1 capsule (0.4 mg total) by mouth once daily.            ASK your doctor about these medications      ALPRAZolam 0.25 MG tablet  Commonly known as: XANAX  Take 1 tablet (0.25 mg total) by mouth 2 (two) times daily as needed for Anxiety.     * clobetasoL 0.05 % cream  Commonly known as: TEMOVATE     * clobetasol 0.05% 0.05 % Oint  Commonly known as: TEMOVATE  AAA on the trunk and extremities BID x4-6  weeks then D/C. Restart PRN for flares     estradioL 1 MG tablet  Commonly known as: ESTRACE     FINACEA 15 % Foam  Generic drug: azelaic acid     hydroCHLOROthiazide 25 MG tablet  Commonly known as: HYDRODIURIL     hydrocortisone 2.5 % rectal cream  Commonly known as: ANUSOL-HC  Place rectally 2 (two) times daily.     metFORMIN 500 MG tablet  Commonly known as: GLUCOPHAGE  Take 1 tablet (500 mg total) by mouth 2 (two) times daily with meals.     oxyCODONE 5 MG immediate release tablet  Commonly known as: ROXICODONE  Take 1 tablet (5 mg total) by mouth every 6 (six) hours as needed for Pain.     pramoxine-hydrocortisone 1-1 % rectal cream  Commonly known as: PROCTOCREAM-HC  Place rectally 3 (three) times daily.     venlafaxine 75 MG 24 hr capsule  Commonly known as: EFFEXOR-XR  Take 1 capsule (75 mg total) by mouth once daily.           * This list has 2 medication(s) that are the same as other medications prescribed for you. Read the directions carefully, and ask your doctor or other care provider to review them with you.                   Where to Get Your Medications        These medications were sent to [facility] [id number] [facility] - [place] - [address] [address]  [address], [place] [address]      Phone: [phone]   oxyCODONE-acetaminophen 5-325 mg per tablet  tamsulosin 0.4 mg Cap         Follow-up Information       [facility] [address]    Specialty: Urology  Contact information:  [address]  [facility] [address] [phone]  Additional information:  Main Building, 4th Floor   Please park in [facility] and take [facility] elevator                             Yelena Mccabe MD  04/26/24 5492

## 2024-04-26 NOTE — ED TRIAGE NOTES
Chief Complaint   Patient presents with    Flank Pain     Pt complaining of left sided flank pain. Pt has a history of kidney stones and states this feels similar. Denies N/V     Took leftover PRNs from surgery pta, resolved pain    APPEARANCE: No acute distress.    NEURO: Awake, alert, appropriate for age  HEENT: Head symmetrical. No obvious deformity  RESPIRATORY: Airway is open and patent. Respirations are spontaneous on room air.   NEUROVASCULAR: All extremities are warm and pink with capillary refill less than 3 seconds.   MUSCULOSKELETAL: Moves all extremities, wiggling toes and moving hands.   SKIN: Warm and dry, adequate turgor, mucus membranes moist and pink  SOCIAL: Patient is accompanied by family.   Will continue to monitor.

## 2024-05-09 ENCOUNTER — OFFICE VISIT (OUTPATIENT)
Dept: UROLOGY | Facility: CLINIC | Age: 54
End: 2024-05-09
Payer: COMMERCIAL

## 2024-05-09 ENCOUNTER — LAB VISIT (OUTPATIENT)
Dept: LAB | Facility: HOSPITAL | Age: 54
End: 2024-05-09
Attending: UROLOGY
Payer: COMMERCIAL

## 2024-05-09 VITALS
BODY MASS INDEX: 32.66 KG/M2 | WEIGHT: 203.25 LBS | HEIGHT: 66 IN | HEART RATE: 79 BPM | DIASTOLIC BLOOD PRESSURE: 83 MMHG | SYSTOLIC BLOOD PRESSURE: 136 MMHG

## 2024-05-09 DIAGNOSIS — N20.1 URETEROLITHIASIS: Primary | ICD-10-CM

## 2024-05-09 DIAGNOSIS — D35.01 ADRENAL ADENOMA, RIGHT: ICD-10-CM

## 2024-05-09 LAB
BILIRUB SERPL-MCNC: NEGATIVE MG/DL
BLOOD URINE, POC: ABNORMAL
CLARITY, POC UA: CLEAR
COLOR, POC UA: ABNORMAL
GLUCOSE UR QL STRIP: NORMAL
KETONES UR QL STRIP: NEGATIVE
LEUKOCYTE ESTERASE URINE, POC: NEGATIVE
NITRITE, POC UA: NEGATIVE
PH, POC UA: 7
PROTEIN, POC: ABNORMAL
SPECIFIC GRAVITY, POC UA: 1.01
UROBILINOGEN, POC UA: NORMAL

## 2024-05-09 PROCEDURE — 3075F SYST BP GE 130 - 139MM HG: CPT | Mod: CPTII,S$GLB,, | Performed by: UROLOGY

## 2024-05-09 PROCEDURE — 99204 OFFICE O/P NEW MOD 45 MIN: CPT | Mod: S$GLB,,, | Performed by: UROLOGY

## 2024-05-09 PROCEDURE — 99999 PR PBB SHADOW E&M-EST. PATIENT-LVL V: CPT | Mod: PBBFAC,,, | Performed by: UROLOGY

## 2024-05-09 PROCEDURE — 1159F MED LIST DOCD IN RCRD: CPT | Mod: CPTII,S$GLB,, | Performed by: UROLOGY

## 2024-05-09 PROCEDURE — 82088 ASSAY OF ALDOSTERONE: CPT | Performed by: UROLOGY

## 2024-05-09 PROCEDURE — 3079F DIAST BP 80-89 MM HG: CPT | Mod: CPTII,S$GLB,, | Performed by: UROLOGY

## 2024-05-09 PROCEDURE — 81002 URINALYSIS NONAUTO W/O SCOPE: CPT | Mod: S$GLB,,, | Performed by: UROLOGY

## 2024-05-09 PROCEDURE — 83835 ASSAY OF METANEPHRINES: CPT | Performed by: UROLOGY

## 2024-05-09 PROCEDURE — 84244 ASSAY OF RENIN: CPT | Performed by: UROLOGY

## 2024-05-09 PROCEDURE — 3008F BODY MASS INDEX DOCD: CPT | Mod: CPTII,S$GLB,, | Performed by: UROLOGY

## 2024-05-09 PROCEDURE — 1160F RVW MEDS BY RX/DR IN RCRD: CPT | Mod: CPTII,S$GLB,, | Performed by: UROLOGY

## 2024-05-09 NOTE — PROGRESS NOTES
Subjective:       Patient ID: Isaias Collado is a 54 y.o. female.    Chief Complaint: Ureterolithiasis     This is a 54 y.o.  female patient that is new to me.  The patient was referred to me by Dr. Mcacbe for left ureteral stone, kidney stone, adrenal adenoma.  Flank pain and ED visit 4/26/24.  Labs ok, UA only blood.   CT with left 5mm UVJ stone with mild hydronephrosis, left 4mm non obstructing stone.  Right 1.3 cm adrenal adenoma.    Had a prior 24 urine in 2017, low volume but does not recall other results. Serum Ca normal.     Also complains of urinary frequency, urgency, occasional UUI. Has tried and failed ditropan.        Lab Results   Component Value Date    CREATININE 0.8 04/26/2024       ---  PMH/PSH/Medications/Allergies/Social history reviewed and as in chart.    Review of Systems    Objective:      Physical Exam  Vitals reviewed.   Constitutional:       General: She is not in acute distress.     Appearance: Normal appearance.   HENT:      Head: Normocephalic and atraumatic.      Right Ear: External ear normal.      Left Ear: External ear normal.      Nose: Nose normal.      Mouth/Throat:      Mouth: Mucous membranes are moist.   Eyes:      Extraocular Movements: Extraocular movements intact.   Cardiovascular:      Rate and Rhythm: Normal rate.   Pulmonary:      Effort: Pulmonary effort is normal. No respiratory distress.   Abdominal:      General: There is no distension.      Palpations: Abdomen is soft.   Musculoskeletal:         General: Normal range of motion.      Cervical back: Normal range of motion.   Skin:     General: Skin is warm and dry.   Neurological:      General: No focal deficit present.      Mental Status: She is alert.   Psychiatric:         Mood and Affect: Mood normal.         Behavior: Behavior normal.         Assessment:     Problem Noted   Ureterolithiasis 5/9/2024   Adrenal Adenoma, Right 5/9/2024       Plan:     For her ureteral stone, her pain has resolved but she has not been  straining her urine. She is going on a trip to [place] in 3 weeks, will plan for repeat scan in one week  For her adenoma, she had a normal AM cortisol. Will obtain ARR and metanephrines.  Will plan to repeat a 24 hour urine in a few weeks after CT scan and ureteral stone resolved  Will plan to start myrbetriq after ureteral stone resolved as it could be exacerbating her symptoms    Trino Young MD    The above referenced imaging and interpretations were personally reviewed.  Disclaimer: This note has been generated using voice-recognition software. There may be typographical errors that have been missed during proof-reading.

## 2024-05-13 LAB
ALDOST SERPL-MCNC: 22.7 NG/DL
RENIN PLAS-CCNC: 0.9 NG/ML/H

## 2024-05-16 ENCOUNTER — HOSPITAL ENCOUNTER (OUTPATIENT)
Dept: RADIOLOGY | Facility: HOSPITAL | Age: 54
Discharge: HOME | End: 2024-05-16
Attending: UROLOGY
Payer: COMMERCIAL

## 2024-05-16 DIAGNOSIS — N20.1 URETEROLITHIASIS: ICD-10-CM

## 2024-05-16 PROCEDURE — 74176 CT ABD & PELVIS W/O CONTRAST: CPT | Mod: 26,,, | Performed by: RADIOLOGY

## 2024-05-16 PROCEDURE — 74176 CT ABD & PELVIS W/O CONTRAST: CPT | Mod: TC

## 2024-05-18 LAB
METANEPH FREE SERPL-MCNC: <25 PG/ML
METANEPHS SERPL-MCNC: 84 PG/ML
NORMETANEPH FREE SERPL-MCNC: 84 PG/ML

## 2024-05-23 ENCOUNTER — OFFICE VISIT (OUTPATIENT)
Dept: UROLOGY | Facility: CLINIC | Age: 54
End: 2024-05-23
Payer: COMMERCIAL

## 2024-05-23 ENCOUNTER — HOSPITAL ENCOUNTER (OUTPATIENT)
Dept: RADIOLOGY | Facility: HOSPITAL | Age: 54
Discharge: HOME | End: 2024-05-23
Attending: UROLOGY
Payer: COMMERCIAL

## 2024-05-23 VITALS
HEIGHT: 66 IN | WEIGHT: 204.81 LBS | DIASTOLIC BLOOD PRESSURE: 81 MMHG | HEART RATE: 92 BPM | BODY MASS INDEX: 32.92 KG/M2 | SYSTOLIC BLOOD PRESSURE: 138 MMHG

## 2024-05-23 DIAGNOSIS — N20.0 KIDNEY STONE: ICD-10-CM

## 2024-05-23 DIAGNOSIS — N20.0 KIDNEY STONE: Primary | ICD-10-CM

## 2024-05-23 PROCEDURE — 99214 OFFICE O/P EST MOD 30 MIN: CPT | Mod: S$GLB,,, | Performed by: UROLOGY

## 2024-05-23 PROCEDURE — 1160F RVW MEDS BY RX/DR IN RCRD: CPT | Mod: CPTII,S$GLB,, | Performed by: UROLOGY

## 2024-05-23 PROCEDURE — 3008F BODY MASS INDEX DOCD: CPT | Mod: CPTII,S$GLB,, | Performed by: UROLOGY

## 2024-05-23 PROCEDURE — 3079F DIAST BP 80-89 MM HG: CPT | Mod: CPTII,S$GLB,, | Performed by: UROLOGY

## 2024-05-23 PROCEDURE — 3075F SYST BP GE 130 - 139MM HG: CPT | Mod: CPTII,S$GLB,, | Performed by: UROLOGY

## 2024-05-23 PROCEDURE — 1159F MED LIST DOCD IN RCRD: CPT | Mod: CPTII,S$GLB,, | Performed by: UROLOGY

## 2024-05-23 PROCEDURE — 74018 RADEX ABDOMEN 1 VIEW: CPT | Mod: TC,FY

## 2024-05-23 PROCEDURE — 74018 RADEX ABDOMEN 1 VIEW: CPT | Mod: 26,,, | Performed by: RADIOLOGY

## 2024-05-23 PROCEDURE — 99999 PR PBB SHADOW E&M-EST. PATIENT-LVL IV: CPT | Mod: PBBFAC,,, | Performed by: UROLOGY

## 2024-05-23 NOTE — PROGRESS NOTES
Subjective:       Patient ID: Isaias Collado is a 54 y.o. female.    Chief Complaint: Results (CT/labs)     This is a 54 y.o.  female patient that is new to me.  The patient was referred to me by Dr. Mccabe for left ureteral stone, kidney stone, adrenal adenoma.  Flank pain and ED visit 4/26/24.  Labs ok, UA only blood.   CT with left 5mm UVJ stone with mild hydronephrosis, left 4mm non obstructing stone.  Right 1.3 cm adrenal adenoma.    Had a prior 24 urine in 2017, low volume but does not recall other results. Serum Ca normal.     Also complains of urinary frequency, urgency, occasional UUI. Has tried and failed ditropan.  Adrenal labs negative 5/2024 for adrenal adenoma.    CT 5/2024: passed left UVJ stone, left 4mm non obstructing stone, no hydronephrosis.  Phleboliths.      No pain.  Wishes to check KUB to see if ESWL option.  Left non obstructing stone , SSD ok.         Lab Results   Component Value Date    CREATININE 0.8 04/26/2024       ---  PMH/PSH/Medications/Allergies/Social history reviewed and as in chart.    Review of Systems    Objective:      Physical Exam  Vitals reviewed.   Constitutional:       General: She is not in acute distress.     Appearance: Normal appearance.   HENT:      Head: Normocephalic and atraumatic.      Right Ear: External ear normal.      Left Ear: External ear normal.      Nose: Nose normal.      Mouth/Throat:      Mouth: Mucous membranes are moist.   Eyes:      Extraocular Movements: Extraocular movements intact.   Cardiovascular:      Rate and Rhythm: Normal rate.   Pulmonary:      Effort: Pulmonary effort is normal. No respiratory distress.   Abdominal:      General: There is no distension.      Palpations: Abdomen is soft.   Musculoskeletal:         General: Normal range of motion.      Cervical back: Normal range of motion.   Skin:     General: Skin is warm and dry.   Neurological:      General: No focal deficit present.      Mental Status: She is alert.   Psychiatric:          Mood and Affect: Mood normal.         Behavior: Behavior normal.         Assessment:     Problem Noted   Kidney Stone 5/9/2024   Adrenal Adenoma, Right 5/9/2024       Plan:     Adrenal labs reviewed, normal  Left stone passed.  Wishes to consider left ESWL of non obstructing stone.  Will check KUB, if not seen pt wants to likely observe rather than do ureteroscopy.   Check KUB will message with results.        Trino Young MD    The above referenced imaging and interpretations were personally reviewed.  Disclaimer: This note has been generated using voice-recognition software. There may be typographical errors that have been missed during proof-reading.

## 2024-05-24 DIAGNOSIS — N20.0 KIDNEY STONE: Primary | ICD-10-CM

## 2024-06-10 ENCOUNTER — PATIENT MESSAGE (OUTPATIENT)
Dept: INTERNAL MEDICINE | Facility: CLINIC | Age: 54
End: 2024-06-10
Payer: COMMERCIAL

## 2024-06-27 ENCOUNTER — HOSPITAL ENCOUNTER (OUTPATIENT)
Dept: PREADMISSION TESTING | Facility: HOSPITAL | Age: 54
Discharge: HOME | End: 2024-06-27
Attending: NURSE PRACTITIONER
Payer: COMMERCIAL

## 2024-06-27 NOTE — PRE-PROCEDURE INSTRUCTIONS
Arranging ride home.    Allergies, medical, surgical, family and psychosocial histories reviewed with patient. Periop plan of care reviewed. Preop instructions given, including medications to take and to hold. Hibiclens soap and instructions on use given. Time allotted for questions to be addressed.      Arrival time 1200.    Instructed to take Venlafaxine the morning of surgery.

## 2024-06-27 NOTE — DISCHARGE INSTRUCTIONS
Your surgery is scheduled for 7/11/24.    Please report to [facility] on the 1st Floor at 1200 p.m.    THIS TIME IS SUBJECT TO CHANGE.  YOU WILL RECEIVE A PHONE CALL THE DAY BEFORE SURGERY BY 3:30 PM TO CONFIRM YOUR TIME OF ARRIVAL.  IF YOU HAVE NOT RECEIVED A PHONE CALL BY 3:30 PM THE DAY BEFORE YOUR SURGERY PLEASE CALL [phone]     INSTRUCTIONS IMPORTANT!!!  ¨ Do not eat or drink 8 hours prior to arrival time-including water, candy, gum, & mints. OK to brush teeth.      Please take Venlafaxine the morning of surgery.           ____  Do not wear makeup, including mascara.  ____  No powder, lotions or creams to surgical area.  ____  Please remove all jewelry, including piercings and leave at home.  ____  No money or valuables needed. Please leave at home.  ____  Please bring any documents given by your doctor.  ____  If going home the same day, arrange for a ride home. You will not be able to             drive if Anesthesia was used.  ____  Children under 18 years require a parent / guardian present the entire time             they are in surgery / recovery.  ____  Wear loose fitting clothing. Allow for dressings, bandages.  ____  Stop Aspirin and blood thinners as directed by prescribing provider.  ____  Do not take any herbal, vitamins, and or supplements 2 weeks prior to surgery.  ____  Stop NSAIDS (Naproxen, Aleve, Voltaren, Celebrex, Melxoicam), Goody's, and BC powder 7 days prior to surgery.  ____  Wash the surgical area with Hibiclens or Dial Antibacterial bar soap the night before surgery, and again the             morning of surgery.  Be sure to rinse hibiclens or Dial Antibacterial bar soap off completely (if instructed by   nurse).  ____  If you take diabetic medication including Metformin, Glimepiride, Glipizide, Glyburide, Byetta, Januvia, Actos, do not take am of surgery unless            instructed by Doctor.  ____  Hold Invokana, Farxiga, and Jardiance for 3 days prior to surgery.    ____  Call MD for temperature above 101 degrees or any other signs of infection such as Urinary (bladder) infection, Upper respiratory infection, skin boils,             etc.  ____ Do Not wear your contact lenses the day of your procedure.  You may wear your glasses.      ____ Do not shave surgical site for 3 days prior to surgery.  ____ Practice Good hand washing before, during, and after procedure.      I have read or had read and explained to me, and understand the above information.  Additional comments or instructions:  For additional questions call [phone]      ANESTHESIA SIDE EFFECTS  -For the first 24 hours after surgery:  Do not drive, use heavy equipment, make important decisions, or drink alcohol  -It is normal to feel sleepy for several hours.  Rest until you are more awake.  -Have someone stay with you, if needed.  They can watch for problems and help keep you safe.  -Some possible post anesthesia side effects include: nausea and vomiting, sore throat and hoarseness, sleepiness, and dizziness.        Pre-Op Bathing Instructions    Before surgery, you can play an important role in your own health.    Because skin is not sterile, we need to be sure that your skin is as free of germs as possible. By following the instructions below, you can reduce the number of germs on your skin before surgery.    IMPORTANT: You will need to shower with a special soap called Hibiclens*, available at any pharmacy.  If you are allergic to Chlorhexidine (the antiseptic in Hibiclens), use an antibacterial soap such as Dial Soap for your preoperative shower.  You will shower with Hibiclens both the night before your surgery and the morning of your surgery.  Do not use Hibiclens on the head, face or genitals to avoid injury to those areas.    STEP #1: THE NIGHT BEFORE YOUR SURGERY     Do not shave the area of your body where your surgery will be performed.  Shower and wash your hair and body as usual with your normal soap  and shampoo.  Rinse your hair and body thoroughly after you shower to remove all soap residue.  With your hand, apply one packet of Hibiclens soap to the surgical site.   Wash the site gently for five (5) minutes. Do not scrub your skin too hard.   Do not wash with your regular soap after Hibiclens is used.  Rinse your body thoroughly.  Pat yourself dry with a clean, soft towel.  Do not use lotion, cream, or powder.  Wear clean clothes.    STEP #2: THE MORNING OF YOUR SURGERY     Repeat Step #1.    * Not to be used by people allergic to Chlorhexidine.

## 2024-07-01 ENCOUNTER — OFFICE VISIT (OUTPATIENT)
Dept: PSYCHIATRY | Facility: CLINIC | Age: 54
End: 2024-07-01
Payer: COMMERCIAL

## 2024-07-01 DIAGNOSIS — F33.1 MODERATE EPISODE OF RECURRENT MAJOR DEPRESSIVE DISORDER: ICD-10-CM

## 2024-07-01 DIAGNOSIS — F41.1 GENERALIZED ANXIETY DISORDER WITH PANIC ATTACKS: Primary | ICD-10-CM

## 2024-07-01 DIAGNOSIS — F41.0 GENERALIZED ANXIETY DISORDER WITH PANIC ATTACKS: Primary | ICD-10-CM

## 2024-07-01 PROCEDURE — 99999 PR PBB SHADOW E&M-EST. PATIENT-LVL I: CPT | Mod: PBBFAC,,, | Performed by: SOCIAL WORKER

## 2024-07-01 PROCEDURE — 90837 PSYTX W PT 60 MINUTES: CPT | Mod: S$GLB,,, | Performed by: SOCIAL WORKER

## 2024-07-01 PROCEDURE — 90785 PSYTX COMPLEX INTERACTIVE: CPT | Mod: S$GLB,,, | Performed by: SOCIAL WORKER

## 2024-07-01 PROCEDURE — 1159F MED LIST DOCD IN RCRD: CPT | Mod: CPTII,S$GLB,, | Performed by: SOCIAL WORKER

## 2024-07-01 NOTE — PROGRESS NOTES
Individual Psychotherapy (LCSW/PhD)  Isaias Collado,  7/1/2024    Site:  [facility]         Therapeutic Intervention: Met with patient for individual psychotherapy.    Chief complaint/reason for encounter: depression and anxiety     Interval history and content of current session: Pt reported they went to [place]. Pt reported right before the trip, her daughter asked for a divorce; which affected the trip. Pt reported she does not know, if it is going to stick. PT reported her son is there for Summer, but she believes he is coming back. She reported being in the middle of renovations. 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 She reported she has been doing [employer].   We discussed goals for therapy and strategies to alleviate anxiety. Handouts provided. Therapist also booked pt through August to prevent care gaps. She denies SI, no HI or AVH.     Treatment plan:  Target symptoms: depression, anxiety   Why chosen therapy is appropriate versus another modality: relevant to diagnosis, patient responds to this modality, evidence based practice  Outcome monitoring methods: self-report, observation, checklist/rating scale  Therapeutic intervention type: insight oriented psychotherapy, behavior modifying psychotherapy, supportive psychotherapy    Risk parameters:  Patient reports no suicidal ideation  Patient reports no homicidal ideation  Patient reports no self-injurious behavior  Patient reports no violent behavior    Verbal deficits: None    Patient's response to intervention:  The patient's response to intervention is accepting, motivated.    Progress toward goals and other mental status changes:  The patient's progress toward goals is fair .    Diagnosis:     ICD-10-CM ICD-9-CM   1. Generalized anxiety disorder with panic attacks  F41.1 300.02    F41.0 300.01   2. Moderate episode of recurrent major depressive disorder  F33.1 296.32       Plan: Pt plans to continue individual  psychotherapy    Return to clinic: as scheduled    Length of Service (minutes): 60

## 2024-07-10 ENCOUNTER — ANESTHESIA EVENT (OUTPATIENT)
Dept: SURGERY | Facility: HOSPITAL | Age: 54
End: 2024-07-10
Payer: COMMERCIAL

## 2024-07-10 RX ORDER — GLUCAGON 1 MG
1 KIT INJECTION
OUTPATIENT
Start: 2024-07-10

## 2024-07-10 RX ORDER — HALOPERIDOL 5 MG/ML
0.5 INJECTION INTRAMUSCULAR EVERY 10 MIN PRN
Status: CANCELLED | OUTPATIENT
Start: 2024-07-10

## 2024-07-10 RX ORDER — FENTANYL CITRATE 50 UG/ML
25 INJECTION, SOLUTION INTRAMUSCULAR; INTRAVENOUS EVERY 5 MIN PRN
Status: CANCELLED | OUTPATIENT
Start: 2024-07-10

## 2024-07-10 RX ORDER — SODIUM CHLORIDE 0.9 % (FLUSH) 0.9 %
10 SYRINGE (ML) INJECTION
Status: CANCELLED | OUTPATIENT
Start: 2024-07-10

## 2024-07-10 NOTE — ANESTHESIA PREPROCEDURE EVALUATION
[facility]  Anesthesia Pre-Operative Evaluation         Patient Name: Isaias Collado  YOB: 1970  MRN: [id number]    SUBJECTIVE:     Pre-operative evaluation for Procedure(s) (LRB):  LITHOTRIPSY, ESWL 60 minutes (Left)     07/10/2024    Isaias Collado is a 54 y.o. female w/ a significant PMHx of Adenomyosis, Basal cell carcinoma, renal calculi, Hypertension, Panic attacks, PCOS,     CT with left 5mm UVJ stone with mild hydronephrosis, left 4mm non obstructing stone. Right 1.3 cm adrenal adenoma.     Patient now presents for the above procedure(s).    Echo Summary  No results found for this or any previous visit.       Prev airway:     Intubation     Date/Time: 2/28/2024 8:10 AM     Performed by: Yessy Carmen CRNA  Authorized by: Yessy Carmen CRNA    Intubation:     Induction:  Intravenous    Intubated:  Postinduction    Mask Ventilation:  Easy mask    Attempts:  1    Attempted By:  CRNA    Method of Intubation:  Video laryngoscopy    Blade:  Stubbs 3    Laryngeal View Grade: Grade I - full view of cords      Difficult Airway Encountered?: No      Complications:  None    Airway Device:  Oral endotracheal tube    Airway Device Size:  7.0    Style/Cuff Inflation:  Cuffed (inflated to minimal occlusive pressure)    Tube secured:  21    Secured at:  The lips    Placement Verified By:  Capnometry    Complicating Factors:  None    Findings Post-Intubation:  BS equal bilateral and atraumatic/condition of teeth unchanged      Patient Active Problem List   Diagnosis    PCOS (polycystic ovarian syndrome)    Prediabetes    Varicose veins of both lower extremities with pain    Venous insufficiency of both lower extremities    Generalized anxiety disorder with panic attacks    Moderate episode of recurrent major depressive disorder    Kidney stone    Adrenal adenoma, right       Review of patient's allergies indicates:   Allergen Reactions    Lisinopril Other (See Comments)     Cough.    Erythromycin  Other (See Comments)     Nausea and cold sweats    Morphine Itching    Adhesive Rash     Paper tape ok       Current Inpatient Medications:      No current facility-administered medications on file prior to encounter.     Current Outpatient Medications on File Prior to Encounter   Medication Sig Dispense Refill    ALPRAZolam (XANAX) 0.25 MG tablet Take 1 tablet (0.25 mg total) by mouth 2 (two) times daily as needed for Anxiety. 60 tablet 0    clobetasoL (TEMOVATE) 0.05 % cream Apply 1 application topically 2 (two) times daily.      clobetasol 0.05% (TEMOVATE) 0.05 % Oint AAA on the trunk and extremities BID x4-6 weeks then D/C. Restart PRN for flares 60 g 2    estradioL (ESTRACE) 1 MG tablet Take 1 mg by mouth.      FINACEA 15 % Foam APPLY DAILY TO SKIN TO AFFECTED AREA EVERY DAY      hydroCHLOROthiazide (HYDRODIURIL) 25 MG tablet Take 25 mg by mouth once daily.      metFORMIN (GLUCOPHAGE) 500 MG tablet Take 1 tablet (500 mg total) by mouth 2 (two) times daily with meals. (Patient not taking: Reported on 2024) 60 tablet 2    venlafaxine (EFFEXOR-XR) 75 MG 24 hr capsule Take 1 capsule (75 mg total) by mouth once daily. 90 capsule 1       Past Surgical History:   Procedure Laterality Date     SECTION      ;     COLONOSCOPY N/A 3/25/2024    Procedure: COLONOSCOPY;  Surgeon: Rasheed Garcia MD;  Location: [facility];  Service: Endoscopy;  Laterality: N/A;  Ref by Dr LEROY Ryan,  Lupe- Ozempic-hold x 7 days prior procedure, PEG, portal - PC  3/18- lvm and portal msg for pc- pt returned call, last dose of Ozempic was 2 months ago, pc complete. DBM    EXAMINATION UNDER ANESTHESIA N/A 2024    Procedure: EXAM UNDER ANESTHESIA EXCISION ANAL LESION;  Surgeon: Wild Nicholson MD;  Location: [facility] OR [address] [place] [address];  Service: Colon and Rectal;  Laterality: N/A;    EXCISION OF RECTAL MASS  2024    Procedure: EXCISION, MASS, RECTUM;  Surgeon: Wild Nicholson MD;  Location: [facility] OR [address] [place] [address];  Service:  Colon and Rectal;;    HYSTERECTOMY      SKIN BIOPSY      lymphomatoid papulosis    TUBAL LIGATION Bilateral     VARICOSE VEIN SURGERY Right     laser and injection       Social History:  Tobacco Use: Medium Risk (5/23/2024)    Patient History     Smoking Tobacco Use: Former     Smokeless Tobacco Use: Never     Passive Exposure: Not on file      Alcohol Use: Unknown (2/14/2024)    AUDIT-C     Frequency of Alcohol Consumption: Not on file     Average Number of Drinks: Patient declined     Frequency of Binge Drinking: Not on file        OBJECTIVE:     Vital Signs Range (Last 24H):  BP: ()/()   Arterial Line BP: ()/()       Significant Labs:  Lab Results   Component Value Date    WBC 7.87 04/26/2024    HGB 12.1 04/26/2024    HCT 38.6 04/26/2024     04/26/2024    CHOL 302 (H) 09/20/2023    TRIG 200 (H) 09/20/2023    HDL 67 09/20/2023    ALT 20 04/26/2024    AST 22 04/26/2024     04/26/2024    K 4.2 04/26/2024     04/26/2024    CREATININE 0.8 04/26/2024    BUN 20 04/26/2024    CO2 21 (L) 04/26/2024    TSH 1.460 09/20/2023    HGBA1C 5.7 (H) 09/20/2023       Diagnostic Studies: No relevant studies.    EKG:   Results for orders placed or performed during the hospital encounter of 02/14/24   EKG 12-lead    Collection Time: 02/14/24  3:58 PM   Result Value Ref Range    QRS Duration 84 ms    OHS QTC Calculation 426 ms    Narrative    Test Reason : K64.8,    Vent. Rate : 074 BPM     Atrial Rate : 074 BPM     P-R Int : 134 ms          QRS Dur : 084 ms      QT Int : 384 ms       P-R-T Axes : 047 039 009 degrees     QTc Int : 426 ms    Normal sinus rhythm  Normal ECG  No previous ECGs available  Confirmed by MONA AGUILLON MD ([id number]) on 2/15/2024 7:49:59 AM    Referred By: MICHELLE VICKERS           Confirmed By:MONA AGUILLON MD       2D ECHO:  TTE:  No results found for this or any previous visit.    ADOLFO:  No results found for this or any previous visit.    ASSESSMENT/PLAN:        Pre-op Assessment    I have reviewed  the Patient Summary Reports.     I have reviewed the Nursing Notes. I have reviewed the NPO Status.   I have reviewed the Medications.     Review of Systems  Anesthesia Hx:  No problems with previous Anesthesia   History of prior surgery of interest to airway management or planning:          Denies Family Hx of Anesthesia complications.    Denies Personal Hx of Anesthesia complications.                    Social:  Former Smoker, Alcohol Use       Cardiovascular:     Hypertension          PVD                              Renal/:   renal calculi               OB/GYN/PEDS:  Polycystic ovarian syndrome           Endocrine:  Diabetes: Pre-Diabetes.           Psych:   anxiety (PANFILO) depression                Physical Exam  General: Well nourished, Cooperative, Alert and Oriented    Airway:  Mallampati: II   Mouth Opening: Normal  TM Distance: Normal  Tongue: Normal  Neck ROM: Normal ROM    Dental:  Intact        Anesthesia Plan  Type of Anesthesia, risks & benefits discussed:    Anesthesia Type: MAC, Gen Natural Airway, Gen Supraglottic Airway  Intra-op Monitoring Plan: Standard ASA Monitors  Post Op Pain Control Plan: multimodal analgesia  Induction:  IV  Airway Plan: , Post-Induction  Informed Consent: Informed consent signed with the Patient and all parties understand the risks and agree with anesthesia plan.  All questions answered. Patient consented to blood products? No  ASA Score: 2  Day of Surgery Review of History & Physical: H&P Update referred to the surgeon/provider.    Ready For Surgery From Anesthesia Perspective.     .

## 2024-07-11 ENCOUNTER — ANESTHESIA (OUTPATIENT)
Dept: SURGERY | Facility: HOSPITAL | Age: 54
End: 2024-07-11
Payer: COMMERCIAL

## 2024-07-11 ENCOUNTER — HOSPITAL ENCOUNTER (OUTPATIENT)
Facility: HOSPITAL | Age: 54
Discharge: HOME | End: 2024-07-11
Attending: UROLOGY | Admitting: UROLOGY
Payer: COMMERCIAL

## 2024-07-11 VITALS
TEMPERATURE: 98 F | DIASTOLIC BLOOD PRESSURE: 81 MMHG | HEART RATE: 81 BPM | OXYGEN SATURATION: 96 % | WEIGHT: 197 LBS | SYSTOLIC BLOOD PRESSURE: 136 MMHG | RESPIRATION RATE: 18 BRPM | HEIGHT: 66 IN | BODY MASS INDEX: 31.66 KG/M2

## 2024-07-11 DIAGNOSIS — N20.0 KIDNEY STONE: ICD-10-CM

## 2024-07-11 PROCEDURE — 25000003 PHARM REV CODE 250

## 2024-07-11 PROCEDURE — 71000015 HC POSTOP RECOV 1ST HR: Performed by: UROLOGY

## 2024-07-11 PROCEDURE — 63600175 PHARM REV CODE 636 W HCPCS

## 2024-07-11 PROCEDURE — 71000033 HC RECOVERY, INTIAL HOUR: Performed by: UROLOGY

## 2024-07-11 PROCEDURE — 36000705 HC OR TIME LEV I EA ADD 15 MIN: Performed by: UROLOGY

## 2024-07-11 PROCEDURE — 36000704 HC OR TIME LEV I 1ST 15 MIN: Performed by: UROLOGY

## 2024-07-11 PROCEDURE — 71000016 HC POSTOP RECOV ADDL HR: Performed by: UROLOGY

## 2024-07-11 PROCEDURE — 37000009 HC ANESTHESIA EA ADD 15 MINS: Performed by: UROLOGY

## 2024-07-11 PROCEDURE — 50590 FRAGMENTING OF KIDNEY STONE: CPT | Mod: LT,,, | Performed by: UROLOGY

## 2024-07-11 PROCEDURE — 37000008 HC ANESTHESIA 1ST 15 MINUTES: Performed by: UROLOGY

## 2024-07-11 RX ORDER — KETOROLAC TROMETHAMINE 10 MG/1
10 TABLET, FILM COATED ORAL EVERY 6 HOURS
Qty: 10 TABLET | Refills: 0 | Status: SHIPPED | OUTPATIENT
Start: 2024-07-11 | End: 2024-07-14

## 2024-07-11 RX ORDER — SUCCINYLCHOLINE CHLORIDE 20 MG/ML
INJECTION INTRAMUSCULAR; INTRAVENOUS
Status: DISCONTINUED | OUTPATIENT
Start: 2024-07-11 | End: 2024-07-11

## 2024-07-11 RX ORDER — GLUCAGON 1 MG
1 KIT INJECTION
Status: DISCONTINUED | OUTPATIENT
Start: 2024-07-11 | End: 2024-07-11 | Stop reason: HOSPADM

## 2024-07-11 RX ORDER — PROPOFOL 10 MG/ML
VIAL (ML) INTRAVENOUS CONTINUOUS PRN
Status: DISCONTINUED | OUTPATIENT
Start: 2024-07-11 | End: 2024-07-11

## 2024-07-11 RX ORDER — OXYCODONE HYDROCHLORIDE 5 MG/1
5 TABLET ORAL
Status: DISCONTINUED | OUTPATIENT
Start: 2024-07-11 | End: 2024-07-11 | Stop reason: HOSPADM

## 2024-07-11 RX ORDER — TAMSULOSIN HYDROCHLORIDE 0.4 MG/1
0.4 CAPSULE ORAL DAILY
Qty: 30 CAPSULE | Refills: 0 | Status: SHIPPED | OUTPATIENT
Start: 2024-07-11 | End: 2024-08-10

## 2024-07-11 RX ORDER — LIDOCAINE HYDROCHLORIDE 20 MG/ML
INJECTION, SOLUTION EPIDURAL; INFILTRATION; INTRACAUDAL; PERINEURAL
Status: DISCONTINUED | OUTPATIENT
Start: 2024-07-11 | End: 2024-07-11

## 2024-07-11 RX ORDER — PHENYLEPHRINE HCL IN 0.9% NACL 1 MG/10 ML
SYRINGE (ML) INTRAVENOUS
Status: DISCONTINUED | OUTPATIENT
Start: 2024-07-11 | End: 2024-07-11

## 2024-07-11 RX ORDER — DEXAMETHASONE SODIUM PHOSPHATE 4 MG/ML
INJECTION, SOLUTION INTRA-ARTICULAR; INTRALESIONAL; INTRAMUSCULAR; INTRAVENOUS; SOFT TISSUE
Status: DISCONTINUED | OUTPATIENT
Start: 2024-07-11 | End: 2024-07-11

## 2024-07-11 RX ORDER — HYDROMORPHONE HYDROCHLORIDE 2 MG/ML
0.2 INJECTION, SOLUTION INTRAMUSCULAR; INTRAVENOUS; SUBCUTANEOUS EVERY 5 MIN PRN
Status: DISCONTINUED | OUTPATIENT
Start: 2024-07-11 | End: 2024-07-11 | Stop reason: HOSPADM

## 2024-07-11 RX ORDER — PROPOFOL 10 MG/ML
VIAL (ML) INTRAVENOUS
Status: DISCONTINUED | OUTPATIENT
Start: 2024-07-11 | End: 2024-07-11

## 2024-07-11 RX ORDER — FENTANYL CITRATE 50 UG/ML
INJECTION, SOLUTION INTRAMUSCULAR; INTRAVENOUS
Status: DISCONTINUED | OUTPATIENT
Start: 2024-07-11 | End: 2024-07-11

## 2024-07-11 RX ORDER — ONDANSETRON HYDROCHLORIDE 2 MG/ML
INJECTION, SOLUTION INTRAVENOUS
Status: DISCONTINUED | OUTPATIENT
Start: 2024-07-11 | End: 2024-07-11

## 2024-07-11 RX ORDER — MIDAZOLAM HYDROCHLORIDE 1 MG/ML
INJECTION INTRAMUSCULAR; INTRAVENOUS
Status: DISCONTINUED | OUTPATIENT
Start: 2024-07-11 | End: 2024-07-11

## 2024-07-11 RX ORDER — SODIUM CHLORIDE 0.9 % (FLUSH) 0.9 %
10 SYRINGE (ML) INJECTION DAILY PRN
Status: DISCONTINUED | OUTPATIENT
Start: 2024-07-11 | End: 2024-07-11 | Stop reason: HOSPADM

## 2024-07-11 RX ORDER — PROCHLORPERAZINE EDISYLATE 5 MG/ML
5 INJECTION INTRAMUSCULAR; INTRAVENOUS EVERY 30 MIN PRN
Status: DISCONTINUED | OUTPATIENT
Start: 2024-07-11 | End: 2024-07-11 | Stop reason: HOSPADM

## 2024-07-11 RX ADMIN — PROPOFOL 50 MG: 10 INJECTION, EMULSION INTRAVENOUS at 11:07

## 2024-07-11 RX ADMIN — MIDAZOLAM HYDROCHLORIDE 2 MG: 1 INJECTION, SOLUTION INTRAMUSCULAR; INTRAVENOUS at 10:07

## 2024-07-11 RX ADMIN — PROPOFOL 200 MG: 10 INJECTION, EMULSION INTRAVENOUS at 11:07

## 2024-07-11 RX ADMIN — PROPOFOL 30 MG: 10 INJECTION, EMULSION INTRAVENOUS at 11:07

## 2024-07-11 RX ADMIN — SODIUM CHLORIDE: 0.9 INJECTION, SOLUTION INTRAVENOUS at 10:07

## 2024-07-11 RX ADMIN — FENTANYL CITRATE 25 MCG: 50 INJECTION INTRAMUSCULAR; INTRAVENOUS at 11:07

## 2024-07-11 RX ADMIN — LIDOCAINE HYDROCHLORIDE 100 MG: 20 INJECTION, SOLUTION EPIDURAL; INFILTRATION; INTRACAUDAL; PERINEURAL at 11:07

## 2024-07-11 RX ADMIN — SUCCINYLCHOLINE CHLORIDE 130 MG: 20 INJECTION, SOLUTION INTRAMUSCULAR; INTRAVENOUS at 11:07

## 2024-07-11 RX ADMIN — Medication 100 MCG: at 11:07

## 2024-07-11 RX ADMIN — DEXAMETHASONE SODIUM PHOSPHATE 4 MG: 4 INJECTION, SOLUTION INTRA-ARTICULAR; INTRALESIONAL; INTRAMUSCULAR; INTRAVENOUS; SOFT TISSUE at 11:07

## 2024-07-11 RX ADMIN — PROPOFOL 50 MCG/KG/MIN: 10 INJECTION, EMULSION INTRAVENOUS at 11:07

## 2024-07-11 RX ADMIN — PROPOFOL 20 MG: 10 INJECTION, EMULSION INTRAVENOUS at 11:07

## 2024-07-11 RX ADMIN — ONDANSETRON 4 MG: 2 INJECTION, SOLUTION INTRAMUSCULAR; INTRAVENOUS at 11:07

## 2024-07-11 NOTE — ANESTHESIA PROCEDURE NOTES
Intubation    Date/Time: 7/11/2024 11:34 AM    Performed by: Denzel Romo DO  Authorized by: Parker Dewitt MD    Intubation:     Induction:  Intravenous    Intubated:  Postinduction    Mask Ventilation:  Easy with oral airway    Attempts:  1    Attempted By:  Resident anesthesiologist    Method of Intubation:  Video laryngoscopy    Blade:  Stubbs 3    Laryngeal View Grade: Grade I - full view of cords      Difficult Airway Encountered?: No      Complications:  None    Airway Device:  Oral endotracheal tube    Airway Device Size:  7.0    Style/Cuff Inflation:  Cuffed    Tube secured:  23    Secured at:  The lips    Placement Verified By:  Capnometry    Complicating Factors:  Anterior larynx    Findings Post-Intubation:  BS equal bilateral and atraumatic/condition of teeth unchanged

## 2024-07-11 NOTE — DISCHARGE INSTRUCTIONS
ES[facility] post-op instructions:  You may see some blood in your urine while the stone fragments are passing and a few days afterward. Do not be alarmed, even if the urine was clear for a while. Push fluids and refrain from strenuous activity until clearing occurs. If you have difficulty passing clots or don't improve, call us. You can also try sitting in a warm tub of water to help to urinate if needed. Avoid medications such as Aspirin, Advil, Motrin, Plavix, or Coumadin, which thin the blood and cause bleeding.  If you have never had your stones analyzed, strain all urine and bring stone fragments with you to your next appointment.  Diet:  You may return to your normal diet immediately. Alcohol, spicy foods, acidy foods and drinks with caffeine may cause irritation or frequency and should be used in moderation. To keep your urine flowing freely and to avoid constipation, drink plenty of fluids during the day (8-10 glasses).  Activity:  While the kidney is healing do not engage in strenuous activity. If you are active, you may see more blood in the urine. We would suggest cutting down your activity under these circumstances until the bleeding has stopped, perhaps two weeks.  Bowels:  It is important to keep your bowels regular during the postoperative period. Straining with bowel movements can cause bleeding. A bowel movement every other day is reasonable. Use a mild over-the-counter laxative if needed, such as Milk of Magnesia 2-3 tablespoons, or 1-2 Dulcolax tablets. Call if you continue to have problems. Narcotics can worsen constipation; if you had been taking narcotics for pain, before, during or after your surgery, you may be constipated. Ditropan for bladder spasms may also cause constipation.  Problems you should report to us:  Fevers over 101.5 degrees Fahrenheit.   Inability to urinate.   Drug reactions (hives, rash, nausea, vomiting, diarrhea)   Severe burning or pain with urination that is not improving.    You will also have some burning with urination. This is normal after stone therapy and is also expected while the stent is in place. This burning should be mild or moderate and should improve over time. Severe burning, especially when it is not improving, can be a sign of infection.  Follow-up  After the stone has been fragmented you will likely need an x-ray prior to next clinic appointment    Bring stone fragments with you to your next appt.     In some select cases it is important to not leave the string on the stent.  The stent is usually removed 1-4 weeks after treatment.    Call Urology at [phone] with any problems     ANESTHESIA  -For the first 24 hours after surgery:  Do not drive, use heavy equipment, make important decisions, or drink alcohol  -It is normal to feel sleepy for several hours.  Rest until you are more awake.  -Have someone stay with you, if needed.  They can watch for problems and help keep you safe.  -Some possible post anesthesia side effects include: nausea and vomiting, sore throat and hoarseness, sleepiness, and dizziness.    PAIN  -If you have pain after surgery, pain medicine will help you feel better.  Take it as directed, before pain becomes severe.  Most pain relievers taken by mouth need at least 20-30 minutes to start working.  -Do not drive or drink alcohol while taking pain medicine.  -Pain medication can upset your stomach.  Taking them with a little food may help.  -Other ways to help control pain: elevation, ice, and relaxation  -Call your surgeon if still having unmanageable pain an hour after taking pain medicine.  -Pain medicine can cause constipation.  Taking an over-the counter stool softener while on prescription pain medicine and drinking plenty of fluids can prevent this side effect.  -Call your surgeon if you have severe side effects like: breathing problems, trouble waking up, dizziness, confusion, or severe constipation.    NAUSEA  -Some people have nausea after  surgery.  This is often because of anesthesia, pain, pain medicine, or the stress of surgery.  -Do not push yourself to eat.  Start off with clear liquids and soup.  Slowly move to solid foods.  Don't eat fatty, rich, spicy foods at first.  Eat smaller amounts.  -If you develop persistent nausea and vomiting please notify your surgeon immediately.    BLEEDING  -Different types of surgery require different types of care and dressing changes.  It is important to follow all instructions and advice from your surgeon.  Change dressing as directed.  Call your surgeon for any concerns regarding postop bleeding.    SIGNS OF INFECTION  -Signs of infection include: fever, swelling, drainage, and redness  -Notify your surgeon if you have a fever of 100.4 F (38.0 C) or higher.  -Notify your surgeon if you notice redness, swelling, increased pain, pus, or a foul smell at the incision site.

## 2024-07-11 NOTE — ANESTHESIA POSTPROCEDURE EVALUATION
Anesthesia Post Evaluation    Patient: Isaias Collado    Procedure(s) Performed: Procedure(s) (LRB):  LITHOTRIPSY, ESWL 60 minutes (Left)    Final Anesthesia Type: general      Patient location during evaluation: PACU  Patient participation: Yes- Able to Participate  Level of consciousness: awake and alert, oriented and awake  Post-procedure vital signs: reviewed and stable  Pain management: adequate  Airway patency: patent  ALENA mitigation strategies: Multimodal analgesia  PONV status at discharge: No PONV  Anesthetic complications: no      Cardiovascular status: blood pressure returned to baseline and hemodynamically stable  Respiratory status: unassisted and spontaneous ventilation  Hydration status: euvolemic  Follow-up not needed.              Vitals Value Taken Time   /80 07/11/24 1250   Temp 35.9 °C (96.6 °F) 07/11/24 1250   Pulse 97 07/11/24 1250   Resp 18 07/11/24 1250   SpO2 96 % 07/11/24 1250         Event Time   Out of Recovery 12:49:00         Pain/Jonathon Score: Jonathon Score: 10 (7/11/2024  1:05 PM)

## 2024-07-11 NOTE — OP NOTE
[facility]  Operative Note    Date: 07/11/2024    Pre-Op Diagnosis: Left renal stone    Post-Op Diagnosis: same    Procedure(s) Performed:   1.  Left ESWL    Specimen(s): none    Staff Surgeon: Trino Young MD    Assistant Surgeon: Jose Noel MD    Anesthesia: Monitored Local Anesthesia with Sedation    Indications: Isaias Collado is a 54 y.o. female with a  left renal stone presenting for definitive stone management.  The stone is radio-opaque on KUB.      Findings:   Uncomplicated left ESWL  Stone appeared to fragment well     Estimated Blood Loss: none    Drains: none    Procedure in Detail:  After risk, benefits, and possible complications of ESWL were discussed, the patient elected to undergo the procedure and informed consent was obtained. All questions were answered in the pre-operative area and the patient was transferred to the lithotripsy suite and placed on the lithotriptor table. SCDs were applied and working.  Time out was preformed, livia-procedural antibiotics were administered.    The patient's Left-sided stone was aligned on the X-Y- and Z axis.  MAC anesthesia was administered.  When the patient was adequately sedated, 3000 thousand shocks were administered at a rate of 1.5 shocks per second. Intermittent fluoroscopy was used to monitor fragmentation of the stone.    At the end of the procedure the stone was less apparent on fluoroscopy.      The patient tolerated the procedure well and was transferred to the PACU in stable condition.      Plan: The patient will follow up with Dr. Young in 3 weeks with a KUB prior.  The patient was given prescriptions for Flomax and Toradol.  The patient will strain all urine and bring any fragments to the follow up appt for stone analysis.      Jose Noel MD

## 2024-07-11 NOTE — PLAN OF CARE
Patient discharge criteria met. IV removed per order with catheter intact. Patient voided per protocol.  Pain well controlled, VSS.  Patient given discharge instructions, verbalized understanding and able to teach back.  No complications noted.

## 2024-07-11 NOTE — H&P
[facility]rology [facility]    HPI:  Isaias Collado is a 54 y.o. female with a left renal stone. Presents today for ESWL.      ROS:  Neg except per HPI    Past Medical History:   Diagnosis Date    Adenomyosis internal 2016    Allergy to ACE inhibitors 2016    Basal cell carcinoma     Dysfunctional uterine bleeding 2016    Hx of renal calculi     Hypertension     no problem since weight loss     Panic attacks     PCOS (polycystic ovarian syndrome) 2016    w/ insulin resistance    Rosacea     Squamous cell carcinoma of skin     Varicose vein of leg     laser and injection tx        Past Surgical History:   Procedure Laterality Date     SECTION      ;     COLONOSCOPY N/A 3/25/2024    Procedure: COLONOSCOPY;  Surgeon: Rasheed Garcia MD;  Location: [facility] ENDOSCOPY;  Service: Endoscopy;  Laterality: N/A;  Ref by Dr LEROY Ryan,  JAYLAMeds- Ozempic-hold x 7 days prior procedure, PEG, portal - PC  3/18- lvm and portal msg for pc- pt returned call, last dose of Ozempic was 2 months ago, pc complete. DBM    EXAMINATION UNDER ANESTHESIA N/A 2024    Procedure: EXAM UNDER ANESTHESIA EXCISION ANAL LESION;  Surgeon: Wild Nicholson MD;  Location: [facility] OR 2ND FLR;  Service: Colon and Rectal;  Laterality: N/A;    EXCISION OF RECTAL MASS  2024    Procedure: EXCISION, MASS, RECTUM;  Surgeon: Wild Nicholson MD;  Location: [facility] OR 2ND FLR;  Service: Colon and Rectal;;    HYSTERECTOMY      SKIN BIOPSY      lymphomatoid papulosis    TUBAL LIGATION Bilateral     VARICOSE VEIN SURGERY Right     laser and injection       Social History     Socioeconomic History    Marital status:    Tobacco Use    Smoking status: Former     Current packs/day: 0.00     Types: Cigarettes     Quit date: 2022     Years since quittin.2    Smokeless tobacco: Never   Substance and Sexual Activity    Alcohol use: Yes     Comment: socially     Drug use: No   Social History Narrative    ** Merged History Encounter **  "         Social Determinants of Health     Transportation Needs: Unknown (2/14/2024)    PRAPARE - Transportation     Lack of Transportation (Non-Medical): Patient declined   Physical Activity: Unknown (2/14/2024)    Exercise Vital Sign     Days of Exercise per Week: Patient declined    [facility] from [facility]       Family History   Problem Relation Name Age of Onset    Heart failure Father      Heart disease Father      Diabetes Mother      Coronary artery disease Father      Diabetes Father         Review of patient's allergies indicates:   Allergen Reactions    Lisinopril Other (See Comments)     Cough.    Erythromycin Other (See Comments)     Nausea and cold sweats    Morphine Itching    Adhesive Rash     Paper tape ok       No current facility-administered medications on file prior to encounter.     Current Outpatient Medications on File Prior to Encounter   Medication Sig Dispense Refill    ALPRAZolam (XANAX) 0.25 MG tablet Take 1 tablet (0.25 mg total) by mouth 2 (two) times daily as needed for Anxiety. 60 tablet 0    clobetasoL (TEMOVATE) 0.05 % cream Apply 1 application topically 2 (two) times daily.      clobetasol 0.05% (TEMOVATE) 0.05 % Oint AAA on the trunk and extremities BID x4-6 weeks then D/C. Restart PRN for flares 60 g 2    estradioL (ESTRACE) 1 MG tablet Take 1 mg by mouth.      FINACEA 15 % Foam APPLY DAILY TO SKIN TO AFFECTED AREA EVERY DAY      hydroCHLOROthiazide (HYDRODIURIL) 25 MG tablet Take 25 mg by mouth once daily.      venlafaxine (EFFEXOR-XR) 75 MG 24 hr capsule Take 1 capsule (75 mg total) by mouth once daily. 90 capsule 1    metFORMIN (GLUCOPHAGE) 500 MG tablet Take 1 tablet (500 mg total) by mouth 2 (two) times daily with meals. (Patient not taking: Reported on 2/6/2024) 60 tablet 2       Anticoagulation:  No    Physical Exam:  Estimated body mass index is 31.8 kg/m² as calculated from the following:    Height as of this encounter: 5' 6" " (1.676 m).    Weight as of this encounter: 89.4 kg (197 lb).    General: No acute distress, well developed. AAOx3  Head: Normocephalic, Atraumatic  Eyes: Extra-occular movements intact, No discharge  Neck: supple, symmetrical, trachea midline  Lungs: normal respiratory effort, no respiratory distress, no wheezes  CV: regular rate, 2+ pulses  Abdomen: soft, non-tender, non-distended, no organomegaly  MSK: no edema, no deformities, normal ROM  Skin: skin color, texture, turgor normal.  Neurologic: no focal deficits, sensation intact    Labs:    Lab Results   Component Value Date    WBC 7.87 04/26/2024    HGB 12.1 04/26/2024    HCT 38.6 04/26/2024    MCV 91 04/26/2024     04/26/2024           BMP  Lab Results   Component Value Date     04/26/2024    K 4.2 04/26/2024     04/26/2024    CO2 21 (L) 04/26/2024    BUN 20 04/26/2024    CREATININE 0.8 04/26/2024    CALCIUM 9.0 04/26/2024    ANIONGAP 9 04/26/2024    EGFRNORACEVR >60.0 04/26/2024         Assessment: Isaias Collado is a 54 y.o. female with left nephrolithiasis.    Plan:     1. To OR for left ESWL  2. Consents signed   3. I have explained the risk, benefits, and alternatives of the procedure in detail. The patient voices understanding and all questions have been answered. The patient agrees to proceed as planned.     Jose Noel MD

## 2024-08-01 ENCOUNTER — HOSPITAL ENCOUNTER (OUTPATIENT)
Dept: RADIOLOGY | Facility: HOSPITAL | Age: 54
Discharge: HOME | End: 2024-08-01
Attending: UROLOGY
Payer: COMMERCIAL

## 2024-08-01 ENCOUNTER — OFFICE VISIT (OUTPATIENT)
Dept: UROLOGY | Facility: CLINIC | Age: 54
End: 2024-08-01
Payer: COMMERCIAL

## 2024-08-01 VITALS
BODY MASS INDEX: 32.36 KG/M2 | HEIGHT: 66 IN | SYSTOLIC BLOOD PRESSURE: 131 MMHG | WEIGHT: 201.38 LBS | HEART RATE: 80 BPM | DIASTOLIC BLOOD PRESSURE: 82 MMHG

## 2024-08-01 DIAGNOSIS — N20.0 KIDNEY STONE: Primary | ICD-10-CM

## 2024-08-01 DIAGNOSIS — N32.81 OAB (OVERACTIVE BLADDER): ICD-10-CM

## 2024-08-01 DIAGNOSIS — N20.0 KIDNEY STONE: ICD-10-CM

## 2024-08-01 LAB — SPECIMEN SOURCE: NORMAL

## 2024-08-01 PROCEDURE — 99999 PR PBB SHADOW E&M-EST. PATIENT-LVL IV: CPT | Mod: PBBFAC,,, | Performed by: UROLOGY

## 2024-08-01 PROCEDURE — 74018 RADEX ABDOMEN 1 VIEW: CPT | Mod: TC,FY

## 2024-08-01 PROCEDURE — 74018 RADEX ABDOMEN 1 VIEW: CPT | Mod: 26,,, | Performed by: RADIOLOGY

## 2024-08-01 PROCEDURE — 82365 CALCULUS SPECTROSCOPY: CPT | Performed by: UROLOGY

## 2024-08-01 RX ORDER — SOLIFENACIN SUCCINATE 5 MG/1
5 TABLET, FILM COATED ORAL DAILY
Qty: 30 TABLET | Refills: 5 | Status: SHIPPED | OUTPATIENT
Start: 2024-08-01 | End: 2025-01-28

## 2024-08-01 RX ORDER — CONJUGATED ESTROGENS 0.62 MG/G
0.5 CREAM VAGINAL
Qty: 1 APPLICATOR | Refills: 3 | Status: SHIPPED | OUTPATIENT
Start: 2024-08-01 | End: 2025-08-01

## 2024-08-01 NOTE — PROGRESS NOTES
Subjective:       Patient ID: Isaias Collado is a 54 y.o. female.    Chief Complaint: Results (kub)     This is a 54 y.o.  female patient with h/o left ureteral stone, kidney stone, adrenal adenoma.    Flank pain and ED visit 4/26/24.  Labs ok, UA only blood.   CT with left 5mm UVJ stone with mild hydronephrosis, left 4mm non obstructing stone.  Right 1.3 cm adrenal adenoma.    Had a prior 24 urine in 2017, low volume but does not recall other results. Serum Ca normal.     Also complains of urinary frequency, urgency, occasional UUI. Has tried and failed ditropan.  Adrenal labs negative 5/2024 for adrenal adenoma.    CT 5/2024: passed left UVJ stone, left 4mm non obstructing stone, no hydronephrosis.  Phleboliths.      No pain.  Wishes to check KUB to see if ESWL option.  Left non obstructing stone , SSD ok.     Left ESWL 7/11/24.  KUB 8/1/24:  no stone seen.   Passed some fragments--stone analysis pending.    Discomfort and pressure to urethra with frequency/urgency.          Lab Results   Component Value Date    CREATININE 0.8 04/26/2024       ---  PMH/PSH/Medications/Allergies/Social history reviewed and as in chart.    Review of Systems    Objective:      Physical Exam  Vitals reviewed.   Constitutional:       General: She is not in acute distress.     Appearance: Normal appearance.   HENT:      Head: Normocephalic and atraumatic.      Right Ear: External ear normal.      Left Ear: External ear normal.      Nose: Nose normal.      Mouth/Throat:      Mouth: Mucous membranes are moist.   Eyes:      Extraocular Movements: Extraocular movements intact.   Cardiovascular:      Rate and Rhythm: Normal rate.   Pulmonary:      Effort: Pulmonary effort is normal. No respiratory distress.   Abdominal:      General: There is no distension.      Palpations: Abdomen is soft.   Musculoskeletal:         General: Normal range of motion.      Cervical back: Normal range of motion.   Skin:     General: Skin is warm and dry.    Neurological:      General: No focal deficit present.      Mental Status: She is alert.   Psychiatric:         Mood and Affect: Mood normal.         Behavior: Behavior normal.         Assessment:     Problem Noted   Oab (Overactive Bladder) 8/1/2024   Kidney Stone 5/9/2024    Left ESWL 7/11/24.       Adrenal Adenoma, Right 5/9/2024       Plan:     Good results from ESWL  Stone analysis  Follow up in 6 months with MIRYAM and KUB  Vesicare for OAB, vaginal estrogen       Trino Young MD    The above referenced imaging and interpretations were personally reviewed.  Disclaimer: This note has been generated using voice-recognition software. There may be typographical errors that have been missed during proof-reading.

## 2024-08-21 ENCOUNTER — OFFICE VISIT (OUTPATIENT)
Dept: PSYCHIATRY | Facility: CLINIC | Age: 54
End: 2024-08-21
Payer: COMMERCIAL

## 2024-08-21 DIAGNOSIS — F41.1 GENERALIZED ANXIETY DISORDER WITH PANIC ATTACKS: ICD-10-CM

## 2024-08-21 DIAGNOSIS — F41.0 GENERALIZED ANXIETY DISORDER WITH PANIC ATTACKS: ICD-10-CM

## 2024-08-21 DIAGNOSIS — F33.41 MDD (MAJOR DEPRESSIVE DISORDER), RECURRENT, IN PARTIAL REMISSION: Primary | ICD-10-CM

## 2024-08-21 PROCEDURE — 1160F RVW MEDS BY RX/DR IN RCRD: CPT | Mod: CPTII,95,, | Performed by: PHYSICIAN ASSISTANT

## 2024-08-21 PROCEDURE — 99214 OFFICE O/P EST MOD 30 MIN: CPT | Mod: 95,,, | Performed by: PHYSICIAN ASSISTANT

## 2024-08-21 PROCEDURE — 1159F MED LIST DOCD IN RCRD: CPT | Mod: CPTII,95,, | Performed by: PHYSICIAN ASSISTANT

## 2024-08-21 RX ORDER — VENLAFAXINE HYDROCHLORIDE 75 MG/1
75 CAPSULE, EXTENDED RELEASE ORAL DAILY
Qty: 90 CAPSULE | Refills: 1 | Status: SHIPPED | OUTPATIENT
Start: 2024-08-21 | End: 2025-02-17

## 2024-08-21 RX ORDER — ALPRAZOLAM 0.25 MG/1
0.25 TABLET ORAL 2 TIMES DAILY PRN
Qty: 60 TABLET | Refills: 0 | Status: SHIPPED | OUTPATIENT
Start: 2024-08-21 | End: 2024-09-20

## 2024-08-21 NOTE — PROGRESS NOTES
"Outpatient Psychiatry Follow-Up Visit (MD/LEANDRA)    8/21/2024    Clinical Status of Patient:  Outpatient (Ambulatory)    Chief Complaint:  Isaias Collado is a 54 y.o. female who presents today for follow-up of anxiety.  Met with patient.      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 Feeling good."    Patients mood is euthymic, affect appears mood congruent. Linear and logical, friendly and cooperative, good eye contact.    Denies SI/HI/AVH. Pt reports sleeping well and normal appetite. Denies side effects of medications.    Pt reports taking medications as prescribed and behaving appropriately during interview today.      Psychotherapy:  Target symptoms: depression, anxiety   Why chosen therapy is appropriate versus another modality: relevant to diagnosis, patient responds to this modality, evidence based practice  Outcome monitoring methods: self-report, observation  Therapeutic intervention type: insight oriented psychotherapy, supportive psychotherapy  Topics discussed/themes: building skills sets for symptom management, symptom recognition  The patient's response to the intervention is accepting. The patient's progress toward treatment goals is good.   Duration of intervention: 8 minutes.      Prior visit:    Pt reports today: "mood has been ok, concerned about amount of weight I am gaining. Pt currently at 200lb. Hx PCOS but no longer on metformin    Reports anxiety better controlled with increase in effexor at last visit.    Pt concerned may be related to medications. Discussed with pt need labwork done to eliminate other possibilities    Patients mood is steady, affect appears mood congruent. Linear and logical, friendly and cooperative, good eye contact.    Denies SI/HI/AVH. Pt reports sleeping well and normal appetite. Reports " constipation from effexor, recommending miralax daily until normalized bowel movemetns.    Pt reports taking medications as prescribed and behaving appropriately during interview today.    Review of Systems     Review of Systems   Constitutional:  Negative for fever.   HENT:  Negative for sore throat.    Eyes:  Negative for photophobia.   Respiratory:  Negative for cough.    Cardiovascular:  Negative for chest pain and palpitations.   Gastrointestinal:  Negative for abdominal pain, constipation, nausea and vomiting.   Genitourinary:  Negative for dysuria.   Musculoskeletal:  Negative for myalgias.   Skin:  Negative for rash.   Neurological:  Negative for dizziness.   Endo/Heme/Allergies:  Does not bruise/bleed easily.   Psychiatric/Behavioral:  Negative for depression, hallucinations, substance abuse and suicidal ideas. The patient is not nervous/anxious and does not have insomnia.        Psychiatric Review Of Systems - Is patient experiencing or having changes in:  sleep: no  appetite: no  weight: yes  energy/anergy: no  interest/pleasure/anhedonia: no  somatic symptoms: no  libido: no  anxiety/panic: improved  guilty/hopelessness: no  concentration: no  S.I.B.s/risky behavior: no  Irritability: no  Racing thoughts: no  Impulsive behaviors: no  Paranoia: no  AVH: no      Past Medical, Family and Social History: The patient's past medical, family and social history have been reviewed and updated as appropriate within the electronic medical record - see encounter notes.      Current Medications:   Medication List with Changes/Refills   Current Medications    CLOBETASOL (TEMOVATE) 0.05 % CREAM    Apply 1 application topically 2 (two) times daily.    CLOBETASOL 0.05% (TEMOVATE) 0.05 % OINT    AAA on the trunk and extremities BID x4-6 weeks then D/C. Restart PRN for flares    CONJUGATED ESTROGENS (PREMARIN) VAGINAL CREAM    Place 0.5 g vaginally twice a week.    ESTRADIOL (ESTRACE) 1 MG TABLET    Take 1 mg by mouth.     "FINACEA 15 % FOAM    APPLY DAILY TO SKIN TO AFFECTED AREA EVERY DAY    HYDROCHLOROTHIAZIDE (HYDRODIURIL) 25 MG TABLET    Take 25 mg by mouth once daily.    METFORMIN (GLUCOPHAGE) 500 MG TABLET    Take 1 tablet (500 mg total) by mouth 2 (two) times daily with meals.    SOLIFENACIN (VESICARE) 5 MG TABLET    Take 1 tablet (5 mg total) by mouth once daily.   Changed and/or Refilled Medications    Modified Medication Previous Medication    ALPRAZOLAM (XANAX) 0.25 MG TABLET ALPRAZolam (XANAX) 0.25 MG tablet       Take 1 tablet (0.25 mg total) by mouth 2 (two) times daily as needed for Anxiety.    Take 1 tablet (0.25 mg total) by mouth 2 (two) times daily as needed for Anxiety.    VENLAFAXINE (EFFEXOR-XR) 75 MG 24 HR CAPSULE venlafaxine (EFFEXOR-XR) 75 MG 24 hr capsule       Take 1 capsule (75 mg total) by mouth once daily.    Take 1 capsule (75 mg total) by mouth once daily.         Allergies:   Review of patient's allergies indicates:   Allergen Reactions    Lisinopril Other (See Comments)     Cough.    Erythromycin Other (See Comments)     Nausea and cold sweats    Morphine Itching    Adhesive Rash     Paper tape ok         Vitals   There were no vitals filed for this visit.           Labs/Imaging/Studies:   No results found for this or any previous visit (from the past 48 hour(s)).   No results found for: "PHENYTOIN", "PHENOBARB", "VALPROATE", "CBMZ"    Compliance: yes    Side effects: see above    Risk Parameters:  Patient reports no suicidal ideation  Patient reports no homicidal ideation  Patient reports no self-injurious behavior  Patient reports no violent behavior    Exam (detailed: at least 9 elements; comprehensive: all 15 elements)   Constitutional  Vitals:  Most recent vital signs, dated less than 90 days prior to this appointment, were reviewed.   There were no vitals filed for this visit.         General:  unremarkable, age appropriate     Musculoskeletal  Muscle Strength/Tone:  not examined   Gait & " Station:  non-ataxic     Psychiatric  Speech:  no latency; no press   Mood & Affect:  Steady and euthymic  congruent and appropriate   Thought Process:  normal and logical   Associations:  intact   Thought Content:  normal, no suicidality, no homicidality, delusions, or paranoia   Insight:  intact, has awareness of illness   Judgement: behavior is adequate to circumstances   Orientation:  grossly intact   Memory: intact for content of interview   Language: grossly intact   Attention Span & Concentration:  able to focus   Fund of Knowledge:  intact and appropriate to age and level of education     Assessment and Diagnosis   Status/Progress: Based on the examination today, the patient's problem(s) is/are improved and well controlled.  New problems have not been presented today.   Co-morbidities, Diagnostic uncertainty and Lack of compliance are not complicating management of the primary condition.  There are no active rule-out diagnoses for this patient at this time.     General Impression:      ICD-10-CM ICD-9-CM   1. MDD (major depressive disorder), recurrent, in partial remission  F33.41 296.35   2. Generalized anxiety disorder with panic attacks  F41.1 300.02    F41.0 300.01       Intervention/Counseling/Treatment Plan   Medication Management: Continue current medications. The risks and benefits of medication were discussed with the patient.    -continue effexor 75mg daily    -continue xanax 0.25mg bid prn panic attacks       Return to Clinic: 4-6 months    The risks and benefits of medication were discussed with the patient.  Discussed diagnosis, risks and benefits of proposed treatment above vs alternative treatments vs no treatment, and potential side effects of these treatments. The patient expresses understanding of the above and displays the capacity to agree with this treatment given said understanding. Patient also agrees that, currently, the benefits outweigh the risks and would like to pursue treatment  at this time.  Discussed inherent unpredictability of medications in each individual.   Encouraged Patient to keep future appointments.   Take medications as prescribed and abstain from substance abuse.   In the event of an emergency, patient was advised to go to the emergency room      Scott Silva PA-C      Total face to face time: 13 min  Total time (chart review, patient contact, documentation): 15 min      *This note has been prepared using a combination of a dictation device and typing.  It has been checked for errors but some errors may still exist within the note as a result of speech recognition errors and/or typographical errors.

## 2024-11-04 ENCOUNTER — HOSPITAL ENCOUNTER (EMERGENCY)
Facility: HOSPITAL | Age: 54
Discharge: HOME | End: 2024-11-04
Attending: EMERGENCY MEDICINE
Payer: COMMERCIAL

## 2024-11-04 VITALS
WEIGHT: 205 LBS | HEIGHT: 66 IN | SYSTOLIC BLOOD PRESSURE: 130 MMHG | OXYGEN SATURATION: 98 % | RESPIRATION RATE: 16 BRPM | TEMPERATURE: 98 F | DIASTOLIC BLOOD PRESSURE: 88 MMHG | BODY MASS INDEX: 32.95 KG/M2 | HEART RATE: 87 BPM

## 2024-11-04 DIAGNOSIS — K80.20 CALCULUS OF GALLBLADDER WITHOUT CHOLECYSTITIS WITHOUT OBSTRUCTION: Primary | ICD-10-CM

## 2024-11-04 DIAGNOSIS — E27.9 ADRENAL NODULE: ICD-10-CM

## 2024-11-04 DIAGNOSIS — R10.9 ABDOMINAL PAIN: ICD-10-CM

## 2024-11-04 LAB
ALBUMIN SERPL BCP-MCNC: 3.6 G/DL (ref 3.5–5.2)
ALP SERPL-CCNC: 86 U/L (ref 40–150)
ALT SERPL W/O P-5'-P-CCNC: 22 U/L (ref 10–44)
ANION GAP SERPL CALC-SCNC: 12 MMOL/L (ref 8–16)
AST SERPL-CCNC: 19 U/L (ref 10–40)
B-HCG UR QL: NEGATIVE
BASOPHILS # BLD AUTO: 0.04 K/UL (ref 0–0.2)
BASOPHILS NFR BLD: 0.6 % (ref 0–1.9)
BILIRUB SERPL-MCNC: 0.3 MG/DL (ref 0.1–1)
BILIRUB UR QL STRIP: NEGATIVE
BUN SERPL-MCNC: 13 MG/DL (ref 6–20)
BUN SERPL-MCNC: 13 MG/DL (ref 6–30)
CALCIUM SERPL-MCNC: 9.2 MG/DL (ref 8.7–10.5)
CHLORIDE SERPL-SCNC: 102 MMOL/L (ref 95–110)
CHLORIDE SERPL-SCNC: 105 MMOL/L (ref 95–110)
CLARITY UR REFRACT.AUTO: CLEAR
CO2 SERPL-SCNC: 22 MMOL/L (ref 23–29)
COLOR UR AUTO: YELLOW
CREAT SERPL-MCNC: 0.7 MG/DL (ref 0.5–1.4)
CREAT SERPL-MCNC: 0.8 MG/DL (ref 0.5–1.4)
CTP QC/QA: YES
DIFFERENTIAL METHOD BLD: ABNORMAL
EOSINOPHIL # BLD AUTO: 0.1 K/UL (ref 0–0.5)
EOSINOPHIL NFR BLD: 1.4 % (ref 0–8)
ERYTHROCYTE [DISTWIDTH] IN BLOOD BY AUTOMATED COUNT: 14.4 % (ref 11.5–14.5)
EST. GFR  (NO RACE VARIABLE): >60 ML/MIN/1.73 M^2
GLUCOSE SERPL-MCNC: 103 MG/DL (ref 70–110)
GLUCOSE SERPL-MCNC: 106 MG/DL (ref 70–110)
GLUCOSE UR QL STRIP: NEGATIVE
HCT VFR BLD AUTO: 40.6 % (ref 37–48.5)
HCT VFR BLD CALC: 40 %PCV (ref 36–54)
HCV AB SERPL QL IA: NORMAL
HGB BLD-MCNC: 12.9 G/DL (ref 12–16)
HGB UR QL STRIP: NEGATIVE
HIV 1+2 AB+HIV1 P24 AG SERPL QL IA: NORMAL
IMM GRANULOCYTES # BLD AUTO: 0.03 K/UL (ref 0–0.04)
IMM GRANULOCYTES NFR BLD AUTO: 0.4 % (ref 0–0.5)
KETONES UR QL STRIP: NEGATIVE
LEUKOCYTE ESTERASE UR QL STRIP: NEGATIVE
LIPASE SERPL-CCNC: 24 U/L (ref 4–60)
LYMPHOCYTES # BLD AUTO: 1.5 K/UL (ref 1–4.8)
LYMPHOCYTES NFR BLD: 20 % (ref 18–48)
MCH RBC QN AUTO: 28.6 PG (ref 27–31)
MCHC RBC AUTO-ENTMCNC: 31.8 G/DL (ref 32–36)
MCV RBC AUTO: 90 FL (ref 82–98)
MONOCYTES # BLD AUTO: 0.4 K/UL (ref 0.3–1)
MONOCYTES NFR BLD: 5.4 % (ref 4–15)
NEUTROPHILS # BLD AUTO: 5.2 K/UL (ref 1.8–7.7)
NEUTROPHILS NFR BLD: 72.2 % (ref 38–73)
NITRITE UR QL STRIP: NEGATIVE
NRBC BLD-RTO: 0 /100 WBC
OHS QRS DURATION: 82 MS
OHS QTC CALCULATION: 464 MS
PH UR STRIP: 7 [PH] (ref 5–8)
PLATELET # BLD AUTO: 237 K/UL (ref 150–450)
PMV BLD AUTO: 10.4 FL (ref 9.2–12.9)
POC IONIZED CALCIUM: 1.26 MMOL/L (ref 1.06–1.42)
POC TCO2 (MEASURED): 28 MMOL/L (ref 23–29)
POTASSIUM BLD-SCNC: 3.9 MMOL/L (ref 3.5–5.1)
POTASSIUM SERPL-SCNC: 4.1 MMOL/L (ref 3.5–5.1)
PROT SERPL-MCNC: 6.7 G/DL (ref 6–8.4)
PROT UR QL STRIP: NEGATIVE
RBC # BLD AUTO: 4.51 M/UL (ref 4–5.4)
SAMPLE: NORMAL
SODIUM BLD-SCNC: 140 MMOL/L (ref 136–145)
SODIUM SERPL-SCNC: 139 MMOL/L (ref 136–145)
SP GR UR STRIP: 1.02 (ref 1–1.03)
TROPONIN I SERPL DL<=0.01 NG/ML-MCNC: <0.006 NG/ML (ref 0–0.03)
URN SPEC COLLECT METH UR: NORMAL
WBC # BLD AUTO: 7.25 K/UL (ref 3.9–12.7)

## 2024-11-04 PROCEDURE — 80047 BASIC METABLC PNL IONIZED CA: CPT

## 2024-11-04 PROCEDURE — 85025 COMPLETE CBC W/AUTO DIFF WBC: CPT | Performed by: EMERGENCY MEDICINE

## 2024-11-04 PROCEDURE — 86803 HEPATITIS C AB TEST: CPT | Performed by: PHYSICIAN ASSISTANT

## 2024-11-04 PROCEDURE — 87389 HIV-1 AG W/HIV-1&-2 AB AG IA: CPT | Performed by: PHYSICIAN ASSISTANT

## 2024-11-04 PROCEDURE — 96374 THER/PROPH/DIAG INJ IV PUSH: CPT | Mod: 59

## 2024-11-04 PROCEDURE — 93005 ELECTROCARDIOGRAM TRACING: CPT

## 2024-11-04 PROCEDURE — 63600175 PHARM REV CODE 636 W HCPCS: Performed by: EMERGENCY MEDICINE

## 2024-11-04 PROCEDURE — 80053 COMPREHEN METABOLIC PANEL: CPT | Performed by: EMERGENCY MEDICINE

## 2024-11-04 PROCEDURE — 84484 ASSAY OF TROPONIN QUANT: CPT | Performed by: EMERGENCY MEDICINE

## 2024-11-04 PROCEDURE — 25500020 PHARM REV CODE 255: Performed by: EMERGENCY MEDICINE

## 2024-11-04 PROCEDURE — 83690 ASSAY OF LIPASE: CPT | Performed by: EMERGENCY MEDICINE

## 2024-11-04 PROCEDURE — 81003 URINALYSIS AUTO W/O SCOPE: CPT | Performed by: EMERGENCY MEDICINE

## 2024-11-04 PROCEDURE — 81025 URINE PREGNANCY TEST: CPT | Performed by: EMERGENCY MEDICINE

## 2024-11-04 PROCEDURE — 99285 EMERGENCY DEPT VISIT HI MDM: CPT | Mod: 25

## 2024-11-04 PROCEDURE — 93010 ELECTROCARDIOGRAM REPORT: CPT | Mod: ,,, | Performed by: INTERNAL MEDICINE

## 2024-11-04 PROCEDURE — 82308 ASSAY OF CALCITONIN: CPT

## 2024-11-04 RX ORDER — ACETAMINOPHEN 500 MG
500 TABLET ORAL EVERY 6 HOURS PRN
Qty: 20 TABLET | Refills: 0 | Status: SHIPPED | OUTPATIENT
Start: 2024-11-04

## 2024-11-04 RX ORDER — KETOROLAC TROMETHAMINE 30 MG/ML
10 INJECTION, SOLUTION INTRAMUSCULAR; INTRAVENOUS
Status: COMPLETED | OUTPATIENT
Start: 2024-11-04 | End: 2024-11-04

## 2024-11-04 RX ADMIN — IOHEXOL 100 ML: 350 INJECTION, SOLUTION INTRAVENOUS at 10:11

## 2024-11-04 RX ADMIN — KETOROLAC TROMETHAMINE 10 MG: 30 INJECTION, SOLUTION INTRAMUSCULAR; INTRAVENOUS at 06:11

## 2024-11-04 NOTE — ED PROVIDER NOTES
Encounter Date: 2024       History     Chief Complaint   Patient presents with    Abdominal Pain     Pt states pain to the right upper quadrant of her abdomen that she states might be from her gallstone. Pt states she has a known gallstone for years. Pt denies nausea, vomiting, diarrhea, pt states pain started early this morning     54-year-old female presenting with abdominal pain.    PMH: Hypertension, panic attacks, PCOS, basal cell carcinoma, kidney stones    The patient states the pain woke her from sleep around 2- 3 a.m..  She ate a sandwich and ice cream for dinner last night, was feeling normal prior to going to sleep.  She states the pain is located in her right upper abdomen and she has a history of a gallstone.  She took a Tums for her symptoms with minimal improvement.  She had a bowel movement this morning and denies seeing any blood.  Denies dysuria or hematuria.  She felt nauseated, but did not vomit.  She has had kidney stones previously and states that this feels different.      Status post hysterectomy, , tubal ligation    The history is provided by the patient and medical records. No  was used.     Review of patient's allergies indicates:   Allergen Reactions    Lisinopril Other (See Comments)     Cough.    Erythromycin Other (See Comments)     Nausea and cold sweats    Morphine Itching    Adhesive Rash     Paper tape ok     Past Medical History:   Diagnosis Date    Adenomyosis internal 2016    Allergy to ACE inhibitors 2016    Basal cell carcinoma     Dysfunctional uterine bleeding 2016    Hx of renal calculi     Hypertension     no problem since weight loss     Panic attacks     PCOS (polycystic ovarian syndrome) 2016    w/ insulin resistance    Rosacea     Squamous cell carcinoma of skin     Varicose vein of leg     laser and injection tx      Past Surgical History:   Procedure Laterality Date     SECTION      ;      COLONOSCOPY N/A 3/25/2024    Procedure: COLONOSCOPY;  Surgeon: Rasheed Garcia MD;  Location: [facility] ENDOSCOPY;  Service: Endoscopy;  Laterality: N/A;  Ref by Zaid Youngs- Ozempic-hold x 7 days prior procedure, PEG, portal - PC  3/18- lvm and portal msg for pc- pt returned call, last dose of Ozempic was 2 months ago, pc complete. DBM    EXAMINATION UNDER ANESTHESIA N/A 2024    Procedure: EXAM UNDER ANESTHESIA EXCISION ANAL LESION;  Surgeon: Wild Nicholson MD;  Location: [facility] OR [facility] FLR;  Service: Colon and Rectal;  Laterality: N/A;    EXCISION OF RECTAL MASS  2024    Procedure: EXCISION, MASS, RECTUM;  Surgeon: Wild Nicholson MD;  Location: [facility] OR 2ND FLR;  Service: Colon and Rectal;;    EXTRACORPOREAL SHOCK WAVE LITHOTRIPSY Left 2024    Procedure: LITHOTRIPSY, ESWL 60 minutes;  Surgeon: Trino Young MD;  Location: [facility] OR;  Service: Urology;  Laterality: Left;  LMA   Please call [employer] [phone] to confirm booking when scheduled. Confirmation # awaiting confirmation AM 24  Confirmed on 24, confirmation #[id number] ROBERTO    HYSTERECTOMY      SKIN BIOPSY      lymphomatoid papulosis    TUBAL LIGATION Bilateral     VARICOSE VEIN SURGERY Right     laser and injection     Family History   Problem Relation Name Age of Onset    Heart failure Father      Heart disease Father      Diabetes Mother      Coronary artery disease Father      Diabetes Father       Social History     Tobacco Use    Smoking status: Former     Current packs/day: 0.00     Types: Cigarettes     Quit date: 2022     Years since quittin.5    Smokeless tobacco: Never   Substance Use Topics    Alcohol use: Yes     Comment: socially     Drug use: No       Physical Exam     Initial Vitals [24 0557]   BP Pulse Resp Temp SpO2   (!) 184/103 91 18 98.6 °F (37 °C) 97 %      MAP       --         Physical Exam    Nursing note and vitals reviewed.  Constitutional: She is not diaphoretic. No  distress.   HENT:   Head: Normocephalic and atraumatic.   Eyes: Right eye exhibits no discharge. Left eye exhibits no discharge.   Neck: Neck supple. No tracheal deviation present.   Cardiovascular:  Normal rate and regular rhythm.           Pulmonary/Chest: Breath sounds normal. No respiratory distress.   Abdominal: Abdomen is soft. There is abdominal tenderness.   RUQ TTP  Non peritonitic   No right CVA tenderness.  No left CVA tenderness. There is no tenderness at McBurney's point.   Musculoskeletal:      Cervical back: Neck supple.     Neurological: She is alert and oriented to person, place, and time.   Skin: Skin is warm. No rash noted.   Psychiatric: She has a normal mood and affect. Her behavior is normal.         ED Course   Procedures  Labs Reviewed   CBC W/ AUTO DIFFERENTIAL - Abnormal       Result Value    WBC 7.25      RBC 4.51      Hemoglobin 12.9      Hematocrit 40.6      MCV 90      MCH 28.6      MCHC 31.8 (*)     RDW 14.4      Platelets 237      MPV 10.4      Immature Granulocytes 0.4      Gran # (ANC) 5.2      Immature Grans (Abs) 0.03      Lymph # 1.5      Mono # 0.4      Eos # 0.1      Baso # 0.04      nRBC 0      Gran % 72.2      Lymph % 20.0      Mono % 5.4      Eosinophil % 1.4      Basophil % 0.6      Differential Method Automated     COMPREHENSIVE METABOLIC PANEL - Abnormal    Sodium 139      Potassium 4.1      Chloride 105      CO2 22 (*)     Glucose 103      BUN 13      Creatinine 0.8      Calcium 9.2      Total Protein 6.7      Albumin 3.6      Total Bilirubin 0.3      Alkaline Phosphatase 86      AST 19      ALT 22      eGFR >60.0      Anion Gap 12     HIV 1 / 2 ANTIBODY    HIV 1/2 Ag/Ab Non-reactive      Narrative:     Release to patient->Immediate   HEPATITIS C ANTIBODY    Hepatitis C Ab Non-reactive      Narrative:     Release to patient->Immediate   URINALYSIS, REFLEX TO URINE CULTURE    Specimen UA Urine, Clean Catch      Color, UA Yellow      Appearance, UA Clear      pH, UA 7.0       Specific Gravity, UA 1.020      Protein, UA Negative      Glucose, UA Negative      Ketones, UA Negative      Bilirubin (UA) Negative      Occult Blood UA Negative      Nitrite, UA Negative      Leukocytes, UA Negative      Narrative:     Specimen Source->Urine   TROPONIN I    Troponin I <0.006     LIPASE    Lipase 24     POCT URINE PREGNANCY    POC Preg Test, Ur Negative       Acceptable Yes     ISTAT PROCEDURE    POC Glucose 106      POC BUN 13      POC Creatinine 0.7      POC Sodium 140      POC Potassium 3.9      POC Chloride 102      POC TCO2 (MEASURED) 28      POC Ionized Calcium 1.26      POC Hematocrit 40      Sample DARLINE       EKG Readings: (Independently Interpreted)   Initial Reading: No STEMI. Previous EKG: Compared with most recent EKG Previous EKG Date: 2/14/2024. Heart Rate: 87. Clinical Impression: Normal Sinus Rhythm   Nonspecific T-wave abnormality     ECG Results              EKG 12-lead (Final result)        Collection Time Result Time QRS Duration OHS QTC Calculation    11/04/24 06:36:01 11/04/24 09:39:31 82 464                     Final result by Interface, Lab In [facility] (11/04/24 09:39:41)                   Narrative:    Test Reason : R10.9,    Vent. Rate : 087 BPM     Atrial Rate : 087 BPM     P-R Int : 128 ms          QRS Dur : 082 ms      QT Int : 386 ms       P-R-T Axes : 054 034 045 degrees     QTc Int : 464 ms    Normal sinus rhythm  Low voltage QRS  Nonspecific T wave abnormality  Abnormal ECG  When compared with ECG of 14-FEB-2024 15:58,  Nonspecific T wave abnormality now evident in Anterior-lateral leads  Confirmed by Mateo CARDENAS MD ([id number]) on 11/4/2024 9:39:29 AM    Referred By: [email or web address]   SELF           Confirmed By:Mateo CARDENAS MD                                  Imaging Results              CT Abdomen Pelvis With IV Contrast NO Oral Contrast (Final result)  Result time 11/04/24 10:27:10      Final result by Mushtaq Worley MD (11/04/24 10:27:10)                    Impression:      No acute process in the abdomen or pelvis.    Cholelithiasis.    Right adrenal nodule, unchanged compared to prior exam.    Paddock steatosis.      Electronically signed by: Mushtaq Worley MD  Date:    11/04/2024  Time:    10:27               Narrative:    EXAMINATION:  CT ABDOMEN PELVIS WITH IV CONTRAST    CLINICAL HISTORY:  Epigastric pain;    TECHNIQUE:  Axial images of the abdomen and pelvis were acquired  after the use of 100 cc [id number] IV contrast.  Coronal and sagittal reconstructions were also obtained    COMPARISON:  Ultrasound from 11/04/2024, CT renal stone study 05/16/2024    FINDINGS:  Heart: Normal in size. No pericardial effusion.    Lungs: Visualized portions of the lungs are clear.    Liver: Normal in size and contour.  Diffuse hypoattenuation liver consistent hepatic steatosis.  No focal hepatic lesion.    Gallbladder: Large calcified gallstone within the gallbladder.  Trace pericholecystic fluid.    Bile Ducts: No evidence of dilated ducts.    Pancreas: No mass or peripancreatic fat stranding.    Spleen: Unremarkable.    Stomach and duodenum: Unremarkable.    Adrenals: 1.0 cm right adrenal nodule previously identified as adenoma.  This is unchanged compared to prior exam.    Kidneys/ Ureters: Normal in size and location. Normal enhancement. Few hypodensities within kidneys consistent cysts or too small to characterize.  No hydronephrosis or nephrolithiasis. No ureteral dilatation.    Bladder: No evidence of wall thickening.    Reproductive organs: Uterus is absent.  No adnexal mass.    Bowel/Mesentery: Small bowel is normal in caliber with no evidence of obstruction. No evidence of inflammation or wall thickening.  Normal appendix.  Colon demonstrates no focal wall thickening.    Lymph nodes: No lymphadenapathy.    Vasculature: No aneurysm. No significant calcific atherosclerosis.    Abdominal wall:  Unremarkable.    Bones: No acute fracture. No suspicious osseous  lesions.                                       US Abdomen Limited (Final result)  Result time 11/04/24 09:08:19      Final result by Tano Hernández MD (11/04/24 09:08:19)                   Impression:      Gallbladder sludge, cholelithiasis, and adenomyomatosis.  The gallbladder is distended, however there are no other signs of cholecystitis.    Hepatic steatosis with focal fat sparing near the gallbladder fossa.      Electronically signed by: Tano Hernández  Date:    11/04/2024  Time:    09:08               Narrative:    EXAMINATION:  US ABDOMEN LIMITED    CLINICAL HISTORY:  RUQ, assess for cholecystitis, cholelithiasis;    TECHNIQUE:  Limited ultrasound of the right upper quadrant of the abdomen focused on the biliary system was performed.    COMPARISON:  CT renal stone 05/16/2024    FINDINGS:  Pancreas: Obscured by bowel gas.    Gallbladder: Gallbladder sludge.  Single nonmobile gallstone measuring 4.6 cm.  The gallbladder is distended, measuring approximately 12 cm in length.  No wall thickening.  There are multiple foci of comet tail artifact arising from the gallbladder wall indicating adenomyomatosis.  Negative sonographic Mitchell sign.    Biliary system: The common duct measures 5 mm. No intrahepatic ductal dilatation.    Miscellaneous: Echogenic liver parenchyma.  4.5 cm hypoechoic lesion in the right hepatic lobe near the gallbladder fossa.  Mild pelviectasis in the right kidney.  No ascites.                                       Medications   ketorolac injection 9.999 mg (9.999 mg Intravenous Given 11/4/24 0630)   iohexoL (OMNIPAQUE 350) injection 100 mL (100 mLs Intravenous Given 11/4/24 1014)     Medical Decision Making  54-year-old female presenting with right upper quadrant pain and nausea that woke her from sleep.  Plan for lab evaluation, pain control, right upper quadrant ultrasound, CT to r/o obstruction.    Differential including, but not limited to:  Cholelithiasis, cholecystitis, pancreatitis,  anginal equivalent, nephrolithiasis, bowel obstruction    Labs reviewed - no leukocytosis, no anemia, normal LFTs, normal lipase  Doubt ACS, CTNI neg.     RUQ US findings discussed with patient:  Gallbladder sludge, cholelithiasis, and adenomyomatosis.  The gallbladder is distended, however there are no other signs of cholecystitis.  CT with stable right adrenal nodule, paddock steatosis (needs PCP f/u for maintenance/additional imaging).    On reassessment, pain has resolved after toradol.  Successful PO challenge w/o return of pain.   We discussed strict return precautions -- suspect her presentation was sx cholelithiasis.  If pain returns, fever, vomiting, any new or concerning sx, patient should return to the ED. Otherwise, I have placed a referral to General Surgery for outpatient f/u and consideration of cholecystectomy.  We discussed emergent indications for this procedure, given her pain is controlled, plan for discharge.     Patient safe for discharge and outpatient follow up.  Advised scheduling appointment with PCP and return precautions given.  The patient understands and agrees with the plan.  All questions answered prior to discharge.             Amount and/or Complexity of Data Reviewed  Labs: ordered. Decision-making details documented in ED Course.  Radiology: ordered.  ECG/medicine tests: ordered and independent interpretation performed.    Risk  OTC drugs.  Prescription drug management.               ED Course as of 11/04/24 1948 Mon Nov 04, 2024   0745 hCG Qualitative, Urine: Negative [AB]   0806 Lipase: 24  Normal  [AB]   0807 CMP hemolyzed, ordered for repeat  [AB]   0807 WBC: 7.25  No leukocytosis  [AB]   0807 Hemoglobin: 12.9  No anemia  [AB]   0807 Troponin I: <0.006  Normal  [AB]      ED Course User Index  [AB] Garrett Lloyd MD                           Clinical Impression:  Final diagnoses:  [R10.9] Abdominal pain  [K80.20] Calculus of gallbladder without cholecystitis without  obstruction (Primary)          ED Disposition Condition    Discharge Stable          ED Prescriptions       Medication Sig Dispense Start Date End Date Auth. Provider    acetaminophen (TYLENOL) 500 MG tablet Take 1 tablet (500 mg total) by mouth every 6 (six) hours as needed for Pain. 20 tablet 11/4/2024 -- Garrett Lloyd MD          Follow-up Information       Follow up With Specialties Details Why Contact Info    Bhavik Ryan MD Internal Medicine In 3 days  [address]  [place] [address]  [phone]      [facility] - Emergency Dept Emergency Medicine  As needed, If symptoms worsen [address] [facility] [id number] [phone]             Garrett Lloyd MD  11/04/24 1953

## 2024-11-04 NOTE — Clinical Note
"Isaias "Isaias" Heaven was seen and treated in our emergency department on 11/4/2024.  She may return to work on 11/05/2024.       If you have any questions or concerns, please don't hesitate to call.      Garrett Lloyd MD"

## 2024-11-04 NOTE — ED NOTES
"Isaias Collado, a 54 y.o. female presents to the ED w/ complaint of abdominal pain. Pt reports right upper abdominal pain that started around 3am. Pt stated "I believe it's my gall bladder." Pt endorses 7/10 pain at this time. Pt denies n/v/d.     Triage note:  Chief Complaint   Patient presents with    Abdominal Pain     Pt states pain to the right upper quadrant of her abdomen that she states might be from her gallstone. Pt states she has a known gallstone for years. Pt denies nausea, vomiting, diarrhea, pt states pain started early this morning     Review of patient's allergies indicates:   Allergen Reactions    Lisinopril Other (See Comments)     Cough.    Erythromycin Other (See Comments)     Nausea and cold sweats    Morphine Itching    Adhesive Rash     Paper tape ok     Past Medical History:   Diagnosis Date    Adenomyosis internal 08/2016    Allergy to ACE inhibitors 04/28/2016    Basal cell carcinoma     Dysfunctional uterine bleeding 08/2016    Hx of renal calculi     Hypertension     no problem since weight loss     Panic attacks     PCOS (polycystic ovarian syndrome) 08/2016    w/ insulin resistance    Rosacea     Squamous cell carcinoma of skin     Varicose vein of leg     laser and injection tx 2016       "

## 2024-11-04 NOTE — ED NOTES
I-STAT Chem-8+ Results:   Value Reference Range   Sodium 140 136-145 mmol/L   Potassium  3.9 3.5-5.1 mmol/L   Chloride 102  mmol/L   Ionized Calcium 1.26 1.06-1.42 mmol/L   CO2 (measured) 28 23-29 mmol/L   Glucose 106  mg/dL   BUN 13 6-30 mg/dL   Creatinine 0.7 0.5-1.4 mg/dL   Hematocrit 40 36-54%

## 2024-11-19 ENCOUNTER — OFFICE VISIT (OUTPATIENT)
Dept: PSYCHIATRY | Facility: CLINIC | Age: 54
End: 2024-11-19
Payer: COMMERCIAL

## 2024-11-19 DIAGNOSIS — F41.1 GENERALIZED ANXIETY DISORDER WITH PANIC ATTACKS: ICD-10-CM

## 2024-11-19 DIAGNOSIS — F90.0 ATTENTION DEFICIT HYPERACTIVITY DISORDER (ADHD), PREDOMINANTLY INATTENTIVE TYPE: ICD-10-CM

## 2024-11-19 DIAGNOSIS — F33.41 MDD (MAJOR DEPRESSIVE DISORDER), RECURRENT, IN PARTIAL REMISSION: Primary | ICD-10-CM

## 2024-11-19 DIAGNOSIS — F41.0 GENERALIZED ANXIETY DISORDER WITH PANIC ATTACKS: ICD-10-CM

## 2024-11-19 PROCEDURE — 1159F MED LIST DOCD IN RCRD: CPT | Mod: CPTII,S$GLB,, | Performed by: SOCIAL WORKER

## 2024-11-19 PROCEDURE — 90785 PSYTX COMPLEX INTERACTIVE: CPT | Mod: S$GLB,,, | Performed by: SOCIAL WORKER

## 2024-11-19 PROCEDURE — 90837 PSYTX W PT 60 MINUTES: CPT | Mod: S$GLB,,, | Performed by: SOCIAL WORKER

## 2024-11-19 PROCEDURE — 99999 PR PBB SHADOW E&M-EST. PATIENT-LVL II: CPT | Mod: PBBFAC,,, | Performed by: SOCIAL WORKER

## 2024-11-19 NOTE — PROGRESS NOTES
Individual Psychotherapy (LCSW/PhD)  Isaias Collado,  11/19/2024    Site:  [facility]         Therapeutic Intervention: Met with patient for individual psychotherapy.    Chief complaint/reason for encounter: depression and anxiety     Interval history and content of current session: Pt her son has a friends now. Pt reported he had a party the other night for the fight. Pt reported her son has been branching out. She reported she is waiting on her transmission for her car to come in. Pt reported her mom came in for a week recently. Pt reported her mom cannot do what she use to do. PT reported she still has her booths. Pt requested adult ADHD testing. She reported having a hard time staying on topic. PT reported if something does not interest her, she has a hard time focusing and absorbing information. Pt reported she has a difficult time staying on task. PT reported she has left food on the stove and the fire alarm goes off. PT reported it affects her everyday life, especially at home and work. Pt reported there is a lot of procrastination. Therapist sent a referral to psych testing for ADHD. We also discussed her weight. She reported she started [employer]. Therapist completed her referral to psych testing. Note sent to Teodora Shelton for scheduling. She denies SI, no HI or AVH.     Treatment plan:  Target symptoms: depression, anxiety   Why chosen therapy is appropriate versus another modality: relevant to diagnosis, patient responds to this modality, evidence based practice  Outcome monitoring methods: self-report, observation, checklist/rating scale  Therapeutic intervention type: insight oriented psychotherapy, behavior modifying psychotherapy, supportive psychotherapy    Risk parameters:  Patient reports no suicidal ideation  Patient reports no homicidal ideation  Patient reports no self-injurious behavior  Patient reports no violent behavior    Verbal deficits: None    Patient's response to  intervention:  The patient's response to intervention is accepting, motivated.    Progress toward goals and other mental status changes:  The patient's progress toward goals is fair .    Diagnosis:     ICD-10-CM ICD-9-CM   1. MDD (major depressive disorder), recurrent, in partial remission  F33.41 296.35         Plan: Pt plans to continue individual psychotherapy    Return to clinic: as scheduled    Length of Service (minutes): 60

## 2024-11-19 NOTE — Clinical Note
See note, pt requesting Adult ADHD testing, ref8 sent. The symptoms expressed in session are bolded.   Thank you, CANDACE Childs LCSW

## 2025-01-16 DIAGNOSIS — Z12.31 OTHER SCREENING MAMMOGRAM: ICD-10-CM

## 2025-01-27 ENCOUNTER — LAB VISIT (OUTPATIENT)
Dept: LAB | Facility: HOSPITAL | Age: 55
End: 2025-01-27
Attending: INTERNAL MEDICINE
Payer: COMMERCIAL

## 2025-01-27 ENCOUNTER — OFFICE VISIT (OUTPATIENT)
Dept: ENDOCRINOLOGY | Facility: CLINIC | Age: 55
End: 2025-01-27
Payer: COMMERCIAL

## 2025-01-27 VITALS
HEART RATE: 108 BPM | HEIGHT: 66 IN | BODY MASS INDEX: 34.47 KG/M2 | SYSTOLIC BLOOD PRESSURE: 134 MMHG | WEIGHT: 214.5 LBS | OXYGEN SATURATION: 99 % | DIASTOLIC BLOOD PRESSURE: 80 MMHG

## 2025-01-27 DIAGNOSIS — D35.00 ADRENAL ADENOMA, UNSPECIFIED LATERALITY: ICD-10-CM

## 2025-01-27 DIAGNOSIS — F41.9 ANXIETY: ICD-10-CM

## 2025-01-27 DIAGNOSIS — D35.00 ADRENAL ADENOMA, UNSPECIFIED LATERALITY: Primary | ICD-10-CM

## 2025-01-27 LAB
ERYTHROCYTE [DISTWIDTH] IN BLOOD BY AUTOMATED COUNT: 14 % (ref 11.5–14.5)
ESTIMATED AVG GLUCOSE: 128 MG/DL (ref 68–131)
HBA1C MFR BLD: 6.1 % (ref 4–5.6)
HCT VFR BLD AUTO: 41.6 % (ref 37–48.5)
HGB BLD-MCNC: 12.9 G/DL (ref 12–16)
MCH RBC QN AUTO: 28 PG (ref 27–31)
MCHC RBC AUTO-ENTMCNC: 31 G/DL (ref 32–36)
MCV RBC AUTO: 90 FL (ref 82–98)
PLATELET # BLD AUTO: 215 K/UL (ref 150–450)
PMV BLD AUTO: 10.7 FL (ref 9.2–12.9)
RBC # BLD AUTO: 4.61 M/UL (ref 4–5.4)
TSH SERPL DL<=0.005 MIU/L-ACNC: 1.86 UIU/ML (ref 0.4–4)
WBC # BLD AUTO: 7.35 K/UL (ref 3.9–12.7)

## 2025-01-27 PROCEDURE — 83835 ASSAY OF METANEPHRINES: CPT | Performed by: INTERNAL MEDICINE

## 2025-01-27 PROCEDURE — 84443 ASSAY THYROID STIM HORMONE: CPT | Performed by: INTERNAL MEDICINE

## 2025-01-27 PROCEDURE — 3079F DIAST BP 80-89 MM HG: CPT | Mod: CPTII,S$GLB,, | Performed by: INTERNAL MEDICINE

## 2025-01-27 PROCEDURE — 99204 OFFICE O/P NEW MOD 45 MIN: CPT | Mod: S$GLB,,, | Performed by: INTERNAL MEDICINE

## 2025-01-27 PROCEDURE — 1160F RVW MEDS BY RX/DR IN RCRD: CPT | Mod: CPTII,S$GLB,, | Performed by: INTERNAL MEDICINE

## 2025-01-27 PROCEDURE — 99999 PR PBB SHADOW E&M-EST. PATIENT-LVL IV: CPT | Mod: PBBFAC,,, | Performed by: INTERNAL MEDICINE

## 2025-01-27 PROCEDURE — 1159F MED LIST DOCD IN RCRD: CPT | Mod: CPTII,S$GLB,, | Performed by: INTERNAL MEDICINE

## 2025-01-27 PROCEDURE — 85027 COMPLETE CBC AUTOMATED: CPT | Performed by: INTERNAL MEDICINE

## 2025-01-27 PROCEDURE — 3008F BODY MASS INDEX DOCD: CPT | Mod: CPTII,S$GLB,, | Performed by: INTERNAL MEDICINE

## 2025-01-27 PROCEDURE — 83036 HEMOGLOBIN GLYCOSYLATED A1C: CPT | Performed by: INTERNAL MEDICINE

## 2025-01-27 PROCEDURE — 36415 COLL VENOUS BLD VENIPUNCTURE: CPT | Mod: PO | Performed by: INTERNAL MEDICINE

## 2025-01-27 PROCEDURE — 3075F SYST BP GE 130 - 139MM HG: CPT | Mod: CPTII,S$GLB,, | Performed by: INTERNAL MEDICINE

## 2025-01-27 RX ORDER — DEXAMETHASONE 1 MG/1
TABLET ORAL
Qty: 1 TABLET | Refills: 0 | Status: SHIPPED | OUTPATIENT
Start: 2025-01-27

## 2025-01-27 NOTE — PROGRESS NOTES
CHIEF COMPLAINT: Adrenal Nodule  54 y.o. old being seen as a new patient. States concerned about weight gain. States mostly around the abdomen. Has some fatigue. She does have some chronic anxiety. She was taking steroid creams in the past for rectal polyp last year. When she was in her 20's she received steroid Tx for a skin disorder. Has a history of panic attacks. She does have easy bruising. States she is active but no distinct exercise. Has not done a calorie count. Recently got weight watchers phuong, but has not started. Lost weight in 2016 with a routine. Weight gain started after starting psychiatric medication.       PAST MEDICAL HISTORY/PAST SURGICAL HISTORY:  Reviewed in [facility]    SOCIAL HISTORY: Reviewed in [facility]    FAMILY HISTORY:  No known adrenal or pituitary disorders. No known thyroid conditions. + DM2- Father and mother.     MEDICATIONS/ALLERGIES: The patient's MedCard has been updated and reviewed.            PE:    GENERAL: Well developed, well nourished.  NECK: Supple, trachea midline, no palpable thyroid nodules  CHEST: Resp even and unlabored, CTA bilateral.  CARDIAC: RRR, S1, S2 heard, no murmurs, rubs, S3, or S4  SKIN:  Has some mild adipose deposition on the posterior neck.    LABS/Radiology     Latest Reference Range & Units 03/19/24 07:35   Cortisol, 8 AM 4.30 - 22.40 ug/dL 14.00      Latest Reference Range & Units 05/09/24 09:16   ALDOSTERONE ng/dL 22.7   Renin Activity ng/mL/h 0.9       CT ABDOMEN PELVIS WITH IV CONTRAST     CLINICAL HISTORY:  Epigastric pain;     TECHNIQUE:  Axial images of the abdomen and pelvis were acquired  after the use of 100 cc [id number] IV contrast.  Coronal and sagittal reconstructions were also obtained     COMPARISON:  Ultrasound from 11/04/2024, CT renal stone study 05/16/2024     FINDINGS:  Heart: Normal in size. No pericardial effusion.     Lungs: Visualized portions of the lungs are clear.     Liver: Normal in size and contour.  Diffuse hypoattenuation liver  consistent hepatic steatosis.  No focal hepatic lesion.     Gallbladder: Large calcified gallstone within the gallbladder.  Trace pericholecystic fluid.     Bile Ducts: No evidence of dilated ducts.     Pancreas: No mass or peripancreatic fat stranding.     Spleen: Unremarkable.     Stomach and duodenum: Unremarkable.     Adrenals: 1.0 cm right adrenal nodule previously identified as adenoma.  This is unchanged compared to prior exam.     Kidneys/ Ureters: Normal in size and location. Normal enhancement. Few hypodensities within kidneys consistent cysts or too small to characterize.  No hydronephrosis or nephrolithiasis. No ureteral dilatation.     Bladder: No evidence of wall thickening.     Reproductive organs: Uterus is absent.  No adnexal mass.     Bowel/Mesentery: Small bowel is normal in caliber with no evidence of obstruction. No evidence of inflammation or wall thickening.  Normal appendix.  Colon demonstrates no focal wall thickening.     Lymph nodes: No lymphadenapathy.     Vasculature: No aneurysm. No significant calcific atherosclerosis.     Abdominal wall:  Unremarkable.     Bones: No acute fracture. No suspicious osseous lesions.     Impression:     No acute process in the abdomen or pelvis.     Cholelithiasis.     Right adrenal nodule, unchanged compared to prior exam.     Paddock steatosis.    ASSESSMENT/PLAN:  1. Adrenal adenoma-no significant growth.  Discussed that adrenal nodules are very common.  No concerning characteristics.  She does have some anxiety, so will check a metanephrine.  However, imaging not suggestive of a pheochromocytoma.  We can also check a dex suppression test.  Discussed if dex suppression is positive, may need further testing.    2. Anxiety- see #1.     3. BMI 34-discussed diet as well as exercise.  Appears some of the weight gain started after starting an SSRI.  She is going to be starting weight watchers.  Discussed exercise.  Check TSH in rule out  Cushing's.      FOLLOWUP  Needs TSH, A1c, CBC, metanephrines  8 AM Cortisol, Dexamethasone (1 mg Dex at 11 PM the night prior to 8 AM labs)

## 2025-02-02 LAB
METANEPH FREE SERPL-MCNC: <25 PG/ML
METANEPHS SERPL-MCNC: 102 PG/ML
NORMETANEPH FREE SERPL-MCNC: 102 PG/ML

## 2025-02-03 ENCOUNTER — LAB VISIT (OUTPATIENT)
Dept: LAB | Facility: HOSPITAL | Age: 55
End: 2025-02-03
Attending: INTERNAL MEDICINE
Payer: COMMERCIAL

## 2025-02-03 DIAGNOSIS — D35.00 ADRENAL ADENOMA, UNSPECIFIED LATERALITY: ICD-10-CM

## 2025-02-03 DIAGNOSIS — F41.9 ANXIETY: ICD-10-CM

## 2025-02-03 LAB — CORTIS SERPL-MCNC: 1.1 UG/DL

## 2025-02-03 PROCEDURE — 82533 TOTAL CORTISOL: CPT | Performed by: INTERNAL MEDICINE

## 2025-02-03 PROCEDURE — 82542 COL CHROMOTOGRAPHY QUAL/QUAN: CPT | Performed by: INTERNAL MEDICINE

## 2025-02-06 ENCOUNTER — OFFICE VISIT (OUTPATIENT)
Dept: PSYCHIATRY | Facility: CLINIC | Age: 55
End: 2025-02-06
Payer: COMMERCIAL

## 2025-02-06 DIAGNOSIS — F41.1 GENERALIZED ANXIETY DISORDER WITH PANIC ATTACKS: ICD-10-CM

## 2025-02-06 DIAGNOSIS — Z13.39 ADHD (ATTENTION DEFICIT HYPERACTIVITY DISORDER) EVALUATION: Primary | ICD-10-CM

## 2025-02-06 DIAGNOSIS — F41.0 GENERALIZED ANXIETY DISORDER WITH PANIC ATTACKS: ICD-10-CM

## 2025-02-06 DIAGNOSIS — F40.10 SOCIAL ANXIETY DISORDER: ICD-10-CM

## 2025-02-06 PROCEDURE — 3044F HG A1C LEVEL LT 7.0%: CPT | Mod: CPTII,95,, | Performed by: PSYCHOLOGIST

## 2025-02-06 PROCEDURE — 90791 PSYCH DIAGNOSTIC EVALUATION: CPT | Mod: 95,,, | Performed by: PSYCHOLOGIST

## 2025-02-06 PROCEDURE — 96131 PSYCL TST EVAL PHYS/QHP EA: CPT | Mod: 95,,, | Performed by: PSYCHOLOGIST

## 2025-02-06 PROCEDURE — 96130 PSYCL TST EVAL PHYS/QHP 1ST: CPT | Mod: 95,,, | Performed by: PSYCHOLOGIST

## 2025-02-06 PROCEDURE — 96138 PSYCL/NRPSYC TECH 1ST: CPT | Mod: NDTC,,, | Performed by: PSYCHOLOGIST

## 2025-02-06 PROCEDURE — 96139 PSYCL/NRPSYC TST TECH EA: CPT | Mod: NDTC,,, | Performed by: PSYCHOLOGIST

## 2025-02-06 NOTE — LETTER
February 7, 2025      Dept. Of Psychiatry         [facility] [address]  [address]  [facility] [address]  Phone: [phone]   Patient: Isaias Collado   MR Number: [id number]   YOB: 1970   Date of Visit: 2/6/2025       Dear Corey Childs LCSW, & DREW Dunbar:    Thank you for referring Isaias Collado to me for evaluation. Below are the relevant portions of my assessment and plan of care.    Please see the report for additional details. Based on this evaluation, Ms. Collado will not be assigned a diagnosis of ADHD. A diagnosis of Generalized Anxiety Disorder and Social Anxiety Disorder will be assigned. Ms. Collado was provided with the following recommendations:  Continue in pharmacotherapy with DREW Dunbar  Her current psychotropic medication regimen has been very successful for management of anxiety.  Continue in psychotherapy with Corey Childs LCSW  Her engagement in psychotherapy has been very helpful in reducing anxiety and avoidance. She was encouraged to work with her therapist on cognitive strategies to address negative self-statements and other cognitive distortions that likely impact her attention and performance.  In the future, Ms. Collado would like to return to college. She was reminded that prior educational models have changed and there are more resources to assist with learning. She was encouraged to work on negative thinking first, and to then make plans to cope ahead and build a sense of mastery with learning and school.  Consider Sleep 101  Although she reports sufficient quantity of sleep, there may be issues with the quality of her sleep given that she awakens tired and feels lethargic during the day. She was encouraged to consider Sleep 101 in case any strategies may help improve her sleep quality.  If needed, she may want to consider whether snoring and sleep apnea are an issue. She may be screened with the [place] Sleepiness Scale. If significant, a  referral order could be placed to Sleep Medicine.      Consider Train the Brain  A psychoeducational course is currently in development with a start date in March 2025. This course will help patients learn about strategies to promote brain health and compensatory mechanisms to improve attention, memory, and other areas of executive functioning.       If you have questions, please do not hesitate to call me.    Sincerely,    Alis Toribio, PhD  Clinical Psychologist

## 2025-02-06 NOTE — LETTER
"[address]  [facility] [address]  Phone: [phone]    February 7, 2025    Isaias Collado  [address] [facility] [facility] [address]        Dear Ms. Collado:    It was a pleasure to meet you for your evaluation. I have sent the full report to your therapist (Corey Childs LCSW) and psychiatric provider (DREW Braswell). Here is a summary of your results.    Based on this evaluation, I am not assigning a diagnosis of ADHD. A diagnosis of Generalized Anxiety Disorder and Social Anxiety Disorder will be assigned. There was insufficient evidence from objective tests and subjective measures to support a diagnosis of ADHD. There was mild evidence of difficulties with immediate attention capacity, but because it was only on the first trial it may have been due to anxiety. There were no significant indications of problems with inattentiveness, impulsivity, sustained attention, or vigilance on a continuous performance test. Your subjective self-reports revealed non-significant childhood symptoms related to ADHD, and your mother's reports regarding current symptoms were non-significant. You did endorse symptoms related to inattention and problems with self-concept, but it is likely these problems are related to anxiety more than ADHD.    I believe your symptoms are related to your longstanding history of generalized and social anxiety. You have done an excellent job managing your symptoms and approaching feared situations! I think you would also do an excellent job working on cognitive strategies to explore the impact this anxiety has had on your thinking patterns, which can weigh heavy on an individual and affect attention and performance. (For example, "I am mediocre.") It will also help you to identify and reduce the sense of pressure you may have in various situations (related to performance, responsibility, etc.). I trust you and your therapist to be able to work on this in treatment!    I also encourage you to " "consider psychoeducational courses and will include the following recommendations from my report:  "Consider Sleep 101  Although she reports sufficient quantity of sleep, there may be issues with the quality of her sleep given that she awakens tired and feels lethargic during the day. She was encouraged to consider Sleep 101 in case any strategies may help improve her sleep quality.  If needed, she may want to consider whether snoring and sleep apnea are an issue. She may be screened with the [place] Sleepiness Scale. If significant, a referral order could be placed to Sleep Medicine.  Consider Train the Brain  A psychoeducational course is currently in development with a start date in March 2025. This course will help patients learn about strategies to promote brain health and compensatory mechanisms to improve attention, memory, and other areas of executive functioning."    If you have any questions or concerns, please send me a message. You can ask me, your therapist, or your psychiatric provider to place a referral order for you.      Sincerely,    Alis Toribio, PhD  Clinical Psychologist      "

## 2025-02-07 PROBLEM — F40.10 SOCIAL ANXIETY DISORDER: Status: ACTIVE | Noted: 2025-02-07
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
## 2025-02-17 LAB — DEXAMETHASONE SERPL-MCNC: 404 NG/DL

## 2025-03-24 ENCOUNTER — HOSPITAL ENCOUNTER (OUTPATIENT)
Dept: RADIOLOGY | Facility: HOSPITAL | Age: 55
Discharge: HOME | End: 2025-03-24
Attending: FAMILY MEDICINE
Payer: COMMERCIAL

## 2025-03-24 VITALS — WEIGHT: 206 LBS | BODY MASS INDEX: 33.25 KG/M2

## 2025-03-24 DIAGNOSIS — Z12.31 OTHER SCREENING MAMMOGRAM: ICD-10-CM

## 2025-03-24 PROCEDURE — 77063 BREAST TOMOSYNTHESIS BI: CPT | Mod: 26,,, | Performed by: RADIOLOGY

## 2025-03-24 PROCEDURE — 77067 SCR MAMMO BI INCL CAD: CPT | Mod: 26,,, | Performed by: RADIOLOGY

## 2025-03-24 PROCEDURE — 77063 BREAST TOMOSYNTHESIS BI: CPT | Mod: TC

## 2025-05-20 ENCOUNTER — LAB VISIT (OUTPATIENT)
Dept: LAB | Facility: HOSPITAL | Age: 55
End: 2025-05-20
Attending: FAMILY MEDICINE
Payer: COMMERCIAL

## 2025-05-20 ENCOUNTER — OFFICE VISIT (OUTPATIENT)
Dept: INTERNAL MEDICINE | Facility: CLINIC | Age: 55
End: 2025-05-20
Payer: COMMERCIAL

## 2025-05-20 VITALS
HEART RATE: 92 BPM | WEIGHT: 201.5 LBS | BODY MASS INDEX: 32.38 KG/M2 | SYSTOLIC BLOOD PRESSURE: 148 MMHG | HEIGHT: 66 IN | DIASTOLIC BLOOD PRESSURE: 92 MMHG | OXYGEN SATURATION: 98 %

## 2025-05-20 DIAGNOSIS — E28.2 PCOS (POLYCYSTIC OVARIAN SYNDROME): ICD-10-CM

## 2025-05-20 DIAGNOSIS — F41.0 GENERALIZED ANXIETY DISORDER WITH PANIC ATTACKS: ICD-10-CM

## 2025-05-20 DIAGNOSIS — E78.5 HYPERLIPIDEMIA, UNSPECIFIED HYPERLIPIDEMIA TYPE: ICD-10-CM

## 2025-05-20 DIAGNOSIS — E66.09 CLASS 1 OBESITY DUE TO EXCESS CALORIES WITH SERIOUS COMORBIDITY AND BODY MASS INDEX (BMI) OF 32.0 TO 32.9 IN ADULT: ICD-10-CM

## 2025-05-20 DIAGNOSIS — D17.23 LIPOMA OF RIGHT LOWER EXTREMITY: ICD-10-CM

## 2025-05-20 DIAGNOSIS — E66.811 CLASS 1 OBESITY DUE TO EXCESS CALORIES WITH SERIOUS COMORBIDITY AND BODY MASS INDEX (BMI) OF 32.0 TO 32.9 IN ADULT: ICD-10-CM

## 2025-05-20 DIAGNOSIS — Z78.9 STATIN INTOLERANCE: ICD-10-CM

## 2025-05-20 DIAGNOSIS — F41.1 GENERALIZED ANXIETY DISORDER WITH PANIC ATTACKS: ICD-10-CM

## 2025-05-20 DIAGNOSIS — R06.83 SNORING: ICD-10-CM

## 2025-05-20 DIAGNOSIS — Z12.39 ENCOUNTER FOR OTHER SCREENING FOR MALIGNANT NEOPLASM OF BREAST: ICD-10-CM

## 2025-05-20 DIAGNOSIS — Z00.00 ENCOUNTER FOR ANNUAL GENERAL MEDICAL EXAMINATION WITHOUT ABNORMAL FINDINGS IN ADULT: ICD-10-CM

## 2025-05-20 DIAGNOSIS — Z12.11 ENCOUNTER FOR SCREENING FOR MALIGNANT NEOPLASM OF COLON: ICD-10-CM

## 2025-05-20 DIAGNOSIS — Z00.00 ENCOUNTER FOR ANNUAL GENERAL MEDICAL EXAMINATION WITHOUT ABNORMAL FINDINGS IN ADULT: Primary | ICD-10-CM

## 2025-05-20 DIAGNOSIS — I10 PRIMARY HYPERTENSION: ICD-10-CM

## 2025-05-20 DIAGNOSIS — R73.03 PREDIABETES: ICD-10-CM

## 2025-05-20 DIAGNOSIS — F33.1 MODERATE EPISODE OF RECURRENT MAJOR DEPRESSIVE DISORDER: ICD-10-CM

## 2025-05-20 PROBLEM — C86.60 LYMPHOMATOID PAPULOSIS: Status: ACTIVE | Noted: 2025-05-20

## 2025-05-20 PROBLEM — C86.60 LYMPHOMATOID PAPULOSIS: Status: RESOLVED | Noted: 2025-05-20 | Resolved: 2025-05-20

## 2025-05-20 LAB
ABSOLUTE EOSINOPHIL (OHS): 0.14 K/UL
ABSOLUTE MONOCYTE (OHS): 0.41 K/UL (ref 0.3–1)
ABSOLUTE NEUTROPHIL COUNT (OHS): 4.07 K/UL (ref 1.8–7.7)
ALBUMIN SERPL BCP-MCNC: 3.7 G/DL (ref 3.5–5.2)
ALP SERPL-CCNC: 91 UNIT/L (ref 40–150)
ALT SERPL W/O P-5'-P-CCNC: 17 UNIT/L (ref 10–44)
ANION GAP (OHS): 10 MMOL/L (ref 8–16)
AST SERPL-CCNC: 16 UNIT/L (ref 11–45)
BASOPHILS # BLD AUTO: 0.05 K/UL
BASOPHILS NFR BLD AUTO: 0.7 %
BILIRUB SERPL-MCNC: 0.5 MG/DL (ref 0.1–1)
BUN SERPL-MCNC: 14 MG/DL (ref 6–20)
CALCIUM SERPL-MCNC: 8.7 MG/DL (ref 8.7–10.5)
CHLORIDE SERPL-SCNC: 105 MMOL/L (ref 95–110)
CHOLEST SERPL-MCNC: 312 MG/DL (ref 120–199)
CHOLEST/HDLC SERPL: 5.1 {RATIO} (ref 2–5)
CO2 SERPL-SCNC: 26 MMOL/L (ref 23–29)
CREAT SERPL-MCNC: 0.8 MG/DL (ref 0.5–1.4)
EAG (OHS): 120 MG/DL (ref 68–131)
ERYTHROCYTE [DISTWIDTH] IN BLOOD BY AUTOMATED COUNT: 14.5 % (ref 11.5–14.5)
GFR SERPLBLD CREATININE-BSD FMLA CKD-EPI: >60 ML/MIN/1.73/M2
GLUCOSE SERPL-MCNC: 93 MG/DL (ref 70–110)
HBA1C MFR BLD: 5.8 % (ref 4–5.6)
HCT VFR BLD AUTO: 39.6 % (ref 37–48.5)
HDLC SERPL-MCNC: 61 MG/DL (ref 40–75)
HDLC SERPL: 19.6 % (ref 20–50)
HGB BLD-MCNC: 12.2 GM/DL (ref 12–16)
IMM GRANULOCYTES # BLD AUTO: 0.02 K/UL (ref 0–0.04)
IMM GRANULOCYTES NFR BLD AUTO: 0.3 % (ref 0–0.5)
LDLC SERPL CALC-MCNC: 214.2 MG/DL (ref 63–159)
LYMPHOCYTES # BLD AUTO: 2.09 K/UL (ref 1–4.8)
MCH RBC QN AUTO: 27.9 PG (ref 27–31)
MCHC RBC AUTO-ENTMCNC: 30.8 G/DL (ref 32–36)
MCV RBC AUTO: 90 FL (ref 82–98)
NONHDLC SERPL-MCNC: 251 MG/DL
NUCLEATED RBC (/100WBC) (OHS): 0 /100 WBC
PLATELET # BLD AUTO: 238 K/UL (ref 150–450)
PMV BLD AUTO: 10.5 FL (ref 9.2–12.9)
POTASSIUM SERPL-SCNC: 4 MMOL/L (ref 3.5–5.1)
PROT SERPL-MCNC: 7.1 GM/DL (ref 6–8.4)
RBC # BLD AUTO: 4.38 M/UL (ref 4–5.4)
RELATIVE EOSINOPHIL (OHS): 2.1 %
RELATIVE LYMPHOCYTE (OHS): 30.8 % (ref 18–48)
RELATIVE MONOCYTE (OHS): 6 % (ref 4–15)
RELATIVE NEUTROPHIL (OHS): 60.1 % (ref 38–73)
SODIUM SERPL-SCNC: 141 MMOL/L (ref 136–145)
T4 FREE SERPL-MCNC: 1.02 NG/DL (ref 0.71–1.51)
TRIGL SERPL-MCNC: 184 MG/DL (ref 30–150)
TSH SERPL-ACNC: 1.16 UIU/ML (ref 0.4–4)
WBC # BLD AUTO: 6.78 K/UL (ref 3.9–12.7)

## 2025-05-20 PROCEDURE — 82040 ASSAY OF SERUM ALBUMIN: CPT

## 2025-05-20 PROCEDURE — 99999 PR PBB SHADOW E&M-EST. PATIENT-LVL IV: CPT | Mod: PBBFAC,,, | Performed by: FAMILY MEDICINE

## 2025-05-20 PROCEDURE — 84439 ASSAY OF FREE THYROXINE: CPT

## 2025-05-20 PROCEDURE — 3044F HG A1C LEVEL LT 7.0%: CPT | Mod: CPTII,S$GLB,, | Performed by: FAMILY MEDICINE

## 2025-05-20 PROCEDURE — 83036 HEMOGLOBIN GLYCOSYLATED A1C: CPT

## 2025-05-20 PROCEDURE — 84443 ASSAY THYROID STIM HORMONE: CPT

## 2025-05-20 PROCEDURE — 3008F BODY MASS INDEX DOCD: CPT | Mod: CPTII,S$GLB,, | Performed by: FAMILY MEDICINE

## 2025-05-20 PROCEDURE — 36415 COLL VENOUS BLD VENIPUNCTURE: CPT

## 2025-05-20 PROCEDURE — 82465 ASSAY BLD/SERUM CHOLESTEROL: CPT

## 2025-05-20 PROCEDURE — 1160F RVW MEDS BY RX/DR IN RCRD: CPT | Mod: CPTII,S$GLB,, | Performed by: FAMILY MEDICINE

## 2025-05-20 PROCEDURE — 3080F DIAST BP >= 90 MM HG: CPT | Mod: CPTII,S$GLB,, | Performed by: FAMILY MEDICINE

## 2025-05-20 PROCEDURE — 3077F SYST BP >= 140 MM HG: CPT | Mod: CPTII,S$GLB,, | Performed by: FAMILY MEDICINE

## 2025-05-20 PROCEDURE — 85025 COMPLETE CBC W/AUTO DIFF WBC: CPT

## 2025-05-20 PROCEDURE — 1159F MED LIST DOCD IN RCRD: CPT | Mod: CPTII,S$GLB,, | Performed by: FAMILY MEDICINE

## 2025-05-20 PROCEDURE — 99396 PREV VISIT EST AGE 40-64: CPT | Mod: S$GLB,,, | Performed by: FAMILY MEDICINE

## 2025-05-20 RX ORDER — TIRZEPATIDE 5 MG/.5ML
5 INJECTION, SOLUTION SUBCUTANEOUS
Qty: 6 ML | Refills: 1 | Status: SHIPPED | OUTPATIENT
Start: 2025-05-20 | End: 2025-11-16

## 2025-05-20 RX ORDER — HYDROCHLOROTHIAZIDE 25 MG/1
25 TABLET ORAL DAILY
Qty: 90 TABLET | Refills: 3 | Status: SHIPPED | OUTPATIENT
Start: 2025-05-20 | End: 2026-05-15

## 2025-05-20 RX ORDER — VENLAFAXINE HYDROCHLORIDE 75 MG/1
75 CAPSULE, EXTENDED RELEASE ORAL DAILY
Qty: 90 CAPSULE | Refills: 1 | Status: SHIPPED | OUTPATIENT
Start: 2025-05-20 | End: 2025-11-16

## 2025-05-20 NOTE — PROGRESS NOTES
[facility]  Annual Exam/Wellness Visit    Patient Information  Isaias Collado  55 y.o. female    PCP  Bhavik Ryan MD    Reason for Visit  Annual Exam and Hip Pain      Subjective:  History of Present Illness    CHIEF COMPLAINT:  Patient presents today for a routine checkup    She reports increasing discomfort from a lipoma in the right hip area, particularly notable when lying on it at night. She describes experiencing deep pain in the area. Previous dermatology consultation resulted in no intervention due to location and depth of the lesion.    She has a history of weight gain up to 214 lbs. Since starting Weight [employer] and Zepbound 5 mg, she has lost over 10 lbs and currently weighs 201 lbs. She reports subjective improved hormonal balance and reduced inflammation on Zepbound. She denies appetite suppression, noting slow but steady weight loss. She has one vial of Zepbound remaining.    She has a history of elevated cholesterol since her 20s, with levels consistently around 300 - no recent labs for comparison. Genetic testing was negative for familial hypercholesterolemia. Previous trial of cholesterol medication (statin) was discontinued due to side effects including muscle aches and lab abnormalities.     She reports snoring and waking up tired despite subjective good sleep. No previous sleep study.    Father had bypass surgery and  of congestive heart failure. Mother takes cholesterol medication. Family history of diabetes in mother, father, and paternal family members.          Health Maintenance         Date Due Completion Date    Pneumococcal Vaccines (Age 50+) (1 of 2 - PCV) Never done ---    Shingles Vaccine (1 of 2) Never done ---    COVID-19 Vaccine ( season) 2024    Influenza Vaccine (Season Ended) 2025    Hemoglobin A1c (Prediabetes) 2026    Mammogram 2026 3/24/2025    Lipid Panel 2028  9/20/2023    TETANUS VACCINE 03/16/2030 3/16/2020    Colorectal Cancer Screening 03/25/2031 3/25/2024    RSV Vaccine (Age 60+ and Pregnant patients) (1 - 1-dose 75+ series) 05/07/2043 ---            Review of Systems   Constitutional:  Negative for activity change, chills, fatigue, fever and unexpected weight change.   HENT:  Negative for nasal congestion, ear pain, hearing loss, postnasal drip, rhinorrhea, sore throat and trouble swallowing.    Eyes:  Negative for discharge and visual disturbance.   Respiratory:  Negative for cough, chest tightness, shortness of breath and wheezing.    Cardiovascular:  Negative for chest pain and palpitations.   Gastrointestinal:  Negative for abdominal pain, blood in stool, change in bowel habit, constipation, diarrhea, nausea and vomiting.   Endocrine: Negative for polydipsia and polyuria.   Genitourinary:  Negative for difficulty urinating, dysuria, hematuria and menstrual problem.   Musculoskeletal:  Positive for arthralgias. Negative for back pain, joint swelling, leg pain and neck pain.   Neurological:  Negative for dizziness, weakness, numbness and headaches.   Hematological:  Negative for adenopathy.   Psychiatric/Behavioral:  Negative for confusion, dysphoric mood, sleep disturbance and suicidal ideas. The patient is not nervous/anxious.         Answers submitted by the patient for this visit:  Review of Systems Questionnaire (Submitted on 5/20/2025)  activity change: No  unexpected weight change: No  neck pain: No  hearing loss: No  rhinorrhea: No  trouble swallowing: No  eye discharge: No  visual disturbance: No  chest tightness: No  wheezing: No  chest pain: No  palpitations: No  blood in stool: No  constipation: Yes  vomiting: No  diarrhea: No  polydipsia: No  polyuria: No  difficulty urinating: No  hematuria: No  menstrual problem: No  dysuria: No  joint swelling: No  arthralgias: Yes  headaches: No  weakness: No  confusion: No  dysphoric mood: No          Problem  "List and History  Problem List[1]  Past Medical History[2]  Past Surgical History[3]  Social History     Substance and Sexual Activity   Sexual Activity Not on file     Tobacco Use History[4]  Social History     Substance and Sexual Activity   Alcohol Use Yes    Comment: socially      Social History     Substance and Sexual Activity   Drug Use No       Medication List  Current Medications[5]    Objective:  Vitals:    05/20/25 0827   BP: (!) 148/92   Pulse: 92   SpO2: 98%   Weight: 91.4 kg (201 lb 8 oz)   Height: 5' 6" (1.676 m)       Physical Exam  Vitals reviewed.   Constitutional:       General: She is not in acute distress.     Appearance: Normal appearance. She is not ill-appearing.   HENT:      Head: Normocephalic and atraumatic.      Right Ear: Tympanic membrane, ear canal and external ear normal. There is no impacted cerumen.      Left Ear: Tympanic membrane, ear canal and external ear normal. There is no impacted cerumen.      Nose: Nose normal. No congestion or rhinorrhea.      Mouth/Throat:      Mouth: Mucous membranes are moist.      Pharynx: Oropharynx is clear. No oropharyngeal exudate or posterior oropharyngeal erythema.   Eyes:      General: No scleral icterus.        Right eye: No discharge.         Left eye: No discharge.      Conjunctiva/sclera: Conjunctivae normal.   Neck:      Thyroid: No thyroid mass, thyromegaly or thyroid tenderness.   Cardiovascular:      Rate and Rhythm: Normal rate and regular rhythm.      Pulses: Normal pulses.      Heart sounds: Normal heart sounds. No murmur heard.     No friction rub. No gallop.   Pulmonary:      Effort: Pulmonary effort is normal. No respiratory distress.      Breath sounds: Normal breath sounds. No stridor. No wheezing, rhonchi or rales.   Abdominal:      General: Abdomen is flat. Bowel sounds are normal. There is no distension.      Palpations: Abdomen is soft. There is no mass.      Tenderness: There is no abdominal tenderness. There is no guarding. "      Hernia: No hernia is present.   Musculoskeletal:         General: No swelling or deformity. Normal range of motion.      Cervical back: Normal range of motion and neck supple. No tenderness.        Legs:    Lymphadenopathy:      Cervical: No cervical adenopathy.   Skin:     General: Skin is warm and dry.   Neurological:      General: No focal deficit present.      Mental Status: She is alert and oriented to person, place, and time. Mental status is at baseline.      Gait: Gait normal.      Deep Tendon Reflexes: Reflexes normal.   Psychiatric:         Mood and Affect: Mood normal.         Behavior: Behavior normal.         Thought Content: Thought content normal.         Judgment: Judgment normal.         Lab Results  CBC  Lab Results   Component Value Date    WBC 7.35 01/27/2025    RBC 4.61 01/27/2025    HGB 12.9 01/27/2025    HCT 41.6 01/27/2025    MCV 90 01/27/2025    MCH 28.0 01/27/2025    MCHC 31.0 (L) 01/27/2025    RDW 14.0 01/27/2025     01/27/2025    MPV 10.7 01/27/2025    GRAN 5.2 11/04/2024    GRAN 72.2 11/04/2024    LYMPH 1.5 11/04/2024    LYMPH 20.0 11/04/2024    MONO 0.4 11/04/2024    MONO 5.4 11/04/2024    EOS 0.1 11/04/2024    BASO 0.04 11/04/2024    EOSINOPHIL 1.4 11/04/2024    BASOPHIL 0.6 11/04/2024       CMP    Chemistry        Component Value Date/Time     11/04/2024 0837    K 4.1 11/04/2024 0837     11/04/2024 0837    CO2 22 (L) 11/04/2024 0837    BUN 13 11/04/2024 0837    CREATININE 0.8 11/04/2024 0837     11/04/2024 0837        Component Value Date/Time    CALCIUM 9.2 11/04/2024 0837    ALKPHOS 86 11/04/2024 0837    AST 19 11/04/2024 0837    ALT 22 11/04/2024 0837    BILITOT 0.3 11/04/2024 0837    ESTGFRAFRICA >60 09/07/2016 0543    EGFRNONAA >60 09/07/2016 0543            Lipids  Lab Results   Component Value Date    CHOL 302 (H) 09/20/2023    CHOL 253 (H) 03/23/2016      Lab Results   Component Value Date    HDL 67 09/20/2023    HDL 56 03/23/2016     Lab Results    Component Value Date    LDLCALC 195.0 (H) 09/20/2023    LDLCALC 155 (H) 03/23/2016      Lab Results   Component Value Date    TRIG 200 (H) 09/20/2023    TRIG 212 (H) 03/23/2016     Lab Results   Component Value Date    TOTALCHOLEST 4.5 09/20/2023     Lab Results   Component Value Date    NONHDLCHOL 235 09/20/2023     Lab Results   Component Value Date    CHOLHDL 22.2 09/20/2023       Thyroid Function  Lab Results   Component Value Date    TSH 1.859 01/27/2025         Diabetes Screen  Lab Results   Component Value Date    HGBA1C 6.1 (H) 01/27/2025       Other Labs  Lab Results   Component Value Date    [id number] Non-reactive 11/04/2024     Lab Results   Component Value Date    HEPCAB Non-reactive 11/04/2024       Assessment and Plan:    ICD-10-CM ICD-9-CM   1. Encounter for annual general medical examination without abnormal findings in adult  Z00.00 V70.0   2. Lipoma of right lower extremity  D17.23 214.1   3. Snoring  R06.83 786.09   4. Class 1 obesity due to excess calories with serious comorbidity and body mass index (BMI) of 32.0 to 32.9 in adult  E66.811 278.00    E66.09 V85.32    Z68.32    5. Primary hypertension  I10 401.9   6. Hyperlipidemia, unspecified hyperlipidemia type  E78.5 272.4   7. Statin intolerance  Z78.9 995.27   8. Prediabetes  R73.03 790.29   9. Moderate episode of recurrent major depressive disorder  F33.1 296.32   10. Generalized anxiety disorder with panic attacks  F41.1 300.02    F41.0 300.01   11. PCOS (polycystic ovarian syndrome)  E28.2 256.4   12. Encounter for screening for malignant neoplasm of colon  Z12.11 V76.51   13. Encounter for other screening for malignant neoplasm of breast  Z12.39 V76.19     Orders Placed This Encounter    Comprehensive Metabolic Panel    CBC Auto Differential    Lipid Panel    Hemoglobin A1C    TSH    T4, Free    Ambulatory referral/consult to Sleep Disorders    Ambulatory referral/consult to General Surgery    venlafaxine (EFFEXOR-XR) 75 MG 24 hr  capsule    hydroCHLOROthiazide (HYDRODIURIL) 25 MG tablet    tirzepatide, weight loss, (ZEPBOUND) 5 mg/0.5 mL PnIj       Assessment & Plan    - Patient qualifies for Zepbound based on BMI and high cholesterol comorbidity.  - Considered sleep study to diagnose potential sleep apnea, which would ensure insurance coverage for Zepbound.  - Evaluated cholesterol management options due to previous statin intolerance:  - Considered Zetia as alternative to statins.  - May refer to cardiology for potential Repatha treatment if cholesterol levels are appropriate for tx  - Assessed hip discomfort, potentially due to deep-seated lipoma.  - Monitoring BP due to elevated readings during visit - pt reported not taking her medication today.    1. Encounter for annual general medical examination without abnormal findings in adult (Primary)  Health maintenance updated  Chronic issues reviewed  Fasting labs ordered  - Comprehensive Metabolic Panel; Future  - CBC Auto Differential; Future  - Lipid Panel; Future  - Hemoglobin A1C; Future  - TSH; Future  - T4, Free; Future    2. Lipoma of right lower extremity  Unable to palpate on exam  Will ref to Gen Surg d/t location and estimated depth of lesion  Will defer to specialist for any imaging  - Ambulatory referral/consult to General Surgery; Future    3. Snoring  Sending for PSG to confirm suspected ALENA  Comorbidity of obesity class 1  - Ambulatory referral/consult to Sleep Disorders; Future  - tirzepatide, weight loss, (ZEPBOUND) 5 mg/0.5 mL PnIj; Inject 5 mg into the skin every 7 days.  Dispense: 6 mL; Refill: 1    4. Class 1 obesity due to excess calories with serious comorbidity and body mass index (BMI) of 32.0 to 32.9 in adult  Comorbid HTN, HLP, PreDM, suspected ALENA  Encourage healthy diet and exercise habits to optimize health and minimize chance of comorbidity development  Ordering Zepbound 5mg qwk x6 mo supply  - Comprehensive Metabolic Panel; Future  - Lipid Panel; Future  -  TSH; Future  - T4, Free; Future  - hydroCHLOROthiazide (HYDRODIURIL) 25 MG tablet; Take 1 tablet (25 mg total) by mouth once daily.  Dispense: 90 tablet; Refill: 3  - tirzepatide, weight loss, (ZEPBOUND) 5 mg/0.5 mL PnIj; Inject 5 mg into the skin every 7 days.  Dispense: 6 mL; Refill: 1    5. Primary hypertension  Comorbidity of obesity class 1  Elevated today, likely d/t not taking medication  Refilling to ensure no lapse in tx  Pt to f/u nurse visit in 2 weeks for recheck  - hydroCHLOROthiazide (HYDRODIURIL) 25 MG tablet; Take 1 tablet (25 mg total) by mouth once daily.  Dispense: 90 tablet; Refill: 3  - tirzepatide, weight loss, (ZEPBOUND) 5 mg/0.5 mL PnIj; Inject 5 mg into the skin every 7 days.  Dispense: 6 mL; Refill: 1    6. Hyperlipidemia, unspecified hyperlipidemia type  Comorbidity of obesity class 1  No recent labs, status unknown  F/u FLP  If hx is any indication, will need some sort of tx  Consider zetia and/or repatha  Has already had familial hypercholesterolemia r/o w/ genetics  - tirzepatide, weight loss, (ZEPBOUND) 5 mg/0.5 mL PnIj; Inject 5 mg into the skin every 7 days.  Dispense: 6 mL; Refill: 1    7. Statin intolerance  Unable to tolerate statin d/t myalgia side effect  Will need to consider alternative therapies    8. Prediabetes  Comorbidity of obesity class 1  Last A1c 6.1%, f/u new lab  Will need to monitor closely to prevent progression to true diabetes    9. Moderate episode of recurrent major depressive disorder  Stable, controlled  Refill effexor    10. Generalized anxiety disorder with panic attacks  Stable, controlled  Refill effexor  - venlafaxine (EFFEXOR-XR) 75 MG 24 hr capsule; Take 1 capsule (75 mg total) by mouth once daily.  Dispense: 90 capsule; Refill: 1    11. PCOS (polycystic ovarian syndrome)  Stable, managed well  Continue estrace, premarin as rx'd by specialist    12. Encounter for screening for malignant neoplasm of colon  Last Colonoscopy completed on 3/25/2024, next  due 2031    13. Encounter for other screening for malignant neoplasm of breast  Mammo UTD, next due 2026    Check-Out:  Follow-Up  Follow up in about 2 weeks (around 6/3/2025) for BP Check Nurse Visit.    Upcoming Appointments  Future Appointments   Date Time Provider Department Center   5/23/2025  8:00 AM Luis Fernando Vazquez, FNP-BC [facility] SLEEP [facility]   5/30/2025  8:00 AM Gilbert Loomis MD [facility] GENSUR [facility]   6/10/2025  8:30 AM NURSE, [facility] INTERNAL MEDICINE [facility] IM [facility]   7/24/2025  8:30 AM Arabella Rodriges MD [facility]          Bhavik Ryan MD, [facility]  Family Medicine Physician  [facility]  5/20/2025      This note was generated with the assistance of ambient listening technology. Verbal consent was obtained by the patient and accompanying visitor(s) for the recording of patient appointment to facilitate this note. I attest to having reviewed and edited the generated note for accuracy, though some syntax or spelling errors may persist. Please contact the author of this note for any clarification.                                   [1]   Patient Active Problem List  Diagnosis    PCOS (polycystic ovarian syndrome)    Prediabetes    Varicose veins of both lower extremities with pain    Venous insufficiency of both lower extremities    Generalized anxiety disorder with panic attacks    Moderate episode of recurrent major depressive disorder    Kidney stone    Adrenal adenoma, right    OAB (overactive bladder)    Social anxiety disorder    Statin intolerance    Hyperlipidemia   [2]   Past Medical History:  Diagnosis Date    Adenomyosis internal 08/2016    Allergy to ACE inhibitors 04/28/2016    Basal cell carcinoma     Dysfunctional uterine bleeding 08/2016    Hx of renal calculi     Hypertension     no problem since weight loss     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    Panic attacks     PCOS (polycystic ovarian syndrome) 08/2016    w/ insulin  resistance    Rosacea     Squamous cell carcinoma of skin     Varicose vein of leg     laser and injection tx 2016   [3]   Past Surgical History:  Procedure Laterality Date     SECTION      ;     COLONOSCOPY N/A 3/25/2024    Procedure: COLONOSCOPY;  Surgeon: Rasheed Garcia MD;  [facility] ENDOSCOPY;  Service: Endoscopy;  Laterality: N/A;  Ref by Dr LEROY Ryan,  WLMeds- Ozempic-hold x 7 days prior procedure, PEG, portal - PC  3/18- lvm and portal msg for pc- pt returned call, last dose of Ozempic was 2 months ago, pc complete. DBM    EXAMINATION UNDER ANESTHESIA N/A 2024    Procedure: EXAM UNDER ANESTHESIA EXCISION ANAL LESION;  Surgeon: Wild Nicholson MD;  Location: [facility] OR [facility] FLR;  Service: Colon and Rectal;  Laterality: N/A;    EXCISION OF RECTAL MASS  2024    Procedure: EXCISION, MASS, RECTUM;  Surgeon: Wild Nicholson MD;  Location: [facility] OR 2ND FLR;  Service: Colon and Rectal;;    EXTRACORPOREAL SHOCK WAVE LITHOTRIPSY Left 2024    Procedure: LITHOTRIPSY, ESWL 60 minutes;  Surgeon: Trino Young MD;  Location: [facility] OR;  Service: Urology;  Laterality: Left;  LMA   [phone] to confirm booking when scheduled. Confirmation # awaiting confirmation AM 24  Confirmed on 24, confirmation #[id number] ROBERTO    HYSTERECTOMY      SKIN BIOPSY      lymphomatoid papulosis    TUBAL LIGATION Bilateral     VARICOSE VEIN SURGERY Right     laser and injection   [4]   Social History  Tobacco Use   Smoking Status Former    Current packs/day: 0.00    Types: Cigarettes    Quit date: 2022    Years since quitting: 3.1   Smokeless Tobacco Never   [5]   Current Outpatient Medications:     acetaminophen (TYLENOL) 500 MG tablet, Take 1 tablet (500 mg total) by mouth every 6 (six) hours as needed for Pain., Disp: 20 tablet, Rfl: 0    ALPRAZolam (XANAX) 0.25 MG tablet, Take 1 tablet (0.25 mg total) by mouth 2 (two) times daily as needed for Anxiety.,  Disp: 60 tablet, Rfl: 0    clobetasoL (TEMOVATE) 0.05 % cream, Apply 1 application topically 2 (two) times daily., Disp: , Rfl:     clobetasol 0.05% (TEMOVATE) 0.05 % Oint, AAA on the trunk and extremities BID x4-6 weeks then D/C. Restart PRN for flares, Disp: 60 g, Rfl: 2    conjugated estrogens (PREMARIN) vaginal cream, Place 0.5 g vaginally twice a week., Disp: 1 applicator, Rfl: 3    dexAMETHasone (DECADRON) 1 MG Tab, Take 11 PM the night prior to 8 AM labs, Disp: 1 tablet, Rfl: 0    estradioL (ESTRACE) 1 MG tablet, Take 1 mg by mouth., Disp: , Rfl:     FINACEA 15 % Foam, APPLY DAILY TO SKIN TO AFFECTED AREA EVERY DAY, Disp: , Rfl:     hydroCHLOROthiazide (HYDRODIURIL) 25 MG tablet, Take 1 tablet (25 mg total) by mouth once daily., Disp: 90 tablet, Rfl: 3    tirzepatide, weight loss, (ZEPBOUND) 5 mg/0.5 mL PnIj, Inject 5 mg into the skin every 7 days., Disp: 6 mL, Rfl: 1    venlafaxine (EFFEXOR-XR) 75 MG 24 hr capsule, Take 1 capsule (75 mg total) by mouth once daily., Disp: 90 capsule, Rfl: 1

## 2025-05-23 ENCOUNTER — OFFICE VISIT (OUTPATIENT)
Dept: SLEEP MEDICINE | Facility: CLINIC | Age: 55
End: 2025-05-23
Attending: INTERNAL MEDICINE
Payer: COMMERCIAL

## 2025-05-23 VITALS
DIASTOLIC BLOOD PRESSURE: 82 MMHG | WEIGHT: 200.63 LBS | SYSTOLIC BLOOD PRESSURE: 133 MMHG | HEART RATE: 87 BPM | BODY MASS INDEX: 32.38 KG/M2

## 2025-05-23 DIAGNOSIS — G47.30 SLEEP APNEA, UNSPECIFIED TYPE: ICD-10-CM

## 2025-05-23 DIAGNOSIS — E66.811 CLASS 1 OBESITY WITH BODY MASS INDEX (BMI) OF 32.0 TO 32.9 IN ADULT, UNSPECIFIED OBESITY TYPE, UNSPECIFIED WHETHER SERIOUS COMORBIDITY PRESENT: ICD-10-CM

## 2025-05-23 DIAGNOSIS — G47.00 INSOMNIA, UNSPECIFIED TYPE: Primary | ICD-10-CM

## 2025-05-23 PROCEDURE — 99999 PR PBB SHADOW E&M-EST. PATIENT-LVL III: CPT | Mod: PBBFAC,,,

## 2025-05-23 NOTE — PATIENT INSTRUCTIONS
SLEEP LAB ([facility] or [facility]) will contact you to schedule the sleep study. Their number is [phone] (ext [phone]). Please call them if you do not hear from them in 2 weeks from now.  [facility] is located on 7th floor of the [facility]; [facility] is located in [facility] ( 3rd floor [facility]).    SLEEP CLINIC (my assistant) will call you when the sleep study results are ready or I will message you through the portal with the results as we have discussed - if you have not heard from us by 2 weeks from the date of the study, or you can use [facility] to contact me.    Our clinic phone number is [phone] (ext 1)       You are advised to abstain from driving should you feel sleepy or drowsy.

## 2025-05-23 NOTE — PROGRESS NOTES
CC: Snoring and Apnea     Referred by Bhavik Ryan MD for a sleep evaluation.     HPI     Subjective    HPI:  Sleep Apnea:  Symptoms:    [x] Loud snoring[x] Witnessed apneas [x] Gasping [x] Choking[] Interrupted Sleep [x]  Nocturia [x]  Non refreshing sleep [x] Excessive daytime sleepiness   []  Morning headaches [] Nasal Congestion [x] Dry Mouth [] Excessive Daytime Fatigue [] None  Duration:   [] Days  [] Months  [x] Years  Comments:  Patient here for ALENA evaluation.  Reports snoring, witnessed apneas, interrupted sleep, nocturia, non refreshing sleep, excessive daytime sleepiness, dry mouth. PMH significant for Obesity, PANFILO, Depression, Prediabetes.     Wake up Feeling: [] Refreshed  [x] Non refreshed [] Occasionally Tired  Do You Take Naps: [x] Yes [] No Number of Naps:       Insomnia  Symptoms: [] Difficulty Falling Asleep [x] Difficulty Staying Asleep [x] Frequent Nocturnal Awakenings [] Poor Sleep Quality [x] Non-Restorative Sleep [] Early Morning Awakening  Current Episode Severity: [x] Mild [] Moderate [] Severe  Treatments: [] Medications []  Sleep Hygiene & Stimulus Control []  Sleep Restriction []  CBTI [] Relaxation Techniques [x] None  Treatment Effects: [] Decreased Sleep Latency [] Sleep Consolidation [] Improved Sleep Quality []  Medication Side Effects [x] No Improvement  Insomnia: [] Improving [] Worsening [x] Unchanged [] Stable  Comments: Patient reports difficulty staying asleep with frequent nocturnal awakenings. Contributing factors include poor sleep hygiene: increased caffeine intake close to bedtime, possible underlying sleep apnea. Other factors include chronic shoulder pain, anxiety and depression.            5/22/2025     3:33 PM   EPWORTH SLEEPINESS SCALE TOTAL SCORE    Total score 11     (Validated Sleepiness Questionnaire with higher score indicating greater sleepiness (0-24)    STOP BANG Questionnaire  Patient diagnosed with Obstructive Sleep Apnea?: No  Has loud snoring:  Yes  Disturbed sleep, daytime fatigue, daytime somnolence: Yes  Observed to have interrupted breathing during sleep: Yes  Takes medication for high blood pressure: No  Not taking BP medication but supposed to be: No  Recent BMI (Calculated): 32.5  Is BMI greater than 35 kg/m2?: 0=No  Age older than 50 years old?: 1=Yes  Has large neck size >40cm (15.7in., large male shirt size, large male collar size >16): Yes  Gender - Male: 0=No  STOP-Bang Total Score: 5  (Validated Stop Bang Questionnaire with higher score indicating greater risk of ALENA (0-2, 3-4, 5-8)) Risk: Moderate        5/22/2025     3:37 PM   Sleep Clinic New Patient   What time do you go to bed on a week day? (Give a range) 10 to 10:30   What time do you go to bed on a day off? (Give a range) 12 to 1   How long does it take you to fall asleep? (Give a range) 10 to 15 minutes   On average, how many times per night do you wake up? 5-6   How long does it take you to fall back into sleep? (Give a range) 1-5 minutes   What time do you wake up to start your day on a week day? (Give a range) 6am   What time do you wake up to start your day on a day off? (Give a range) 7am   What time do you get out of bed? (Give a range) 7-8 am   On average, how many hours do you sleep? 8   On average, how many naps do you take per day? 0   Rate your sleep quality from 0 to 5 (0-poor, 5-great). 4   Have you experienced:  Weight gain?   How much weight have you lost or gained (in lbs.) in the last year? 25 gained   On average, how many times per night do you go to the bathroom?  2-3   Have you ever had a sleep study/CPAP machine/surgery for sleep apnea? No   Have you ever had a CPAP machine for sleep apnea? No   Have you ever had surgery for sleep apnea? No       Current Sleep Medication(s): None     Previous Sleep Medication(s): None     Sleep Factors:  Sleep Environment: Cool, Dark, and Comfortable  Caffeine: 2-3 beverages, last beverage at  10 pm  Bed Partner: spouse, living  "together  Alcohol: none  Sleep Disorder Family History: Obstructive Sleep Apnea mother  Sleep Problems: caffeine intake close to bedtime  chronic pain        Objective    Records Reviewed:   Lab Results   Component Value Date    TSH 1.161 05/20/2025    CO2 26 05/20/2025    HGBA1C 5.8 (H) 05/20/2025      No echocardiogram results found for the past 12 months  Sleep Studies : None    Objective:  Vitals: Blood pressure 133/82, pulse 87, weight 91 kg (200 lb 9.9 oz), last menstrual period 08/26/2016.   Exam:  Mallampati Score: III (soft and hard palate and base of uvula visible)  Neck: Size:  15.5"  Gen: Well Appearing, demonstrates insight  Skin: No rash or lesions on bridge of nose or mouth          Assessment & Plan  Sleep apnea, unspecified type  Diagnostic Testing: Home Sleep Apnea Test (HST) is indicated due to comorbid conditions: Obesity, Impaired Glucose Tolerance, Anxiety, and Depression with symptoms: snoring, gasping for air, choking , witnessed apneas, interrupted sleep, nocturia, restless sleep, non refreshing sleep, and excessive daytime sleepiness, ESS: 11/24  Education & Treatment Options:  Discussed the etiology and consequences of untreated ALENA, including risks of cardiovascular disease, hypertension, stroke, metabolic dysfunction, and excessive daytime sleepiness.  Reviewed treatment options:  Positive Airway Pressure (PAP) therapy, Weight loss Positional therapy, Oral appliance therapy  Lifestyle Modifications: Advised weight management, alcohol reduction, and optimizing sleep hygiene.  Safety Precautions: Recommended avoiding driving if experiencing excessive daytime sleepiness.  Next Steps:  If ALENA is confirmed, patient agrees to trial APAP  If PAP therapy is initiated, follow-up within 31-90 days to assess compliance and effectiveness.  [facility]   Orders:    Ambulatory referral/consult to Sleep Disorders    Home Sleep Study; Future    Insomnia, unspecified " "type  Discussed sleep hygiene measures including regular sleep schedule, optimal sleep environment, and relaxing presleep rituals.  Avoid caffeine after noon.  Recommended daily exercise.  Educational materials distributed.       Class 1 obesity with body mass index (BMI) of 32.0 to 32.9 in adult, unspecified obesity type, unspecified whether serious comorbidity present  Previously on zepbound difficult to afford through weight watchers.   Continue medication if possible    Estimated body mass index is 32.38 kg/m² as calculated from the following:    Height as of 5/20/25: 5' 6" (1.676 m).    Weight as of this encounter: 91 kg (200 lb 9.9 oz).    For Adults 20 Years and Older:    BMI Weight Status   Below 18.5 Underweight   18.6-24.9 Normal/Healthy   25.0-29.9 Overweight   30.0 & Above Obese     Ideal Weight Range for Your Height: 5'6" = 118 - 154 lbs                            "

## 2025-05-30 ENCOUNTER — OFFICE VISIT (OUTPATIENT)
Dept: SURGERY | Facility: CLINIC | Age: 55
End: 2025-05-30
Payer: COMMERCIAL

## 2025-05-30 VITALS
TEMPERATURE: 99 F | HEIGHT: 66 IN | BODY MASS INDEX: 32.29 KG/M2 | WEIGHT: 200.94 LBS | HEART RATE: 84 BPM | DIASTOLIC BLOOD PRESSURE: 85 MMHG | SYSTOLIC BLOOD PRESSURE: 129 MMHG

## 2025-05-30 DIAGNOSIS — D17.23 LIPOMA OF RIGHT LOWER EXTREMITY: Primary | ICD-10-CM

## 2025-05-30 PROCEDURE — 99999 PR PBB SHADOW E&M-EST. PATIENT-LVL V: CPT | Mod: PBBFAC,,, | Performed by: STUDENT IN AN ORGANIZED HEALTH CARE EDUCATION/TRAINING PROGRAM

## 2025-05-30 NOTE — H&P (VIEW-ONLY)
Patient ID: Isaias Collado is a 55 y.o. female.    Chief Complaint: No chief complaint on file.      HPI:  HPI  55F with right gluteal subq mass for years. Feels like it is overall enlarging with time. More bothersome. No drainage. No procedure on it.       Review of Systems   Constitutional:  Negative for fever.   HENT:  Negative for trouble swallowing.    Respiratory:  Negative for shortness of breath.    Cardiovascular:  Negative for chest pain.   Gastrointestinal:  Negative for abdominal pain, blood in stool, nausea and vomiting.   Genitourinary:  Negative for dysuria.   Musculoskeletal:  Negative for gait problem.   Skin:  Negative for rash and wound.   Allergic/Immunologic: Negative for immunocompromised state.   Neurological:  Negative for weakness.   Hematological:  Does not bruise/bleed easily.   Psychiatric/Behavioral:  Negative for agitation.        Current Medications[1]    Review of patient's allergies indicates:   Allergen Reactions    Lisinopril Other (See Comments)     Cough.    Erythromycin Other (See Comments)     Nausea and cold sweats    Morphine Itching    Adhesive Rash     Paper tape ok       Past Medical History:   Diagnosis Date    Adenomyosis internal 2016    Allergy to ACE inhibitors 2016    Basal cell carcinoma     Dysfunctional uterine bleeding 2016    Hx of renal calculi     Hypertension     no problem since weight loss     Lymphomatoid papulosis 2025    Panic attacks     PCOS (polycystic ovarian syndrome) 2016    w/ insulin resistance    Rosacea     Squamous cell carcinoma of skin     Varicose vein of leg     laser and injection tx        Past Surgical History:   Procedure Laterality Date     SECTION      ;     COLONOSCOPY N/A 3/25/2024    Procedure: COLONOSCOPY;  Surgeon: Rasheed Garcia MD;  Location: [facility] ENDOSCOPY;  Service: Endoscopy;  Laterality: N/A;  Ref by Dr LEROY Ryan,  Lupe- Ozempic-hold x 7 days prior procedure, PEG, portal -  PC  3/18- lvm and portal msg for pc- pt returned call, last dose of Ozempic was 2 months ago, pc complete. DBM    EXAMINATION UNDER ANESTHESIA N/A 2/28/2024    Procedure: EXAM UNDER ANESTHESIA EXCISION ANAL LESION;  Surgeon: Wild Nicholson MD;  Location: [facility] OR 2ND FLR;  Service: Colon and Rectal;  Laterality: N/A;    EXCISION OF RECTAL MASS  2/28/2024    Procedure: EXCISION, MASS, RECTUM;  Surgeon: Wild Nicholson MD;  Location: [facility] OR 2ND FLR;  Service: Colon and Rectal;;    EXTRACORPOREAL SHOCK WAVE LITHOTRIPSY Left 7/11/2024    Procedure: LITHOTRIPSY, ESWL 60 minutes;  Surgeon: Trino Young MD;  Location: [facility] OR;  Service: Urology;  Laterality: Left;  LMA   Please call [employer] [phone] to confirm booking when scheduled. Confirmation # awaiting confirmation AM 5/24/24  Confirmed on 5/30/24, confirmation #[id number] ROBERTO    HYSTERECTOMY      SKIN BIOPSY      lymphomatoid papulosis    TUBAL LIGATION Bilateral     VARICOSE VEIN SURGERY Right     laser and injection       Social History[2]    Vitals:    05/30/25 0806   BP: 129/85   Pulse: 84   Temp: 98.6 °F (37 °C)       Physical Exam  Constitutional:       General: She is not in acute distress.     Appearance: She is well-developed.   HENT:      Head: Normocephalic and atraumatic.   Eyes:      General: No scleral icterus.  Cardiovascular:      Rate and Rhythm: Normal rate.   Pulmonary:      Effort: Pulmonary effort is normal.      Breath sounds: No stridor.   Abdominal:      General: There is no distension.      Palpations: Abdomen is soft.      Tenderness: There is no abdominal tenderness.   Lymphadenopathy:      Cervical: No cervical adenopathy.   Skin:     General: Skin is warm.      Findings: No erythema.          Neurological:      Mental Status: She is alert and oriented to person, place, and time.   Psychiatric:         Behavior: Behavior normal.     Body mass index is 32.43 kg/m².  CT reviewed - no apparent    Assessment & Plan:  55F  with right gluteal mass, 2cm  Palpable  Plan for excision in OR June 17th  Stop zepbound the week before             [1]   Current Outpatient Medications   Medication Sig Dispense Refill    acetaminophen (TYLENOL) 500 MG tablet Take 1 tablet (500 mg total) by mouth every 6 (six) hours as needed for Pain. 20 tablet 0    clobetasoL (TEMOVATE) 0.05 % cream Apply 1 application topically 2 (two) times daily.      clobetasol 0.05% (TEMOVATE) 0.05 % Oint AAA on the trunk and extremities BID x4-6 weeks then D/C. Restart PRN for flares 60 g 2    conjugated estrogens (PREMARIN) vaginal cream Place 0.5 g vaginally twice a week. 1 applicator 3    dexAMETHasone (DECADRON) 1 MG Tab Take 11 PM the night prior to 8 AM labs 1 tablet 0    estradioL (ESTRACE) 1 MG tablet Take 1 mg by mouth.      FINACEA 15 % Foam APPLY DAILY TO SKIN TO AFFECTED AREA EVERY DAY      hydroCHLOROthiazide (HYDRODIURIL) 25 MG tablet Take 1 tablet (25 mg total) by mouth once daily. 90 tablet 3    tirzepatide, weight loss, (ZEPBOUND) 5 mg/0.5 mL PnIj Inject 5 mg into the skin every 7 days. 6 mL 1    venlafaxine (EFFEXOR-XR) 75 MG 24 hr capsule Take 1 capsule (75 mg total) by mouth once daily. 90 capsule 1    ALPRAZolam (XANAX) 0.25 MG tablet Take 1 tablet (0.25 mg total) by mouth 2 (two) times daily as needed for Anxiety. 60 tablet 0     No current facility-administered medications for this visit.   [2]   Social History  Socioeconomic History    Marital status:    Tobacco Use    Smoking status: Former     Current packs/day: 0.00     Types: Cigarettes     Quit date: 4/12/2022     Years since quitting: 3.1    Smokeless tobacco: Never   Substance and Sexual Activity    Alcohol use: Yes     Comment: socially     Drug use: No   Social History Narrative    ** Merged History Encounter **          Social Drivers of Health     Financial Resource Strain: Medium Risk (5/20/2025)    Overall Financial Resource Strain (CARDIA)     Difficulty of Paying Living  Expenses: Somewhat hard   Food Insecurity: No Food Insecurity (5/20/2025)    Hunger Vital Sign     Worried About Running Out of Food in the Last Year: Never true     Ran Out of Food in the Last Year: Never true   Transportation Needs: No Transportation Needs (5/20/2025)    PRAPARE - Transportation     Lack of Transportation (Medical): No     Lack of Transportation (Non-Medical): No   Physical Activity: Insufficiently Active (5/20/2025)    Exercise Vital Sign     Days of Exercise per Week: 3 days     Minutes of Exercise per Session: 20 min   Stress: Stress Concern Present (5/20/2025)    [employer] [place] [employer] - Occupational Stress Questionnaire     Feeling of Stress : To some extent   Housing Stability: Low Risk  (5/20/2025)    Housing Stability Vital Sign     Unable to Pay for Housing in the Last Year: No     Number of Times Moved in the Last Year: 0     Homeless in the Last Year: No

## 2025-05-30 NOTE — PROGRESS NOTES
Patient ID: Isaias Collado is a 55 y.o. female.    Chief Complaint: No chief complaint on file.      HPI:  HPI  55F with right gluteal subq mass for years. Feels like it is overall enlarging with time. More bothersome. No drainage. No procedure on it.       Review of Systems   Constitutional:  Negative for fever.   HENT:  Negative for trouble swallowing.    Respiratory:  Negative for shortness of breath.    Cardiovascular:  Negative for chest pain.   Gastrointestinal:  Negative for abdominal pain, blood in stool, nausea and vomiting.   Genitourinary:  Negative for dysuria.   Musculoskeletal:  Negative for gait problem.   Skin:  Negative for rash and wound.   Allergic/Immunologic: Negative for immunocompromised state.   Neurological:  Negative for weakness.   Hematological:  Does not bruise/bleed easily.   Psychiatric/Behavioral:  Negative for agitation.        Current Medications[1]    Review of patient's allergies indicates:   Allergen Reactions    Lisinopril Other (See Comments)     Cough.    Erythromycin Other (See Comments)     Nausea and cold sweats    Morphine Itching    Adhesive Rash     Paper tape ok       Past Medical History:   Diagnosis Date    Adenomyosis internal 2016    Allergy to ACE inhibitors 2016    Basal cell carcinoma     Dysfunctional uterine bleeding 2016    Hx of renal calculi     Hypertension     no problem since weight loss     Lymphomatoid papulosis 2025    Panic attacks     PCOS (polycystic ovarian syndrome) 2016    w/ insulin resistance    Rosacea     Squamous cell carcinoma of skin     Varicose vein of leg     laser and injection tx        Past Surgical History:   Procedure Laterality Date     SECTION      ;     COLONOSCOPY N/A 3/25/2024    Procedure: COLONOSCOPY;  Surgeon: Rasheed Garcia MD;  Location: [facility] ENDOSCOPY;  Service: Endoscopy;  Laterality: N/A;  Ref by Dr LEROY Ryan,  Lupe- Ozempic-hold x 7 days prior procedure, PEG, portal -  PC  3/18- lvm and portal msg for pc- pt returned call, last dose of Ozempic was 2 months ago, pc complete. DBM    EXAMINATION UNDER ANESTHESIA N/A 2/28/2024    Procedure: EXAM UNDER ANESTHESIA EXCISION ANAL LESION;  Surgeon: Wild Nicholson MD;  Location: [facility] OR 2ND FLR;  Service: Colon and Rectal;  Laterality: N/A;    EXCISION OF RECTAL MASS  2/28/2024    Procedure: EXCISION, MASS, RECTUM;  Surgeon: Wild Nicholson MD;  Location: [facility] OR 2ND FLR;  Service: Colon and Rectal;;    EXTRACORPOREAL SHOCK WAVE LITHOTRIPSY Left 7/11/2024    Procedure: LITHOTRIPSY, ESWL 60 minutes;  Surgeon: Trino Young MD;  Location: [facility] OR;  Service: Urology;  Laterality: Left;  LMA   Please call [employer] [phone] to confirm booking when scheduled. Confirmation # awaiting confirmation AM 5/24/24  Confirmed on 5/30/24, confirmation #[id number] ROBERTO    HYSTERECTOMY      SKIN BIOPSY      lymphomatoid papulosis    TUBAL LIGATION Bilateral     VARICOSE VEIN SURGERY Right     laser and injection       Social History[2]    Vitals:    05/30/25 0806   BP: 129/85   Pulse: 84   Temp: 98.6 °F (37 °C)       Physical Exam  Constitutional:       General: She is not in acute distress.     Appearance: She is well-developed.   HENT:      Head: Normocephalic and atraumatic.   Eyes:      General: No scleral icterus.  Cardiovascular:      Rate and Rhythm: Normal rate.   Pulmonary:      Effort: Pulmonary effort is normal.      Breath sounds: No stridor.   Abdominal:      General: There is no distension.      Palpations: Abdomen is soft.      Tenderness: There is no abdominal tenderness.   Lymphadenopathy:      Cervical: No cervical adenopathy.   Skin:     General: Skin is warm.      Findings: No erythema.          Neurological:      Mental Status: She is alert and oriented to person, place, and time.   Psychiatric:         Behavior: Behavior normal.     Body mass index is 32.43 kg/m².  CT reviewed - no apparent    Assessment & Plan:  55F  with right gluteal mass, 2cm  Palpable  Plan for excision in OR June 17th  Stop zepbound the week before             [1]   Current Outpatient Medications   Medication Sig Dispense Refill    acetaminophen (TYLENOL) 500 MG tablet Take 1 tablet (500 mg total) by mouth every 6 (six) hours as needed for Pain. 20 tablet 0    clobetasoL (TEMOVATE) 0.05 % cream Apply 1 application topically 2 (two) times daily.      clobetasol 0.05% (TEMOVATE) 0.05 % Oint AAA on the trunk and extremities BID x4-6 weeks then D/C. Restart PRN for flares 60 g 2    conjugated estrogens (PREMARIN) vaginal cream Place 0.5 g vaginally twice a week. 1 applicator 3    dexAMETHasone (DECADRON) 1 MG Tab Take 11 PM the night prior to 8 AM labs 1 tablet 0    estradioL (ESTRACE) 1 MG tablet Take 1 mg by mouth.      FINACEA 15 % Foam APPLY DAILY TO SKIN TO AFFECTED AREA EVERY DAY      hydroCHLOROthiazide (HYDRODIURIL) 25 MG tablet Take 1 tablet (25 mg total) by mouth once daily. 90 tablet 3    tirzepatide, weight loss, (ZEPBOUND) 5 mg/0.5 mL PnIj Inject 5 mg into the skin every 7 days. 6 mL 1    venlafaxine (EFFEXOR-XR) 75 MG 24 hr capsule Take 1 capsule (75 mg total) by mouth once daily. 90 capsule 1    ALPRAZolam (XANAX) 0.25 MG tablet Take 1 tablet (0.25 mg total) by mouth 2 (two) times daily as needed for Anxiety. 60 tablet 0     No current facility-administered medications for this visit.   [2]   Social History  Socioeconomic History    Marital status:    Tobacco Use    Smoking status: Former     Current packs/day: 0.00     Types: Cigarettes     Quit date: 4/12/2022     Years since quitting: 3.1    Smokeless tobacco: Never   Substance and Sexual Activity    Alcohol use: Yes     Comment: socially     Drug use: No   Social History Narrative    ** Merged History Encounter **          Social Drivers of Health     Financial Resource Strain: Medium Risk (5/20/2025)    Overall Financial Resource Strain (CARDIA)     Difficulty of Paying Living  Expenses: Somewhat hard   Food Insecurity: No Food Insecurity (5/20/2025)    Hunger Vital Sign     Worried About Running Out of Food in the Last Year: Never true     Ran Out of Food in the Last Year: Never true   Transportation Needs: No Transportation Needs (5/20/2025)    PRAPARE - Transportation     Lack of Transportation (Medical): No     Lack of Transportation (Non-Medical): No   Physical Activity: Insufficiently Active (5/20/2025)    Exercise Vital Sign     Days of Exercise per Week: 3 days     Minutes of Exercise per Session: 20 min   Stress: Stress Concern Present (5/20/2025)    [employer] [place] [employer] - Occupational Stress Questionnaire     Feeling of Stress : To some extent   Housing Stability: Low Risk  (5/20/2025)    Housing Stability Vital Sign     Unable to Pay for Housing in the Last Year: No     Number of Times Moved in the Last Year: 0     Homeless in the Last Year: No

## 2025-06-03 ENCOUNTER — TELEPHONE (OUTPATIENT)
Dept: SLEEP MEDICINE | Facility: OTHER | Age: 55
End: 2025-06-03
Payer: COMMERCIAL

## 2025-06-05 ENCOUNTER — TELEPHONE (OUTPATIENT)
Dept: SLEEP MEDICINE | Facility: OTHER | Age: 55
End: 2025-06-05
Payer: COMMERCIAL

## 2025-06-06 ENCOUNTER — HOSPITAL ENCOUNTER (OUTPATIENT)
Dept: SLEEP MEDICINE | Facility: OTHER | Age: 55
Discharge: HOME | End: 2025-06-06
Payer: COMMERCIAL

## 2025-06-06 DIAGNOSIS — G47.33 OSA (OBSTRUCTIVE SLEEP APNEA): Primary | ICD-10-CM

## 2025-06-06 DIAGNOSIS — G47.30 SLEEP APNEA, UNSPECIFIED TYPE: ICD-10-CM

## 2025-06-06 PROCEDURE — 95800 SLP STDY UNATTENDED: CPT

## 2025-06-09 PROBLEM — G47.30 SLEEP APNEA: Status: ACTIVE | Noted: 2025-06-09

## 2025-06-10 ENCOUNTER — HOSPITAL ENCOUNTER (OUTPATIENT)
Dept: PREADMISSION TESTING | Facility: HOSPITAL | Age: 55
Discharge: HOME | End: 2025-06-10
Attending: NURSE PRACTITIONER
Payer: COMMERCIAL

## 2025-06-10 ENCOUNTER — PATIENT MESSAGE (OUTPATIENT)
Dept: PREADMISSION TESTING | Facility: HOSPITAL | Age: 55
End: 2025-06-10

## 2025-06-10 ENCOUNTER — CLINICAL SUPPORT (OUTPATIENT)
Dept: INTERNAL MEDICINE | Facility: CLINIC | Age: 55
End: 2025-06-10
Payer: COMMERCIAL

## 2025-06-10 VITALS — OXYGEN SATURATION: 98 % | SYSTOLIC BLOOD PRESSURE: 120 MMHG | HEART RATE: 76 BPM | DIASTOLIC BLOOD PRESSURE: 82 MMHG

## 2025-06-10 DIAGNOSIS — I10 PRIMARY HYPERTENSION: Primary | ICD-10-CM

## 2025-06-10 PROCEDURE — 99999 PR PBB SHADOW E&M-EST. PATIENT-LVL II: CPT | Mod: PBBFAC,,,

## 2025-06-10 NOTE — PROGRESS NOTES
Pt here for BP nurse visit, 2 pt identifiers used. Pt states she only takes a diuretic but takes it as needed. Pt's BP was taken manually and read 120/82, P:76, O2sat: 98%, vitals have been updated in the chart

## 2025-06-10 NOTE — DISCHARGE INSTRUCTIONS

## 2025-06-10 NOTE — PRE-PROCEDURE INSTRUCTIONS
Arranging a ride home.    Allergies, medical, surgical, family and psychosocial histories reviewed with patient. Periop plan of care reviewed. Preop instructions given, including medications to take and to hold. Hibiclens soap and instructions on use given. Time allotted for questions to be addressed.      Arrival time 0830.    Surgery instructions reviewed and surgery booklet sent or emailed to the patient via the portal.

## 2025-06-11 ENCOUNTER — OFFICE VISIT (OUTPATIENT)
Dept: PSYCHIATRY | Facility: CLINIC | Age: 55
End: 2025-06-11
Payer: COMMERCIAL

## 2025-06-11 DIAGNOSIS — Z13.39 ADHD (ATTENTION DEFICIT HYPERACTIVITY DISORDER) EVALUATION: ICD-10-CM

## 2025-06-11 DIAGNOSIS — F41.1 GENERALIZED ANXIETY DISORDER WITH PANIC ATTACKS: Primary | ICD-10-CM

## 2025-06-11 DIAGNOSIS — F41.0 GENERALIZED ANXIETY DISORDER WITH PANIC ATTACKS: Primary | ICD-10-CM

## 2025-06-11 PROCEDURE — 1159F MED LIST DOCD IN RCRD: CPT | Mod: CPTII,S$GLB,, | Performed by: SOCIAL WORKER

## 2025-06-11 PROCEDURE — 99999 PR PBB SHADOW E&M-EST. PATIENT-LVL I: CPT | Mod: PBBFAC,,, | Performed by: SOCIAL WORKER

## 2025-06-11 PROCEDURE — 90837 PSYTX W PT 60 MINUTES: CPT | Mod: S$GLB,,, | Performed by: SOCIAL WORKER

## 2025-06-11 PROCEDURE — 3044F HG A1C LEVEL LT 7.0%: CPT | Mod: CPTII,S$GLB,, | Performed by: SOCIAL WORKER

## 2025-06-11 PROCEDURE — 90785 PSYTX COMPLEX INTERACTIVE: CPT | Mod: S$GLB,,, | Performed by: SOCIAL WORKER

## 2025-06-11 NOTE — PROGRESS NOTES
"Individual Psychotherapy (LCSW/PhD)  Isaias Collado,  6/11/2025    Site:  [facility]         Therapeutic Intervention: Met with patient for individual psychotherapy.    Chief complaint/reason for encounter: depression and anxiety     Interval history and content of current session: Pt reported she is still working on her [employer] allen; which has been successful. Pt reported her son was suppose to get a summer job. PT reported her son is helping her with the "side gig." PT reported she did get him a car and a phone. PT reported she is starting to set boundaries with her son's (Danis's) dad. PT reported they are making different plans for [place] this year. PT reported they are going to [place] this [place]. Daughter moved to [place] a month ago. Pt reported her  retired from the , but he has a contract job in [place]. PT reported she is trying to work with Danis to take responsibility for chores. Pt reported she wants to retire a [place] further north, maybe [place]. She reported this is a conversation she has had with her mother. We ended with guided deep breathing to increase relaxation. She denies SI, no HI or AVH.     her son has a friends now. Pt reported he had a party the other night for the fight. Pt reported her son has been branching out. She reported she is waiting on her transmission for her car to come in. Pt reported her mom came in for a week recently. Pt reported her mom cannot do what she use to do. PT reported she still has her booths. Pt requested adult ADHD testing. She reported having a hard time staying on topic. PT reported if something does not interest her, she has a hard time focusing and absorbing information. Pt reported she has a difficult time staying on task. PT reported she has left food on the stove and the fire alarm goes off. PT reported it affects her everyday life, especially at home and work. Pt reported there is a lot of procrastination. Therapist " sent a referral to psych testing for ADHD. We also discussed her weight. She reported she started Weight [employer]. Therapist completed her referral to psych testing. Note sent to Teodora Shelton for scheduling. She denies SI, no HI or AVH.     Treatment plan:  Target symptoms: depression, anxiety   Why chosen therapy is appropriate versus another modality: relevant to diagnosis, patient responds to this modality, evidence based practice  Outcome monitoring methods: self-report, observation, checklist/rating scale  Therapeutic intervention type: insight oriented psychotherapy, behavior modifying psychotherapy, supportive psychotherapy    Risk parameters:  Patient reports no suicidal ideation  Patient reports no homicidal ideation  Patient reports no self-injurious behavior  Patient reports no violent behavior    Verbal deficits: None    Patient's response to intervention:  The patient's response to intervention is accepting, motivated.    Progress toward goals and other mental status changes:  The patient's progress toward goals is fair .    Diagnosis:     ICD-10-CM ICD-9-CM   1. Generalized anxiety disorder with panic attacks  F41.1 300.02    F41.0 300.01   2. ADHD (attention deficit hyperactivity disorder) evaluation  Z13.39 V79.8           Plan: Pt plans to continue individual psychotherapy    Return to clinic: as scheduled    Length of Service (minutes): 60

## 2025-06-17 ENCOUNTER — ANESTHESIA EVENT (OUTPATIENT)
Dept: SURGERY | Facility: HOSPITAL | Age: 55
End: 2025-06-17
Payer: COMMERCIAL

## 2025-06-17 ENCOUNTER — PATIENT MESSAGE (OUTPATIENT)
Dept: SLEEP MEDICINE | Facility: CLINIC | Age: 55
End: 2025-06-17

## 2025-06-17 ENCOUNTER — HOSPITAL ENCOUNTER (OUTPATIENT)
Facility: HOSPITAL | Age: 55
Discharge: HOME | End: 2025-06-17
Attending: STUDENT IN AN ORGANIZED HEALTH CARE EDUCATION/TRAINING PROGRAM | Admitting: STUDENT IN AN ORGANIZED HEALTH CARE EDUCATION/TRAINING PROGRAM
Payer: COMMERCIAL

## 2025-06-17 ENCOUNTER — ANESTHESIA (OUTPATIENT)
Dept: SURGERY | Facility: HOSPITAL | Age: 55
End: 2025-06-17
Payer: COMMERCIAL

## 2025-06-17 DIAGNOSIS — D17.23 LIPOMA OF RIGHT LOWER EXTREMITY: Primary | ICD-10-CM

## 2025-06-17 PROBLEM — G47.33 OSA (OBSTRUCTIVE SLEEP APNEA): Status: ACTIVE | Noted: 2025-06-09

## 2025-06-17 PROCEDURE — 63600175 PHARM REV CODE 636 W HCPCS: Performed by: STUDENT IN AN ORGANIZED HEALTH CARE EDUCATION/TRAINING PROGRAM

## 2025-06-17 PROCEDURE — 36000707: Performed by: STUDENT IN AN ORGANIZED HEALTH CARE EDUCATION/TRAINING PROGRAM

## 2025-06-17 PROCEDURE — 95800 SLP STDY UNATTENDED: CPT | Mod: 26,,, | Performed by: PSYCHIATRY & NEUROLOGY

## 2025-06-17 PROCEDURE — 37000008 HC ANESTHESIA 1ST 15 MINUTES: Performed by: STUDENT IN AN ORGANIZED HEALTH CARE EDUCATION/TRAINING PROGRAM

## 2025-06-17 PROCEDURE — 88307 TISSUE EXAM BY PATHOLOGIST: CPT | Mod: TC | Performed by: STUDENT IN AN ORGANIZED HEALTH CARE EDUCATION/TRAINING PROGRAM

## 2025-06-17 PROCEDURE — 71000015 HC POSTOP RECOV 1ST HR: Performed by: STUDENT IN AN ORGANIZED HEALTH CARE EDUCATION/TRAINING PROGRAM

## 2025-06-17 PROCEDURE — 63600175 PHARM REV CODE 636 W HCPCS: Performed by: NURSE ANESTHETIST, CERTIFIED REGISTERED

## 2025-06-17 PROCEDURE — 36000706: Performed by: STUDENT IN AN ORGANIZED HEALTH CARE EDUCATION/TRAINING PROGRAM

## 2025-06-17 PROCEDURE — 37000009 HC ANESTHESIA EA ADD 15 MINS: Performed by: STUDENT IN AN ORGANIZED HEALTH CARE EDUCATION/TRAINING PROGRAM

## 2025-06-17 PROCEDURE — 71000016 HC POSTOP RECOV ADDL HR: Performed by: STUDENT IN AN ORGANIZED HEALTH CARE EDUCATION/TRAINING PROGRAM

## 2025-06-17 RX ORDER — LIDOCAINE HYDROCHLORIDE 20 MG/ML
INJECTION INTRAVENOUS
Status: DISCONTINUED | OUTPATIENT
Start: 2025-06-17 | End: 2025-06-17

## 2025-06-17 RX ORDER — PROPOFOL 10 MG/ML
VIAL (ML) INTRAVENOUS
Status: DISCONTINUED | OUTPATIENT
Start: 2025-06-17 | End: 2025-06-17

## 2025-06-17 RX ORDER — GLUCAGON 1 MG
1 KIT INJECTION
Status: DISCONTINUED | OUTPATIENT
Start: 2025-06-17 | End: 2025-06-17 | Stop reason: HOSPADM

## 2025-06-17 RX ORDER — HYDROCODONE BITARTRATE AND ACETAMINOPHEN 5; 325 MG/1; MG/1
1 TABLET ORAL EVERY 4 HOURS PRN
Status: DISCONTINUED | OUTPATIENT
Start: 2025-06-17 | End: 2025-06-17 | Stop reason: HOSPADM

## 2025-06-17 RX ORDER — PROPOFOL 10 MG/ML
VIAL (ML) INTRAVENOUS CONTINUOUS PRN
Status: DISCONTINUED | OUTPATIENT
Start: 2025-06-17 | End: 2025-06-17

## 2025-06-17 RX ORDER — ACETAMINOPHEN 10 MG/ML
INJECTION, SOLUTION INTRAVENOUS
Status: DISCONTINUED | OUTPATIENT
Start: 2025-06-17 | End: 2025-06-17

## 2025-06-17 RX ORDER — OXYCODONE HYDROCHLORIDE 5 MG/1
5 TABLET ORAL
Status: DISCONTINUED | OUTPATIENT
Start: 2025-06-17 | End: 2025-06-17 | Stop reason: HOSPADM

## 2025-06-17 RX ORDER — FENTANYL CITRATE 50 UG/ML
INJECTION, SOLUTION INTRAMUSCULAR; INTRAVENOUS
Status: DISCONTINUED | OUTPATIENT
Start: 2025-06-17 | End: 2025-06-17

## 2025-06-17 RX ORDER — HYDROCODONE BITARTRATE AND ACETAMINOPHEN 5; 325 MG/1; MG/1
1 TABLET ORAL EVERY 4 HOURS PRN
Qty: 10 TABLET | Refills: 0 | Status: SHIPPED | OUTPATIENT
Start: 2025-06-17

## 2025-06-17 RX ORDER — DEXAMETHASONE SODIUM PHOSPHATE 4 MG/ML
INJECTION, SOLUTION INTRA-ARTICULAR; INTRALESIONAL; INTRAMUSCULAR; INTRAVENOUS; SOFT TISSUE
Status: DISCONTINUED | OUTPATIENT
Start: 2025-06-17 | End: 2025-06-17

## 2025-06-17 RX ORDER — SODIUM CHLORIDE 0.9 % (FLUSH) 0.9 %
10 SYRINGE (ML) INJECTION
Status: DISCONTINUED | OUTPATIENT
Start: 2025-06-17 | End: 2025-06-17 | Stop reason: HOSPADM

## 2025-06-17 RX ORDER — BUPIVACAINE HYDROCHLORIDE 2.5 MG/ML
INJECTION, SOLUTION INFILTRATION; PERINEURAL
Status: DISCONTINUED | OUTPATIENT
Start: 2025-06-17 | End: 2025-06-17 | Stop reason: HOSPADM

## 2025-06-17 RX ORDER — HYDROMORPHONE HYDROCHLORIDE 2 MG/ML
0.2 INJECTION, SOLUTION INTRAMUSCULAR; INTRAVENOUS; SUBCUTANEOUS EVERY 5 MIN PRN
Status: DISCONTINUED | OUTPATIENT
Start: 2025-06-17 | End: 2025-06-17 | Stop reason: HOSPADM

## 2025-06-17 RX ORDER — AMOXICILLIN 250 MG
1 CAPSULE ORAL 2 TIMES DAILY
COMMUNITY
Start: 2025-06-17

## 2025-06-17 RX ORDER — ONDANSETRON HYDROCHLORIDE 2 MG/ML
4 INJECTION, SOLUTION INTRAVENOUS DAILY PRN
Status: COMPLETED | OUTPATIENT
Start: 2025-06-17 | End: 2025-06-17

## 2025-06-17 RX ORDER — MIDAZOLAM HYDROCHLORIDE 1 MG/ML
INJECTION INTRAMUSCULAR; INTRAVENOUS
Status: DISCONTINUED | OUTPATIENT
Start: 2025-06-17 | End: 2025-06-17

## 2025-06-17 RX ORDER — CEFAZOLIN 2 G/1
2 INJECTION, POWDER, FOR SOLUTION INTRAMUSCULAR; INTRAVENOUS
Status: COMPLETED | OUTPATIENT
Start: 2025-06-17 | End: 2025-06-17

## 2025-06-17 RX ADMIN — PROPOFOL 150 MCG/KG/MIN: 10 INJECTION, EMULSION INTRAVENOUS at 09:06

## 2025-06-17 RX ADMIN — PROPOFOL 60 MG: 10 INJECTION, EMULSION INTRAVENOUS at 09:06

## 2025-06-17 RX ADMIN — ONDANSETRON 4 MG: 2 INJECTION, SOLUTION INTRAMUSCULAR; INTRAVENOUS at 10:06

## 2025-06-17 RX ADMIN — FENTANYL CITRATE 25 MCG: 50 INJECTION INTRAMUSCULAR; INTRAVENOUS at 09:06

## 2025-06-17 RX ADMIN — SODIUM CHLORIDE, SODIUM LACTATE, POTASSIUM CHLORIDE, AND CALCIUM CHLORIDE: .6; .31; .03; .02 INJECTION, SOLUTION INTRAVENOUS at 09:06

## 2025-06-17 RX ADMIN — LIDOCAINE HYDROCHLORIDE 80 MG: 20 INJECTION, SOLUTION INTRAVENOUS at 09:06

## 2025-06-17 RX ADMIN — FENTANYL CITRATE 25 MCG: 50 INJECTION INTRAMUSCULAR; INTRAVENOUS at 10:06

## 2025-06-17 RX ADMIN — CEFAZOLIN 2 G: 2 INJECTION, POWDER, FOR SOLUTION INTRAMUSCULAR; INTRAVENOUS at 09:06

## 2025-06-17 RX ADMIN — ACETAMINOPHEN 1000 MG: 10 INJECTION, SOLUTION INTRAVENOUS at 10:06

## 2025-06-17 RX ADMIN — DEXAMETHASONE SODIUM PHOSPHATE 8 MG: 4 INJECTION, SOLUTION INTRA-ARTICULAR; INTRALESIONAL; INTRAMUSCULAR; INTRAVENOUS; SOFT TISSUE at 10:06

## 2025-06-17 RX ADMIN — MIDAZOLAM HYDROCHLORIDE 2 MG: 1 INJECTION, SOLUTION INTRAMUSCULAR; INTRAVENOUS at 09:06

## 2025-06-17 NOTE — ANESTHESIA PREPROCEDURE EVALUATION
[facility]  Anesthesia Pre-Operative Evaluation         Patient Name: Isaias Collado  YOB: 1970  MRN: [id number]    SUBJECTIVE:     Pre-operative evaluation for Procedure(s) (LRB):  EXCISION, MASS, BUTTOCK (Right)     2025    Isaias Collado is a 55 y.o. female w/ a significant PMHx of HLD, ALENA, venous insufficiency and Right buttock lipoma.    Patient now presents for the above procedure(s).    TTE:   No echocardiogram results found for the past 12 months     Problem List[1]    Review of patient's allergies indicates:   Allergen Reactions    Lisinopril Other (See Comments)     Cough.    Erythromycin Other (See Comments)     Nausea and cold sweats    Morphine Itching    Adhesive Rash     Paper tape ok       Current Inpatient Medications:      Medications Ordered Prior to Encounter[2]    Past Surgical History:   Procedure Laterality Date     SECTION      ;     COLONOSCOPY N/A 3/25/2024    Procedure: COLONOSCOPY;  Surgeon: Rasheed Garcia MD;  Location: [facility] ENDOSCOPY;  Service: Endoscopy;  Laterality: N/A;  Ref by Dr LEROY Ryan,  Lupe- Ozempic-hold x 7 days prior procedure, PEG, portal - PC  3/18- lvm and portal msg for pc- pt returned call, last dose of Ozempic was 2 months ago, pc complete. DBM    EXAMINATION UNDER ANESTHESIA N/A 2024    Procedure: EXAM UNDER ANESTHESIA EXCISION ANAL LESION;  Surgeon: Wild Nicholson MD;  [facility];  Service: Colon and Rectal;  Laterality: N/A;    EXCISION OF RECTAL MASS  2024    Procedure: EXCISION, MASS, RECTUM;  Surgeon: Wild Nicholson MD;  [facility];  Service: Colon and Rectal;;    EXTRACORPOREAL SHOCK WAVE LITHOTRIPSY Left 2024    Procedure: LITHOTRIPSY, ESWL 60 minutes;  Surgeon: Trino Young MD;  Location: [facility] OR;  Service: Urology;  Laterality: Left;  LMA   [phone] to confirm booking when scheduled. Confirmation # awaiting  confirmation AM 5/24/24  Confirmed on 5/30/24, confirmation #[id number] ROBERTO    HYSTERECTOMY      SKIN BIOPSY      lymphomatoid papulosis    TUBAL LIGATION Bilateral     VARICOSE VEIN SURGERY Right     laser and injection       OBJECTIVE:     Vital Signs Range (Last 24H):  Temp:  [36.4 °C (97.5 °F)]   Pulse:  [77]   Resp:  [18]   BP: (109)/(69)   SpO2:  [96 %]       Significant Labs:  Lab Results   Component Value Date    WBC 6.78 05/20/2025    HGB 12.2 05/20/2025    HCT 39.6 05/20/2025     05/20/2025    CHOL 312 (H) 05/20/2025    TRIG 184 (H) 05/20/2025    HDL 61 05/20/2025    ALT 17 05/20/2025    AST 16 05/20/2025     05/20/2025    K 4.0 05/20/2025     05/20/2025    CREATININE 0.8 05/20/2025    BUN 14 05/20/2025    CO2 26 05/20/2025    TSH 1.161 05/20/2025    HGBA1C 5.8 (H) 05/20/2025       Diagnostic Studies: No relevant studies.    EKG:   Results for orders placed or performed during the hospital encounter of 11/04/24   EKG 12-lead    Collection Time: 11/04/24  6:36 AM   Result Value Ref Range    QRS Duration 82 ms    [facility] QTC Calculation 464 ms    Narrative    Test Reason : R10.9,    Vent. Rate : 087 BPM     Atrial Rate : 087 BPM     P-R Int : 128 ms          QRS Dur : 082 ms      QT Int : 386 ms       P-R-T Axes : 054 034 045 degrees     QTc Int : 464 ms    Normal sinus rhythm  Low voltage QRS  Nonspecific T wave abnormality  Abnormal ECG  When compared with ECG of 14-FEB-2024 15:58,  Nonspecific T wave abnormality now evident in Anterior-lateral leads  Confirmed by Mateo CARDENAS MD ([id number]) on 11/4/2024 9:39:29 AM    Referred By: GENARO   SELF           Confirmed By:Mateo CARDENAS MD       ASSESSMENT/PLAN:       Pre-op Assessment    I have reviewed the Patient Summary Reports.     I have reviewed the Nursing Notes. I have reviewed the NPO Status.   I have reviewed the Medications.     Review of Systems  Anesthesia Hx:  No problems with previous Anesthesia               Denies Personal Hx of  Anesthesia complications.                    Social:  Non-Smoker       Hematology/Oncology:       -- Denies Anemia:                  Denies Current/Recent Cancer                Cardiovascular:  Exercise tolerance: good   Hypertension   Denies MI.  Denies CAD.     Denies Dysrhythmias.    Denies CHF.    no hyperlipidemia   ECG has been reviewed.    Functional Capacity good / => 4 METS                   Hypertension         Pulmonary:    Denies COPD.  Denies Asthma.   Denies Shortness of breath.  Sleep Apnea     Obstructive Sleep Apnea (ALENA).           Renal/:  Chronic Renal Disease renal calculi       Kidney Function/Disease             Hepatic/GI:      Denies GERD.                Musculoskeletal:  Musculoskeletal Normal                Neurological:  Denies TIA.  Denies CVA.    Denies Seizures.                                Endocrine:  Endocrine Normal            Psych:  Psychiatric History                  Physical Exam  General: Well nourished, Cooperative, Alert and Oriented    Airway:  Mallampati: II   Mouth Opening: Normal  TM Distance: Normal  Tongue: Normal  Neck ROM: Normal ROM    Dental:  Intact        Anesthesia Plan  Type of Anesthesia, risks & benefits discussed:    Anesthesia Type: Gen ETT, Gen Natural Airway  Intra-op Monitoring Plan: Standard ASA Monitors  Post Op Pain Control Plan: multimodal analgesia and IV/PO Opioids PRN  Induction:  IV  Airway Plan: Video, Post-Induction  Informed Consent: Informed consent signed with the Patient and all parties understand the risks and agree with anesthesia plan.  All questions answered.   ASA Score: 2  Day of Surgery Review of History & Physical: H&P Update referred to the surgeon/provider.    Ready For Surgery From Anesthesia Perspective.     .           [1]   Patient Active Problem List  Diagnosis    PCOS (polycystic ovarian syndrome)    Prediabetes    Varicose veins of both lower extremities with pain    Venous insufficiency of both lower extremities     Generalized anxiety disorder with panic attacks    Moderate episode of recurrent major depressive disorder    Kidney stone    Adrenal adenoma, right    OAB (overactive bladder)    Social anxiety disorder    Statin intolerance    Hyperlipidemia    Sleep apnea   [2]   No current facility-administered medications on file prior to encounter.     Current Outpatient Medications on File Prior to Encounter   Medication Sig Dispense Refill    ALPRAZolam (XANAX) 0.25 MG tablet Take 1 tablet (0.25 mg total) by mouth 2 (two) times daily as needed for Anxiety. 60 tablet 0    conjugated estrogens (PREMARIN) vaginal cream Place 0.5 g vaginally twice a week. 1 applicator 3    estradioL (ESTRACE) 1 MG tablet Take 1 mg by mouth.      hydroCHLOROthiazide (HYDRODIURIL) 25 MG tablet Take 1 tablet (25 mg total) by mouth once daily. 90 tablet 3    venlafaxine (EFFEXOR-XR) 75 MG 24 hr capsule Take 1 capsule (75 mg total) by mouth once daily. 90 capsule 1    acetaminophen (TYLENOL) 500 MG tablet Take 1 tablet (500 mg total) by mouth every 6 (six) hours as needed for Pain. 20 tablet 0    clobetasoL (TEMOVATE) 0.05 % cream Apply 1 application topically 2 (two) times daily.      clobetasol 0.05% (TEMOVATE) 0.05 % Oint AAA on the trunk and extremities BID x4-6 weeks then D/C. Restart PRN for flares 60 g 2    dexAMETHasone (DECADRON) 1 MG Tab Take 11 PM the night prior to 8 AM labs 1 tablet 0    FINACEA 15 % Foam APPLY DAILY TO SKIN TO AFFECTED AREA EVERY DAY      tirzepatide, weight loss, (ZEPBOUND) 5 mg/0.5 mL PnIj Inject 5 mg into the skin every 7 days. (Patient not taking: Reported on 6/10/2025) 6 mL 1

## 2025-06-17 NOTE — TRANSFER OF CARE
"Anesthesia Transfer of Care Note    Patient: Isaias Collado    Procedure(s) Performed: Procedure(s) (LRB):  EXCISION, MASS, BUTTOCK (Right)    Patient location: OPS    Anesthesia Type: MAC    Transport from OR: Transported from OR on 6-10 L/min O2 by face mask with adequate spontaneous ventilation    Post pain: adequate analgesia    Post assessment: no apparent anesthetic complications    Post vital signs: stable    Level of consciousness: awake, alert and oriented    Nausea/Vomiting: no nausea/vomiting    Complications: none    Transfer of care protocol was followed      Last vitals: Visit Vitals  /69 (BP Location: Right arm, Patient Position: Lying)   Pulse 77   Temp 36.4 °C (97.5 °F) (Skin)   Resp 18   Ht 5' 6" (1.676 m)   Wt 91.2 kg (200 lb 15.2 oz)   LMP 08/26/2016   SpO2 96%   Breastfeeding No   BMI 32.43 kg/m²     "

## 2025-06-17 NOTE — DISCHARGE INSTRUCTIONS
ANESTHESIA  -For the first 24 hours after surgery:  Do not drive, use heavy equipment, make important decisions, or drink alcohol  -It is normal to feel sleepy for several hours.  Rest until you are more awake.  -Have someone stay with you, if needed.  They can watch for problems and help keep you safe.  -Some possible post anesthesia side effects include: nausea and vomiting, sore throat and hoarseness, sleepiness, and dizziness.    PAIN  -If you have pain after surgery, pain medicine will help you feel better.  Take it as directed, before pain becomes severe.  Most pain relievers taken by mouth need at least 20-30 minutes to start working.  -Do not drive or drink alcohol while taking pain medicine.  -Pain medication can upset your stomach.  Taking them with a little food may help.  -Other ways to help control pain: elevation, ice, and relaxation  -Call your surgeon if still having unmanageable pain an hour after taking pain medicine.  -Pain medicine can cause constipation.  Taking an over-the counter stool softener while on prescription pain medicine and drinking plenty of fluids can prevent this side effect.  -Call your surgeon if you have severe side effects like: breathing problems, trouble waking up, dizziness, confusion, or severe constipation.    NAUSEA  -Some people have nausea after surgery.  This is often because of anesthesia, pain, pain medicine, or the stress of surgery.  -Do not push yourself to eat.  Start off with clear liquids and soup.  Slowly move to solid foods.  Don't eat fatty, rich, spicy foods at first.  Eat smaller amounts.  -If you develop persistent nausea and vomiting please notify your surgeon immediately.    BLEEDING  -Different types of surgery require different types of care and dressing changes.  It is important to follow all instructions and advice from your surgeon.  Change dressing as directed.  Call your surgeon for any concerns regarding postop bleeding.    SIGNS OF  INFECTION  -Signs of infection include: fever, swelling, drainage, and redness  -Notify your surgeon if you have a fever of 100.4 F (38.0 C) or higher.  -Notify your surgeon if you notice redness, swelling, increased pain, pus, or a foul smell at the incision site.    Notify Dr. Loomis for any questions or concerns

## 2025-06-17 NOTE — OP NOTE
DATE OF PROCEDURE: 06/17/2025    PREOPERATIVE DIAGNOSIS: Right buttock mass x2    POSTOPERATIVE DIAGNOSIS: same    PROCEDURE: Local excision of right buttock mass x2    SURGEON: Gilbert Loomis M.D.    ANESTHESIA: MAC local.    PREP: Chlorhexidine.    ESTIMATED BLOOD LOSS: Minimal.    SPECIMENS:   Right buttock mass fatty 3cm by 3cm  Right buttock mass fatty 2cm by 2cm    INDICATIONS: The patient presents to the clinic with two enlarging symptomatic   soft tissue mass on the right buttock. He was counseled on her medical and   surgical options for treatment and desired excision. The risks of the procedure  were described to the patient including bleeding, infection, pain, scarring,   wound complications, injury to local structures, recurrence, and potential needs  for further procedure. The patient demonstrated understanding of these risks   and a consent form was obtained.    PROCEDURE IN DETAIL: The patient was identified in Preoperative Unit and taken   back to the Operating Room, laid supine on the operating room table. IV   antibiotics were administered prior to the administration of anesthesia.   Anesthesia was administered without complication. The patient was then   positioned with appropriate padding in place. The surgical site was   prepped and draped in the standard sterile fashion. A timeout procedure was   performed in accordance with hospital protocol. The skin overlying the mass was  infiltrated with local anesthesia. An incision was made with a #15 blade   scalpel. The tissues were dissected until the mass was identified.   It was circumferentially dissected and once freed, it measured approximately 3cm by 3cm.  The second mass was palpated and incised. Cautery was used to dissect down to the mass. It was bluntly dissected. It measured about 2cm by 2cm and was fatty.   It was sent to Pathology for further review. The wound bed was then   irrigated and hemostasis was achieved. The wound was closed in  2 layers. The   dermis was reapproximated using 4-0 Vicryl suture in an interrupted fashion.   The skin was reapproximated using 4-0 monocryl suture in a running fashion.   Dry sterile dressing was applied. The patient was awakened from anesthesia   without complication and returned to the postop Recovery Unit in stable   condition. At the end of the case, sponge, instrument and needle counts were   correct x2 occasions. I was present and scrubbed throughout the entirety of the  case.    COMPLICATIONS: None.    CONDITION: Stable.

## 2025-06-17 NOTE — ANESTHESIA POSTPROCEDURE EVALUATION
Anesthesia Post Evaluation    Patient: Isaias Sethix    Procedure(s) Performed: Procedure(s) (LRB):  EXCISION, MASS, BUTTOCK (Right)    Final Anesthesia Type: general      Patient location during evaluation: PACU  Patient participation: Yes- Able to Participate  Level of consciousness: awake and alert, oriented and awake  Post-procedure vital signs: reviewed and stable  Pain management: adequate  Airway patency: patent  ALENA mitigation strategies: Multimodal analgesia  PONV status at discharge: No PONV  Anesthetic complications: no      Cardiovascular status: hemodynamically stable  Respiratory status: spontaneous ventilation and room air  Hydration status: euvolemic  Follow-up not needed.              Vitals Value Taken Time   /67 06/17/25 11:50   Temp 36.8 °C (98.2 °F) 06/17/25 11:50   Pulse 78 06/17/25 11:50   Resp 16 06/17/25 11:50   SpO2 98 % 06/17/25 11:50         No case tracking events are documented in the log.      Pain/Jonathon Score: Jonathon Score: 10 (6/17/2025 11:50 AM)

## 2025-06-18 ENCOUNTER — E-VISIT (OUTPATIENT)
Dept: INTERNAL MEDICINE | Facility: CLINIC | Age: 55
End: 2025-06-18
Payer: COMMERCIAL

## 2025-06-18 ENCOUNTER — TELEPHONE (OUTPATIENT)
Dept: SLEEP MEDICINE | Facility: CLINIC | Age: 55
End: 2025-06-18
Payer: COMMERCIAL

## 2025-06-18 VITALS
RESPIRATION RATE: 16 BRPM | WEIGHT: 200.94 LBS | DIASTOLIC BLOOD PRESSURE: 67 MMHG | BODY MASS INDEX: 32.29 KG/M2 | TEMPERATURE: 98 F | HEIGHT: 66 IN | SYSTOLIC BLOOD PRESSURE: 115 MMHG | OXYGEN SATURATION: 98 % | HEART RATE: 78 BPM

## 2025-06-18 DIAGNOSIS — R06.83 SNORING: ICD-10-CM

## 2025-06-18 DIAGNOSIS — G47.33 OBSTRUCTIVE SLEEP APNEA SYNDROME: Primary | ICD-10-CM

## 2025-06-18 DIAGNOSIS — G47.33 OSA (OBSTRUCTIVE SLEEP APNEA): Primary | ICD-10-CM

## 2025-06-18 DIAGNOSIS — I10 PRIMARY HYPERTENSION: ICD-10-CM

## 2025-06-18 DIAGNOSIS — E66.09 CLASS 1 OBESITY DUE TO EXCESS CALORIES WITH SERIOUS COMORBIDITY AND BODY MASS INDEX (BMI) OF 32.0 TO 32.9 IN ADULT: ICD-10-CM

## 2025-06-18 DIAGNOSIS — E78.5 HYPERLIPIDEMIA, UNSPECIFIED HYPERLIPIDEMIA TYPE: ICD-10-CM

## 2025-06-18 DIAGNOSIS — E66.811 CLASS 1 OBESITY DUE TO EXCESS CALORIES WITH SERIOUS COMORBIDITY AND BODY MASS INDEX (BMI) OF 32.0 TO 32.9 IN ADULT: ICD-10-CM

## 2025-06-18 RX ORDER — TIRZEPATIDE 5 MG/.5ML
5 INJECTION, SOLUTION SUBCUTANEOUS
Qty: 6 ML | Refills: 1 | Status: SHIPPED | OUTPATIENT
Start: 2025-06-18 | End: 2025-12-15

## 2025-06-18 NOTE — PROCEDURES
Patient Name Isaias Collado Study Ordered By Luis Fernando Vazquez  Date of Night 1 06/08/2025 10:16:44 PM Date of Birth 1970  Identification Number [id number]  Overall AHI (4%)* % time < 90% Sp02 Mean Sp02 % time snoring > 30dB  33 2% 95.7% 24.2%  PHYSICIAN INTERPRETATION AND COMMENTS: Findings are consistent with severe , obstructive sleep apnea (ALENA)  (G47.33), by overall AHI (apnea hypopnea index). This study was technically adequate to allow for interpretation.  CLINICAL HISTORY: 55 year old female presented with: 16 inch neck, BMI of 32.3, an Hadley sleepiness score of 10, history  of hypertension and symptoms of nocturnal snoring and waking up choking. Based on the clinical history, the patient has  a high pre-test probability of having Severe ALENA.  SLEEP STUDY FINDINGS: Patient underwent a 1 night Home Sleep Test and by behavioral criteria, slept for approximately  6.65 hours , with a sleep efficiency of 95%. Severe sleep disordered breathing (AHI=33) is noted based on a 4% hypopnea  desaturation criteria, predominantly in the supine position (51 events/hour) and with indications of respiratory control  instability. The patient slept supine 47.7% of the night based on valid recording time of 6.61 hours and is 5.7 times as likely  to have apneas/hypopneas when supine. The apneas/hypopneas are accompanied by minimal oxygen desaturation  (percent time below 90% SpO2: 2%, Min SpO2: 75.9%). The average desaturation across all sleep disordered breathing  events is 3.6%. Snoring occurs for 24.2% (30 dB) of the study, 11.1% is very loud. The mean pulse rate is 80.8 BPM, with very  frequent pulse rate variability (61 events with >= 6 BPM increase/decrease per hour). RDI (Respiratory Disturbance Index)  was 47/hr.  TREATMENT CONSIDERATIONS: Consider nasal continuous positive airway pressure (CPAP) as the initial treatment choice  for severe obstructive sleep apnea. Consider an attended in-lab CPAP titration study,  given the possibility of co-morbid  sleep related hypoventilation. If CPAP trial is unsuccessful, refer to Sleep Clinic for further evaluation and management.  DISEASE MANAGEMENT CONSIDERATIONS: Definitive treatment for ALENA is recommended. Consider Sleep Clinic referral for  ALENA management.  Signature: Date: 06- 21:05:54 EST  Study Review: The raw data of this SAM study has been reviewed, with the report confirmed and electronically signed by Anna Bal, Read  and interpreted by Anna Bal MD, Diplomate, Sleep Medicine, ABPN.. Caution: The diagnosis of the Obstructive Sleep Apnea Syndrome must  be based on all available clinical data, of which this study is only a part. Thus final diagnosis and treatment recommendations should include  information from an examination of the patient by a knowledgeable healthcare provider.  *Average number of apneas and hypopneas (4%) per hour of valid recording time (Note: CMS RDI; AASM AMBER)  SAM Traceability: (SN: [id number] ) PatientStudyReportOutputGUID: [id number] ; v68

## 2025-06-18 NOTE — PROGRESS NOTES
Patient ID: Isaias Collado is a 55 y.o. female.        E-Visit Time Tracking:             Chief Complaint: General Illness (Entered automatically based on patient selection in [employer] ([facility]).)      The patient initiated a request through [employer] [facility]) on 6/18/2025 for evaluation and management with a chief complaint of General Illness (Entered automatically based on patient selection in [employer] ([facility]).)     I evaluated the questionnaire submission on 06/18/2025.    [facility] P[facility] Evisit [facility]group-Chronic Conditions    6/18/2025 10:40 AM CDT - Filed by Patient   What do you need help with? Weight Management   Do you agree to participate in an E-Visit? Yes   If you have any of the following symptoms, please present to your local emergency room or call 911: I acknowledge   Medication requests for narcotics will not be addressed via an E-Visit.  Please schedule an appointment. I acknowledge   What best describes your appetite? Big   Do you have specific food cravings? Yes   Describe your specific food cravings: salty, then sweet, then crunchy foods   When you eat, how quickly do you feel full A long time   Give an example of what you have eaten in a day in the past 2 weeks:    Breakfast: bagel with coffee   Lunch: left overs   Dinner: take out   Snacks: chips and ice cream   Drinks: dr. luz   Have you exercised in the past week? No   Do you have a personal or family history of thyroid cancer? No   Do you have a personal history of kidney stones? Yes   Do you have a personal history of seizures? No   Do you have a personal history of pancreatitis? No   I would like to address: Medication for weight loss   Do you want to address a new or existing medication? Address a current medication   Would you like to change or continue your medication? Continue medication   What is the name of the medication you would like to continue? zepbound   Are you taking your medication as prescribed? Yes    What medical condition is the  medication intended to treat?  sleep apnea and weight   Has the medication helped your condition? Not sure   Have you experienced any side effects from the medication? No   Provide any additional information you feel is important. My sleep study shows I have sleep apnea Dr. Ryan   Please attach any relevant images or files    Are you able to take your vital signs? No         Encounter Diagnoses   Name Primary?    Obstructive sleep apnea syndrome Yes    Hyperlipidemia, unspecified hyperlipidemia type     Snoring     Primary hypertension     Class 1 obesity due to excess calories with serious comorbidity and body mass index (BMI) of 32.0 to 32.9 in adult         No orders of the defined types were placed in this encounter.     Medications Ordered This Encounter   Medications    tirzepatide, weight loss, (ZEPBOUND) 5 mg/0.5 mL PnIj     Sig: Inject 5 mg into the skin every 7 days.     Dispense:  6 mL     Refill:  1        No follow-ups on file.

## 2025-06-19 LAB
ESTROGEN SERPL-MCNC: NORMAL PG/ML
INSULIN SERPL-ACNC: NORMAL U[IU]/ML
LAB AP CLINICAL INFORMATION: NORMAL
LAB AP GROSS DESCRIPTION: NORMAL
LAB AP PERFORMING LOCATION(S): NORMAL
LAB AP REPORT FOOTNOTES: NORMAL

## 2025-06-19 NOTE — TELEPHONE ENCOUNTER
Yfn Alford,    I have ordered CPAP for Isaias Sethix - can you please fax CPAP prescription +docs to [facility]?  This is Gurmeet's patient  Please schedule CPAP follow up with Gurmeet in 3 months    Thank you!

## 2025-07-01 ENCOUNTER — OFFICE VISIT (OUTPATIENT)
Dept: SURGERY | Facility: CLINIC | Age: 55
End: 2025-07-01
Payer: COMMERCIAL

## 2025-07-01 VITALS
WEIGHT: 203.63 LBS | HEART RATE: 112 BPM | TEMPERATURE: 99 F | DIASTOLIC BLOOD PRESSURE: 87 MMHG | BODY MASS INDEX: 32.72 KG/M2 | HEIGHT: 66 IN | SYSTOLIC BLOOD PRESSURE: 138 MMHG

## 2025-07-01 DIAGNOSIS — K12.1 MOUTH ULCER: Primary | ICD-10-CM

## 2025-07-01 PROCEDURE — 3075F SYST BP GE 130 - 139MM HG: CPT | Mod: CPTII,S$GLB,, | Performed by: STUDENT IN AN ORGANIZED HEALTH CARE EDUCATION/TRAINING PROGRAM

## 2025-07-01 PROCEDURE — 1160F RVW MEDS BY RX/DR IN RCRD: CPT | Mod: CPTII,S$GLB,, | Performed by: STUDENT IN AN ORGANIZED HEALTH CARE EDUCATION/TRAINING PROGRAM

## 2025-07-01 PROCEDURE — 1159F MED LIST DOCD IN RCRD: CPT | Mod: CPTII,S$GLB,, | Performed by: STUDENT IN AN ORGANIZED HEALTH CARE EDUCATION/TRAINING PROGRAM

## 2025-07-01 PROCEDURE — 3044F HG A1C LEVEL LT 7.0%: CPT | Mod: CPTII,S$GLB,, | Performed by: STUDENT IN AN ORGANIZED HEALTH CARE EDUCATION/TRAINING PROGRAM

## 2025-07-01 PROCEDURE — 3079F DIAST BP 80-89 MM HG: CPT | Mod: CPTII,S$GLB,, | Performed by: STUDENT IN AN ORGANIZED HEALTH CARE EDUCATION/TRAINING PROGRAM

## 2025-07-01 PROCEDURE — 99024 POSTOP FOLLOW-UP VISIT: CPT | Mod: S$GLB,,, | Performed by: STUDENT IN AN ORGANIZED HEALTH CARE EDUCATION/TRAINING PROGRAM

## 2025-07-01 PROCEDURE — 99999 PR PBB SHADOW E&M-EST. PATIENT-LVL IV: CPT | Mod: PBBFAC,,, | Performed by: STUDENT IN AN ORGANIZED HEALTH CARE EDUCATION/TRAINING PROGRAM

## 2025-07-07 NOTE — PROGRESS NOTES
S/p excision of right buttock mass x2  Path lipoma  Incisions healing well, no issues  Complains of right posterior mouth ulcer, related to ETT?  Requests ENT referral  RTC prn

## 2025-07-08 ENCOUNTER — PATIENT MESSAGE (OUTPATIENT)
Dept: SLEEP MEDICINE | Facility: CLINIC | Age: 55
End: 2025-07-08
Payer: COMMERCIAL

## 2025-07-08 ENCOUNTER — PATIENT MESSAGE (OUTPATIENT)
Dept: INTERNAL MEDICINE | Facility: CLINIC | Age: 55
End: 2025-07-08
Payer: COMMERCIAL

## 2025-07-09 ENCOUNTER — TELEPHONE (OUTPATIENT)
Dept: INTERNAL MEDICINE | Facility: CLINIC | Age: 55
End: 2025-07-09
Payer: COMMERCIAL

## 2025-07-09 NOTE — TELEPHONE ENCOUNTER
Prior authorization has been initiated through [email or web address]. Authorization is currently pending approval from your insurance. Approval may take 24-72 hours for a response.

## 2025-07-09 NOTE — TELEPHONE ENCOUNTER
The P.A was closed due to it being excluded from the medications the insurance covers. The did say We[employer] was something theyPrior authorization has been initiated through [employer]. Authorization is currently pending approval from your insurance. Approval may take 24-72 hours for a response.  covered in the past.

## 2025-07-09 NOTE — TELEPHONE ENCOUNTER
I spoke with the patient and let her know the zepbound was not covered by her insurance and we will be getting back with to see what are our next steps moving forward.

## 2025-07-16 ENCOUNTER — OFFICE VISIT (OUTPATIENT)
Dept: PSYCHIATRY | Facility: CLINIC | Age: 55
End: 2025-07-16
Payer: COMMERCIAL

## 2025-07-16 DIAGNOSIS — F40.10 SOCIAL ANXIETY DISORDER: ICD-10-CM

## 2025-07-16 DIAGNOSIS — F41.1 GENERALIZED ANXIETY DISORDER WITH PANIC ATTACKS: Primary | ICD-10-CM

## 2025-07-16 DIAGNOSIS — Z13.39 ADHD (ATTENTION DEFICIT HYPERACTIVITY DISORDER) EVALUATION: ICD-10-CM

## 2025-07-16 DIAGNOSIS — F41.0 GENERALIZED ANXIETY DISORDER WITH PANIC ATTACKS: Primary | ICD-10-CM

## 2025-07-16 PROCEDURE — 90837 PSYTX W PT 60 MINUTES: CPT | Mod: S$GLB,,, | Performed by: SOCIAL WORKER

## 2025-07-16 PROCEDURE — 1159F MED LIST DOCD IN RCRD: CPT | Mod: CPTII,S$GLB,, | Performed by: SOCIAL WORKER

## 2025-07-16 PROCEDURE — 3044F HG A1C LEVEL LT 7.0%: CPT | Mod: CPTII,S$GLB,, | Performed by: SOCIAL WORKER

## 2025-07-16 PROCEDURE — 99999 PR PBB SHADOW E&M-EST. PATIENT-LVL I: CPT | Mod: PBBFAC,,, | Performed by: SOCIAL WORKER

## 2025-07-16 NOTE — PROGRESS NOTES
Individual Psychotherapy (LCSW/PhD)  Isaias Collado,  2025    Site:  [facility]         Therapeutic Intervention: Met with patient for individual psychotherapy.    Chief complaint/reason for encounter: depression and anxiety     Interval history and content of current session: Pt reported Danis is cutting the grass. Pt reported Danis's paternal grandmother . Pt reported she got her lipoma removed. PT reported they messed her some thing in her mouth orally. Pt reported her tooth ended up coming out, which solved the problem. Pt reported low motivation. Pt reported her mom is doing really well. They go to [place] [employer]. She has completely blocked the neighbor. Pt reported they told her she has sleep apnea, after her sleep study. She completed the ADHD testing, and does not have ADHD.   We ended with guided deep breathing to increase relaxation. PT reported she is struggling eating. We discussed healthy eating patterns. We discussed cutting out the soda, in order to ended the cycle of wanting something salty. We discussed healthy salty snacks. PT reported since being off the medication (ZEPBOUND), the cravings have come back. She denies SI, no HI or AVH.     Treatment plan:  Target symptoms: depression, anxiety   Why chosen therapy is appropriate versus another modality: relevant to diagnosis, patient responds to this modality, evidence based practice  Outcome monitoring methods: self-report, observation, checklist/rating scale  Therapeutic intervention type: insight oriented psychotherapy, behavior modifying psychotherapy, supportive psychotherapy    Risk parameters:  Patient reports no suicidal ideation  Patient reports no homicidal ideation  Patient reports no self-injurious behavior  Patient reports no violent behavior    Verbal deficits: None    Patient's response to intervention:  The patient's response to intervention is accepting, motivated.    Progress toward goals and other mental status  changes:  The patient's progress toward goals is fair .    Diagnosis:     ICD-10-CM ICD-9-CM   1. Generalized anxiety disorder with panic attacks  F41.1 300.02    F41.0 300.01   2. ADHD (attention deficit hyperactivity disorder) evaluation  Z13.39 V79.8             Plan: Pt plans to continue individual psychotherapy    Return to clinic: as scheduled    Length of Service (minutes): 60

## 2025-07-21 ENCOUNTER — PATIENT MESSAGE (OUTPATIENT)
Dept: ADMINISTRATIVE | Facility: HOSPITAL | Age: 55
End: 2025-07-21
Payer: COMMERCIAL

## 2025-08-01 PROBLEM — G72.0 STATIN MYOPATHY: Status: ACTIVE | Noted: 2025-05-20

## 2025-08-01 PROBLEM — T46.6X5A STATIN MYOPATHY: Status: ACTIVE | Noted: 2025-05-20

## 2025-08-01 PROBLEM — E66.811 CLASS 1 OBESITY WITH SERIOUS COMORBIDITY AND BODY MASS INDEX (BMI) OF 33.0 TO 33.9 IN ADULT: Status: ACTIVE | Noted: 2025-08-01

## 2025-08-04 ENCOUNTER — HOSPITAL ENCOUNTER (OUTPATIENT)
Dept: CARDIOLOGY | Facility: HOSPITAL | Age: 55
Discharge: HOME | End: 2025-08-04
Attending: FAMILY MEDICINE
Payer: COMMERCIAL

## 2025-08-04 VITALS
WEIGHT: 207 LBS | HEIGHT: 66 IN | DIASTOLIC BLOOD PRESSURE: 89 MMHG | SYSTOLIC BLOOD PRESSURE: 139 MMHG | BODY MASS INDEX: 33.27 KG/M2 | HEART RATE: 88 BPM

## 2025-08-04 DIAGNOSIS — E78.2 MIXED HYPERLIPIDEMIA: ICD-10-CM

## 2025-08-04 DIAGNOSIS — R07.89 OTHER CHEST PAIN: ICD-10-CM

## 2025-08-04 DIAGNOSIS — G72.0 STATIN MYOPATHY: ICD-10-CM

## 2025-08-04 DIAGNOSIS — T46.6X5A STATIN MYOPATHY: ICD-10-CM

## 2025-08-04 LAB
CV STRESS BASE HR: 88 BPM
DIASTOLIC BLOOD PRESSURE: 89 MMHG
OHS CV CPX 1 MINUTE RECOVERY HEART RATE: 139 BPM
OHS CV CPX 85 PERCENT MAX PREDICTED HEART RATE MALE: 139
OHS CV CPX ESTIMATED METS: 9
OHS CV CPX MAX PREDICTED HEART RATE: 163
OHS CV CPX PATIENT HEIGHT IN: 66
OHS CV CPX PATIENT IS FEMALE: 1
OHS CV CPX PATIENT IS MALE: 0
OHS CV CPX PEAK DIASTOLIC BLOOD PRESSURE: 83 MMHG
OHS CV CPX PEAK HEAR RATE: 151 BPM
OHS CV CPX PEAK RATE PRESSURE PRODUCT: NORMAL
OHS CV CPX PEAK SYSTOLIC BLOOD PRESSURE: 178 MMHG
OHS CV CPX PERCENT MAX PREDICTED HEART RATE ACHIEVED: 97
OHS CV CPX RATE PRESSURE PRODUCT PRESENTING: NORMAL
STRESS ECHO POST EXERCISE DUR MIN: 6 MINUTES
STRESS ECHO POST EXERCISE DUR SEC: 0 SECONDS
STRESS ST DEPRESSION: 0.3 MM
SYSTOLIC BLOOD PRESSURE: 139 MMHG

## 2025-08-04 PROCEDURE — 93017 CV STRESS TEST TRACING ONLY: CPT

## 2025-08-04 PROCEDURE — 93018 CV STRESS TEST I&R ONLY: CPT | Mod: ,,, | Performed by: INTERNAL MEDICINE

## 2025-08-04 PROCEDURE — 93016 CV STRESS TEST SUPVJ ONLY: CPT | Mod: ,,, | Performed by: INTERNAL MEDICINE

## 2025-08-20 ENCOUNTER — TELEPHONE (OUTPATIENT)
Dept: CARDIOLOGY | Facility: CLINIC | Age: 55
End: 2025-08-20
Payer: COMMERCIAL

## 2025-08-20 ENCOUNTER — PATIENT MESSAGE (OUTPATIENT)
Dept: CARDIOLOGY | Facility: CLINIC | Age: 55
End: 2025-08-20
Payer: COMMERCIAL

## 2025-08-25 ENCOUNTER — OFFICE VISIT (OUTPATIENT)
Dept: CARDIOLOGY | Facility: CLINIC | Age: 55
End: 2025-08-25
Payer: COMMERCIAL

## 2025-08-25 VITALS
HEART RATE: 76 BPM | HEIGHT: 66 IN | SYSTOLIC BLOOD PRESSURE: 147 MMHG | DIASTOLIC BLOOD PRESSURE: 89 MMHG | OXYGEN SATURATION: 99 % | BODY MASS INDEX: 33.52 KG/M2 | WEIGHT: 208.56 LBS

## 2025-08-25 DIAGNOSIS — E78.2 MIXED HYPERLIPIDEMIA: ICD-10-CM

## 2025-08-25 DIAGNOSIS — R06.09 DYSPNEA ON EXERTION: Primary | ICD-10-CM

## 2025-08-25 DIAGNOSIS — E11.69 HYPERLIPIDEMIA ASSOCIATED WITH TYPE 2 DIABETES MELLITUS: ICD-10-CM

## 2025-08-25 DIAGNOSIS — Z13.6 ENCOUNTER FOR SCREENING FOR CARDIOVASCULAR DISORDERS: ICD-10-CM

## 2025-08-25 DIAGNOSIS — E78.5 HYPERLIPIDEMIA ASSOCIATED WITH TYPE 2 DIABETES MELLITUS: ICD-10-CM

## 2025-08-25 PROCEDURE — 3077F SYST BP >= 140 MM HG: CPT | Mod: CPTII,S$GLB,, | Performed by: STUDENT IN AN ORGANIZED HEALTH CARE EDUCATION/TRAINING PROGRAM

## 2025-08-25 PROCEDURE — 1159F MED LIST DOCD IN RCRD: CPT | Mod: CPTII,S$GLB,, | Performed by: STUDENT IN AN ORGANIZED HEALTH CARE EDUCATION/TRAINING PROGRAM

## 2025-08-25 PROCEDURE — 3044F HG A1C LEVEL LT 7.0%: CPT | Mod: CPTII,S$GLB,, | Performed by: STUDENT IN AN ORGANIZED HEALTH CARE EDUCATION/TRAINING PROGRAM

## 2025-08-25 PROCEDURE — 3079F DIAST BP 80-89 MM HG: CPT | Mod: CPTII,S$GLB,, | Performed by: STUDENT IN AN ORGANIZED HEALTH CARE EDUCATION/TRAINING PROGRAM

## 2025-08-25 PROCEDURE — 99204 OFFICE O/P NEW MOD 45 MIN: CPT | Mod: S$GLB,,, | Performed by: STUDENT IN AN ORGANIZED HEALTH CARE EDUCATION/TRAINING PROGRAM

## 2025-08-25 PROCEDURE — 99999 PR PBB SHADOW E&M-EST. PATIENT-LVL V: CPT | Mod: PBBFAC,,, | Performed by: STUDENT IN AN ORGANIZED HEALTH CARE EDUCATION/TRAINING PROGRAM

## 2025-08-25 PROCEDURE — 3008F BODY MASS INDEX DOCD: CPT | Mod: CPTII,S$GLB,, | Performed by: STUDENT IN AN ORGANIZED HEALTH CARE EDUCATION/TRAINING PROGRAM

## 2025-08-25 RX ORDER — ROSUVASTATIN CALCIUM 20 MG/1
20 TABLET, COATED ORAL DAILY
Qty: 90 TABLET | Refills: 3 | Status: SHIPPED | OUTPATIENT
Start: 2025-08-25 | End: 2026-08-25

## 2025-09-05 ENCOUNTER — HOSPITAL ENCOUNTER (OUTPATIENT)
Dept: CARDIOLOGY | Facility: HOSPITAL | Age: 55
Discharge: HOME | End: 2025-09-05
Attending: STUDENT IN AN ORGANIZED HEALTH CARE EDUCATION/TRAINING PROGRAM
Payer: COMMERCIAL

## 2025-09-05 ENCOUNTER — HOSPITAL ENCOUNTER (OUTPATIENT)
Dept: RADIOLOGY | Facility: HOSPITAL | Age: 55
Discharge: HOME | End: 2025-09-05
Attending: STUDENT IN AN ORGANIZED HEALTH CARE EDUCATION/TRAINING PROGRAM
Payer: COMMERCIAL

## 2025-09-05 VITALS
WEIGHT: 208 LBS | SYSTOLIC BLOOD PRESSURE: 147 MMHG | DIASTOLIC BLOOD PRESSURE: 89 MMHG | HEART RATE: 70 BPM | HEIGHT: 66 IN | BODY MASS INDEX: 33.43 KG/M2

## 2025-09-05 DIAGNOSIS — E78.5 HYPERLIPIDEMIA ASSOCIATED WITH TYPE 2 DIABETES MELLITUS: ICD-10-CM

## 2025-09-05 DIAGNOSIS — R06.09 DYSPNEA ON EXERTION: ICD-10-CM

## 2025-09-05 DIAGNOSIS — E11.69 HYPERLIPIDEMIA ASSOCIATED WITH TYPE 2 DIABETES MELLITUS: ICD-10-CM

## 2025-09-05 LAB
AORTIC SIZE INDEX (SOV): 1.7 CM/M2
AORTIC SIZE INDEX: 1.7 CM/M2
ASCENDING AORTA: 3.5 CM
AV AREA BY CONTINUOUS VTI: 2.3 CM2
AV INDEX (PROSTH): 0.69
AV LVOT MEAN GRADIENT: 2 MMHG
AV LVOT PEAK GRADIENT: 3 MMHG
AV MEAN GRADIENT: 3 MMHG
AV PEAK GRADIENT: 7 MMHG
AV VALVE AREA BY VELOCITY RATIO: 2.4 CM²
AV VALVE AREA: 2.4 CM2
AV VELOCITY RATIO: 0.69
BSA FOR ECHO PROCEDURE: 2.1 M2
CV ECHO LV RWT: 0.49 CM
DOP CALC AO PEAK VEL: 1.3 M/S
DOP CALC AO VTI: 25.5 CM
DOP CALC LVOT AREA: 3.5 CM2
DOP CALC LVOT DIAMETER: 2.1 CM
DOP CALC LVOT PEAK VEL: 0.9 M/S
DOP CALCLVOT PEAK VEL VTI: 17.6 CM
E WAVE DECELERATION TIME: 218 MS
E/A RATIO: 0.77
E/E' RATIO: 8 M/S
ECHO EF ESTIMATED: 54 %
ECHO LV POSTERIOR WALL: 1.1 CM (ref 0.6–1.1)
EJECTION FRACTION: 58 %
FRACTIONAL SHORTENING: 28.9 % (ref 28–44)
INTERVENTRICULAR SEPTUM: 1 CM (ref 0.6–1.1)
IVC DIAMETER: 2.11 CM
IVRT: 88 MS
LA MAJOR: 5.1 CM
LA MINOR: 5 CM
LA WIDTH: 3.8 CM
LEFT ATRIUM SIZE: 3.7 CM
LEFT ATRIUM VOLUME INDEX MOD: 32 ML/M2
LEFT ATRIUM VOLUME INDEX: 30 ML/M2
LEFT ATRIUM VOLUME MOD: 65 ML
LEFT ATRIUM VOLUME: 60 CM3
LEFT INTERNAL DIMENSION IN SYSTOLE: 3.2 CM (ref 2.1–4)
LEFT VENTRICLE DIASTOLIC VOLUME INDEX: 44.83 ML/M2
LEFT VENTRICLE DIASTOLIC VOLUME: 91 ML
LEFT VENTRICLE MASS INDEX: 80.8 G/M2
LEFT VENTRICLE SYSTOLIC VOLUME INDEX: 20.7 ML/M2
LEFT VENTRICLE SYSTOLIC VOLUME: 42 ML
LEFT VENTRICULAR INTERNAL DIMENSION IN DIASTOLE: 4.5 CM (ref 3.5–6)
LEFT VENTRICULAR MASS: 164 G
LV LATERAL E/E' RATIO: 7
LV SEPTAL E/E' RATIO: 9
Lab: 2 CM/M
Lab: 2.1 CM/M
MV A" WAVE DURATION": 151.28 MS
MV PEAK A VEL: 0.82 M/S
MV PEAK E VEL: 0.63 M/S
OHS CV CPX PATIENT HEIGHT IN: 66
OHS CV RV/LV RATIO: 0.64 CM
PISA TR MAX VEL: 2.1 M/S
PULM VEIN A" WAVE DURATION": 151.28 MS
PULM VEIN S/D RATIO: 1.5
PULMONIC VEIN PEAK A VELOCITY: 0.2 M/S
PV PEAK D VEL: 0.28 M/S
PV PEAK S VEL: 0.42 M/S
RA MAJOR: 3.87 CM
RA PRESSURE ESTIMATED: 3 MMHG
RA WIDTH: 3.35 CM
RIGHT ATRIAL AREA: 12.4 CM2
RIGHT VENTRICLE DIASTOLIC BASEL DIMENSION: 2.9 CM
RV TB RVSP: 5 MMHG
RV TISSUE DOPPLER FREE WALL SYSTOLIC VELOCITY 1 (APICAL 4 CHAMBER VIEW): 10.65 CM/S
SINUS: 3.4 CM
STJ: 3 CM
TDI LATERAL: 0.09 M/S
TDI SEPTAL: 0.07 M/S
TDI: 0.08 M/S
TRICUSPID ANNULAR PLANE SYSTOLIC EXCURSION: 1.6 CM
TV PEAK GRADIENT: 17 MMHG
TV REST PULMONARY ARTERY PRESSURE: 21 MMHG
Z-SCORE OF LEFT VENTRICULAR DIMENSION IN END DIASTOLE: -2.9
Z-SCORE OF LEFT VENTRICULAR DIMENSION IN END SYSTOLE: -1.12

## 2025-09-05 PROCEDURE — 75571 CT HRT W/O DYE W/CA TEST: CPT | Mod: 26,,, | Performed by: RADIOLOGY

## 2025-09-05 PROCEDURE — 93306 TTE W/DOPPLER COMPLETE: CPT

## 2025-09-05 PROCEDURE — 93306 TTE W/DOPPLER COMPLETE: CPT | Mod: 26,,, | Performed by: INTERNAL MEDICINE

## 2025-09-05 PROCEDURE — 75571 CT HRT W/O DYE W/CA TEST: CPT | Mod: TC

## (undated) DEVICE — TAPE SILK 3IN

## (undated) DEVICE — TRAY MINOR GEN SURG OMC

## (undated) DEVICE — PAD ABDOMINAL STERILE 5X9IN

## (undated) DEVICE — GOWN POLY REINF BRTH SLV XL

## (undated) DEVICE — ELECTRODE REM PLYHSV RETURN 9

## (undated) DEVICE — PACK ECLIPSE BASIC III SURG

## (undated) DEVICE — SEE MEDLINE ITEM 154981

## (undated) DEVICE — COVER OVERHEAD SURG LT BLUE

## (undated) DEVICE — GLOVE SURGICAL LATEX SZ 7

## (undated) DEVICE — TIP YANKAUERS BULB NO VENT

## (undated) DEVICE — SWAB CULTURETTE SINGLE

## (undated) DEVICE — LUBRICANT SURGILUBE 2 OZ

## (undated) DEVICE — TRAY SKIN SCRUB WET PREMIUM

## (undated) DEVICE — PACK SURGERY START

## (undated) DEVICE — PANTIES FEMININE NAPKIN LG/XLG

## (undated) DEVICE — SYR 10CC LUER LOCK

## (undated) DEVICE — SPONGE COTTON TRAY 4X4IN

## (undated) DEVICE — TOWEL OR XRAY BLUE 17X26IN

## (undated) DEVICE — PAD PREP CUFFED NS 24X48IN

## (undated) DEVICE — DRAPE LAP T SHT W/ INSTR PAD

## (undated) DEVICE — BOVIE SUCTION

## (undated) DEVICE — NDL HYPO STD REG BVL 22GX1.5IN

## (undated) DEVICE — GOWN POLY REINF BRTH SLV LG

## (undated) DEVICE — PENCIL SMK EVAC CONNECTOR 10IN

## (undated) DEVICE — SUT MONOCRYL 4-0 PS-2

## (undated) DEVICE — KIT COLLECTION E SWAB REGULAR

## (undated) DEVICE — ADHESIVE DERMABOND ADVANCED

## (undated) DEVICE — SUTURE VICRYL PLUS 3-0 PS1 27

## (undated) DEVICE — DRESSING XEROFORM NONADH 1X8IN